# Patient Record
Sex: FEMALE | Race: WHITE | NOT HISPANIC OR LATINO | Employment: OTHER | URBAN - METROPOLITAN AREA
[De-identification: names, ages, dates, MRNs, and addresses within clinical notes are randomized per-mention and may not be internally consistent; named-entity substitution may affect disease eponyms.]

---

## 2017-06-01 ENCOUNTER — ALLSCRIPTS OFFICE VISIT (OUTPATIENT)
Dept: OTHER | Facility: OTHER | Age: 42
End: 2017-06-01

## 2017-06-01 DIAGNOSIS — B34.9 VIRAL INFECTION: ICD-10-CM

## 2017-06-13 ENCOUNTER — ALLSCRIPTS OFFICE VISIT (OUTPATIENT)
Dept: OTHER | Facility: OTHER | Age: 42
End: 2017-06-13

## 2017-10-19 ENCOUNTER — GENERIC CONVERSION - ENCOUNTER (OUTPATIENT)
Dept: OTHER | Facility: OTHER | Age: 42
End: 2017-10-19

## 2018-01-12 VITALS
BODY MASS INDEX: 26.09 KG/M2 | SYSTOLIC BLOOD PRESSURE: 118 MMHG | WEIGHT: 172.13 LBS | HEIGHT: 68 IN | DIASTOLIC BLOOD PRESSURE: 74 MMHG

## 2018-01-13 VITALS
BODY MASS INDEX: 25.61 KG/M2 | RESPIRATION RATE: 18 BRPM | WEIGHT: 169 LBS | DIASTOLIC BLOOD PRESSURE: 60 MMHG | HEART RATE: 117 BPM | HEIGHT: 68 IN | TEMPERATURE: 98.8 F | SYSTOLIC BLOOD PRESSURE: 116 MMHG

## 2018-01-22 VITALS
DIASTOLIC BLOOD PRESSURE: 72 MMHG | SYSTOLIC BLOOD PRESSURE: 120 MMHG | WEIGHT: 167 LBS | HEIGHT: 68 IN | TEMPERATURE: 99.1 F | BODY MASS INDEX: 25.31 KG/M2

## 2019-07-02 ENCOUNTER — APPOINTMENT (OUTPATIENT)
Dept: RADIOLOGY | Age: 44
End: 2019-07-02
Payer: COMMERCIAL

## 2019-07-02 ENCOUNTER — OFFICE VISIT (OUTPATIENT)
Dept: URGENT CARE | Age: 44
End: 2019-07-02
Payer: COMMERCIAL

## 2019-07-02 VITALS
DIASTOLIC BLOOD PRESSURE: 66 MMHG | OXYGEN SATURATION: 96 % | TEMPERATURE: 98.9 F | HEART RATE: 90 BPM | WEIGHT: 184 LBS | RESPIRATION RATE: 18 BRPM | BODY MASS INDEX: 27.89 KG/M2 | HEIGHT: 68 IN | SYSTOLIC BLOOD PRESSURE: 132 MMHG

## 2019-07-02 DIAGNOSIS — B37.9 YEAST INFECTION: ICD-10-CM

## 2019-07-02 DIAGNOSIS — S99.911A INJURY OF RIGHT ANKLE, INITIAL ENCOUNTER: Primary | ICD-10-CM

## 2019-07-02 DIAGNOSIS — S82.891A CLOSED FRACTURE OF RIGHT ANKLE, INITIAL ENCOUNTER: ICD-10-CM

## 2019-07-02 DIAGNOSIS — S99.911A INJURY OF RIGHT ANKLE, INITIAL ENCOUNTER: ICD-10-CM

## 2019-07-02 PROCEDURE — 73630 X-RAY EXAM OF FOOT: CPT

## 2019-07-02 PROCEDURE — 99203 OFFICE O/P NEW LOW 30 MIN: CPT | Performed by: NURSE PRACTITIONER

## 2019-07-02 PROCEDURE — 73610 X-RAY EXAM OF ANKLE: CPT

## 2019-07-02 PROCEDURE — 29515 APPLICATION SHORT LEG SPLINT: CPT | Performed by: NURSE PRACTITIONER

## 2019-07-02 RX ORDER — LEVONORGESTREL AND ETHINYL ESTRADIOL 0.1-0.02MG
KIT ORAL
COMMUNITY
End: 2019-07-02

## 2019-07-02 RX ORDER — LEVONORGESTREL AND ETHINYL ESTRADIOL 0.1-0.02MG
KIT ORAL
COMMUNITY
Start: 2017-11-10 | End: 2019-07-02

## 2019-07-02 RX ORDER — LEVONORGESTREL AND ETHINYL ESTRADIOL 0.1-0.02MG
KIT ORAL EVERY 24 HOURS
COMMUNITY
Start: 2017-10-30 | End: 2019-07-02

## 2019-07-02 RX ORDER — OXYCODONE HYDROCHLORIDE AND ACETAMINOPHEN 5; 325 MG/1; MG/1
1 TABLET ORAL EVERY 8 HOURS PRN
Qty: 9 TABLET | Refills: 0 | Status: SHIPPED | OUTPATIENT
Start: 2019-07-02 | End: 2019-07-05

## 2019-07-02 RX ORDER — RANITIDINE 150 MG/1
CAPSULE ORAL
COMMUNITY
End: 2020-10-12

## 2019-07-02 RX ORDER — LORAZEPAM 1 MG/1
.5-1 TABLET ORAL 2 TIMES DAILY PRN
COMMUNITY
Start: 2017-06-13 | End: 2019-07-02

## 2019-07-02 RX ORDER — FLUTICASONE PROPIONATE 50 MCG
2 SPRAY, SUSPENSION (ML) NASAL 2 TIMES DAILY
COMMUNITY
Start: 2017-10-19 | End: 2019-07-02

## 2019-07-02 RX ORDER — HYDROCODONE BITARTRATE AND ACETAMINOPHEN 5; 325 MG/1; MG/1
TABLET ORAL
COMMUNITY
Start: 2017-04-13 | End: 2019-07-02

## 2019-07-02 RX ORDER — FAMOTIDINE 40 MG/1
TABLET, FILM COATED ORAL
COMMUNITY
End: 2020-10-12

## 2019-07-02 RX ORDER — FLUCONAZOLE 150 MG/1
TABLET ORAL
COMMUNITY
Start: 2017-11-10 | End: 2019-07-02

## 2019-07-02 RX ORDER — METRONIDAZOLE 7.5 MG/G
GEL VAGINAL EVERY 24 HOURS
COMMUNITY
Start: 2017-05-11 | End: 2019-07-02

## 2019-07-02 RX ORDER — FAMOTIDINE 10 MG
10 TABLET ORAL 2 TIMES DAILY
COMMUNITY
End: 2019-07-02

## 2019-07-02 NOTE — PATIENT INSTRUCTIONS
F/u with ortho asap  Use crutches and do not bear weight on right ankle  Elevate right ankle and ice  Keep splint dry  Vulvovaginal Candidiasis   WHAT YOU NEED TO KNOW:   Vulvovaginal candidiasis, or yeast infection, is a common vaginal infection  Vulvovaginal candidiasis is caused by a fungus, or yeast-like germ  Fungi are normally found in your vagina  When there are too many fungi, it can cause an infection  DISCHARGE INSTRUCTIONS:   Return to the emergency department if:   · You have fever and chills  · You are bleeding from your vagina and it is not your monthly period  · You develop abdominal or pelvic pain  Contact your healthcare provider if:   · Your signs and symptoms get worse, even after treatment  · You have questions or concerns about your condition or care  Medicines:   · Medicines  help treat the fungal infection and decrease inflammation  The medicine may be a pill, topical cream, or vaginal suppository  · Take your medicine as directed  Contact your healthcare provider if you think your medicine is not helping or if you have side effects  Tell him of her if you are allergic to any medicine  Keep a list of the medicines, vitamins, and herbs you take  Include the amounts, and when and why you take them  Bring the list or the pill bottles to follow-up visits  Carry your medicine list with you in case of an emergency  Manage your symptoms:   · Wear cotton underwear  · Keep the vaginal area clean and dry  · Do not have sex until your symptoms are gone  · Do not douche  · Do not use feminine hygiene sprays, powders, or bubble bath  Prevent another infection:   · Take showers instead of baths  · Eat yogurt  · Limit the amount of alcohol you drink'    · Control your blood sugar if you are diabetic  · Limit your time in hot tubs  Follow up with your healthcare provider as directed:  Write down your questions so you remember to ask them during your visits     © 2017 [facility] Information is for End User's use only and may not be sold, redistributed or otherwise used for commercial purposes  All illustrations and images included in CareNotes® are the copyrighted property of [employer]  or Ollie Argueta  The above information is an  only  It is not intended as medical advice for individual conditions or treatments  Talk to your doctor, nurse or pharmacist before following any medical regimen to see if it is safe and effective for you  Ankle Fracture   WHAT YOU NEED TO KNOW:   An ankle fracture is a break in 1 or more of the bones in your ankle  DISCHARGE INSTRUCTIONS:   Call 911 for any of the following:   · You feel lightheaded, short of breath, and have chest pain  · You cough up blood  Return to the emergency department if:   · Your leg feels warm, tender, and painful  It may look swollen and red  · Blood soaks through your bandage  · You have severe pain in your ankle  · Your cast feels too tight  · Your foot or toes are cold or numb  · Your foot or toenails turn blue or gray  Contact your healthcare provider if:   · Your splint feels too tight  · Your swelling has increased or returned  · You have a fever  · Your pain does not go away, even after treatment  · You have questions or concerns about your condition or care  Medicines: You may need any of the following:  · Acetaminophen  decreases pain and fever  It is available without a doctor's order  Ask how much to take and how often to take it  Follow directions  Acetaminophen can cause liver damage if not taken correctly  · NSAIDs , such as ibuprofen, help decrease swelling, pain, and fever  This medicine is available with or without a doctor's order  NSAIDs can cause stomach bleeding or kidney problems in certain people  If you take blood thinner medicine, always ask your healthcare provider if NSAIDs are safe for you   Always read the medicine label and follow directions  · Prescription pain medicine  may be given  Ask your healthcare provider how to take this medicine safely  · Take your medicine as directed  Contact your healthcare provider if you think your medicine is not helping or if you have side effects  Tell him or her if you are allergic to any medicine  Keep a list of the medicines, vitamins, and herbs you take  Include the amounts, and when and why you take them  Bring the list or the pill bottles to follow-up visits  Carry your medicine list with you in case of an emergency  Follow up with your healthcare provider in 1 to 2 days: Your fracture may need to be reduced (bones pushed back into place) or you may need surgery  Write down your questions so you remember to ask them during your visits  Support devices: You will be given a brace, cast, or splint to limit your movement and protect your ankle  You may need to use crutches to protect your ankle and decrease your pain as you move around  Do not remove your device and do not put weight on your injured ankle  Splint and cast care:  Cover the splint or cast before you bathe so it does not get wet  Tape 2 plastic trash bags to your skin above the cast  Try to keep your ankle out of the water as much as possible  Rest:  Rest your ankle so that it can heal  Return to normal activities as directed  Ice:  Apply ice on your ankle for 15 to 20 minutes every hour or as directed  Use an ice pack, or put crushed ice in a plastic bag  Cover it with a towel  Ice helps prevent tissue damage and decreases swelling and pain  Elevate:  Elevate your ankle above the level of your heart as often as you can  This will help decrease swelling and pain  Prop your ankle on pillows or blankets to keep it elevated comfortably  © 2017 [employer] Information is for End User's use only and may not be sold, redistributed or otherwise used for commercial purposes   All illustrations and images included in CareNotes® are the copyrighted property of A [employer] M [employer]  or Ollie Argueta  The above information is an  only  It is not intended as medical advice for individual conditions or treatments  Talk to your doctor, nurse or pharmacist before following any medical regimen to see if it is safe and effective for you

## 2019-07-02 NOTE — PROGRESS NOTES
[address] [facility] [facility]        NAME: Johnathan Montoya is a 37 y o  female  : 1975    MRN: [id number]  DATE: 2019  TIME: 3:07 PM    Assessment and Plan   Injury of right ankle, initial encounter [S99 911A]  1  Injury of right ankle, initial encounter  XR foot 3+ vw right    XR ankle 3+ vw right    Ambulatory referral to Orthopedic Surgery    oxyCODONE-acetaminophen (PERCOCET) 5-325 mg per tablet   2  Closed fracture of right ankle, initial encounter     3  Yeast infection         Orthopedic injury treatment  Date/Time: 2019 3:05 PM  Performed by: ABBY Taylor  Authorized by: ABBY Taylor     Patient Location:  Clinic  Verbal consent obtained?: Yes    Risks and benefits: Risks, benefits and alternatives were discussed    Consent given by:  Patient  Patient states understanding of procedure being performed: Yes    Radiology Images displayed and confirmed  If images not available, report reviewed: Yes    Patient identity confirmed:  Verbally with patient  Injury location:  Ankle  Location details:  Right ankle  Injury type:  Fracture  Fracture type: lateral malleolus    Fracture type: lateral malleolus    Neurovascular status: Neurovascularly intact    Distal perfusion: normal    Neurological function: normal    Range of motion: normal    Manipulation performed?: No    Immobilization:  Splint and crutches  Splint type:  Short leg  Neurovascular status: Neurovascularly intact    Distal perfusion: normal    Neurological function: normal    Range of motion: normal    Patient tolerance:  Patient tolerated the procedure well with no immediate complications      Patient Instructions   F/u with ortho asap  Use crutches and do not bear weight on right ankle  Elevate right ankle and ice  Keep splint dry  Return precautions discussed  Take fluconazole as directed for yeast infection  Follow up with GYN  Follow up with PCP in 3-5 days  Proceed to  ER if symptoms worsen      Chief Complaint Chief Complaint   Patient presents with    Ankle Injury     Pt reports injuring her rigtht foot/ankle while she was walking on Friday evening  PT c/o aching, sharp, throbbing pain on the lateral side of her right foot and ankle  Pt has been icing it and taking ibuprofen for symptoms  Pt also c/o vaginal itchiness, white discharge, and foul-smelling vaginal odor since Monday  Pt used Monistat cream for symptoms  History of Present Illness       Patient is a 37year old female who presents with right ankle and foot pain x 4 days and a yeast infection x 3 days  Patient states that she was camping and stepped into a hole and twisted her right ankle  Had been using an ace wrap, ice, and motrin with mild relief  Denies decreased sensation or numbness/tingling in right foot or leg  Denies additional injuries related to fall  Denies head injury, neck or back pain  -thinners  Patient is also c/o a 3 day history of white, foul smelling, vaginal discharge and vaginal itching x 3 days  Used OTC monistat without relief  Denies pelvic pain, vaginal bleeding, abd pain, fever, chills, or STD exposure  Review of Systems   Review of Systems   Constitutional: Negative for chills, diaphoresis, fatigue and fever  Respiratory: Negative for cough and shortness of breath  Cardiovascular: Negative for chest pain, palpitations and leg swelling  Gastrointestinal: Negative for abdominal pain, diarrhea, nausea and vomiting  Genitourinary: Positive for vaginal discharge  Negative for decreased urine volume, difficulty urinating, dysuria, flank pain, frequency, hematuria, pelvic pain, urgency, vaginal bleeding and vaginal pain  Musculoskeletal: Positive for joint swelling  Negative for arthralgias, back pain, neck pain and neck stiffness  Neurological: Negative for dizziness and headaches           Current Medications       Current Outpatient Medications:     famotidine (PEPCID) 40 MG tablet, Take by mouth, Disp: , Rfl:     ranitidine (ZANTAC) 150 MG capsule, Take by mouth, Disp: , Rfl:     diphenhydrAMINE (BENADRYL) 50 MG tablet, Take 50 mg by mouth daily as needed, Disp: , Rfl:     doxylamine (UNISOM) 25 MG tablet, Take by mouth, Disp: , Rfl:     oxyCODONE-acetaminophen (PERCOCET) 5-325 mg per tablet, Take 1 tablet by mouth every 8 (eight) hours as needed for moderate pain for up to 3 daysMax Daily Amount: 3 tablets, Disp: 9 tablet, Rfl: 0    Current Allergies     Allergies as of 07/02/2019 - Reviewed 07/02/2019   Allergen Reaction Noted    Bee venom  03/25/2017    Cat hair extract  03/25/2017    Diclofenac  06/06/2017    Dog epithelium  06/01/2017    Dog epithelium allergy skin test  03/25/2017    Dust mite extract  06/01/2017    Grass extracts  [gramineae pollens]  06/01/2017    Other  06/01/2017    Pollen extract  06/01/2017    Root beer flavor  03/25/2017            The following portions of the patient's history were reviewed and updated as appropriate: allergies, current medications, past family history, past medical history, past social history, past surgical history and problem list      Past Medical History:   Diagnosis Date    Gallbladder disorder     Menorrhagia     Severe dysmenorrhea        Past Surgical History:   Procedure Laterality Date    CERVICAL BIOPSY  W/ LOOP ELECTRODE EXCISION  2006    WISDOM TOOTH EXTRACTION         History reviewed  No pertinent family history  Medications have been verified  Objective   /66   Pulse 90   Temp 98 9 °F (37 2 °C)   Resp 18   Ht 5' 8" (1 727 m)   Wt 83 5 kg (184 lb)   SpO2 96%   BMI 27 98 kg/m²        Physical Exam     Physical Exam   Constitutional: She appears well-developed and well-nourished  HENT:   Head: Normocephalic and atraumatic  Neck: Normal range of motion  Neck supple  Cardiovascular: Regular rhythm and normal heart sounds     Pulmonary/Chest: Effort normal and breath sounds normal    Abdominal: Normal appearance and bowel sounds are normal  There is no tenderness  Musculoskeletal:        Right ankle: She exhibits swelling and ecchymosis  She exhibits normal range of motion, no deformity, no laceration and normal pulse  Tenderness  Lateral malleolus tenderness found  No medial malleolus, no AITFL, no CF ligament, no posterior TFL, no head of 5th metatarsal and no proximal fibula tenderness found

## 2019-07-02 NOTE — PROGRESS NOTES
Pt has fracture of R fibula  Chuyita Melendez does not have TUCKER  We reviewed patient information and available imaging together  I will rx 3 day course of percocet TID  PDMP was checked and pt does not have active pain medication Rx at this time

## 2019-07-05 ENCOUNTER — OFFICE VISIT (OUTPATIENT)
Dept: OBGYN CLINIC | Facility: CLINIC | Age: 44
End: 2019-07-05
Payer: COMMERCIAL

## 2019-07-05 VITALS
BODY MASS INDEX: 27.98 KG/M2 | HEIGHT: 68 IN | HEART RATE: 96 BPM | SYSTOLIC BLOOD PRESSURE: 117 MMHG | DIASTOLIC BLOOD PRESSURE: 78 MMHG

## 2019-07-05 DIAGNOSIS — S99.911A INJURY OF RIGHT ANKLE, INITIAL ENCOUNTER: ICD-10-CM

## 2019-07-05 DIAGNOSIS — S82.62XA CLOSED AVULSION FRACTURE OF LATERAL MALLEOLUS OF LEFT FIBULA, INITIAL ENCOUNTER: Primary | ICD-10-CM

## 2019-07-05 PROCEDURE — 99203 OFFICE O/P NEW LOW 30 MIN: CPT | Performed by: ORTHOPAEDIC SURGERY

## 2019-07-05 RX ORDER — ASPIRIN 325 MG
325 TABLET, DELAYED RELEASE (ENTERIC COATED) ORAL EVERY 12 HOURS
Qty: 84 TABLET | Refills: 0 | Status: SHIPPED | OUTPATIENT
Start: 2019-07-05 | End: 2020-10-12

## 2019-07-05 NOTE — PROGRESS NOTES
ADELIA Shoemaker  Attending, Orthopaedic Surgery  [facility] [address] [facility]      ORTHOPAEDIC FOOT AND ANKLE CLINIC VISIT     Assessment:     Encounter Diagnoses   Name Primary?  Injury of right ankle, initial encounter     Closed avulsion fracture of lateral malleolus of left fibula, initial encounter Yes            Plan:   · The patient verbalized understanding of exam findings and treatment plan  We engaged in the shared decision-making process and treatment options were discussed at length with the patient  Surgical and conservative management discussed today along with risks and benefits  · WBAT in CAM boot 6 weeks  · ASA 325mg BID for DVT ppx  · Vit D 4000u, Ca 1200mg  · PT to start immediately  · She is not safe to drive while in the boot  · See back in 6 weeks with Xrays        History of Present Illness:   Chief Complaint:   Chief Complaint   Patient presents with    Right Ankle - Pain     Eddi Tadeo is a 37 y o  female who is being seen for left distal fibula fracture  She was camping and rolled inward  Pain is localized at outside over the fracture site with minimal radiating and described as sharp and severe  Patient denies numbness, tingling or radicular pain  Denies history of neuropathy  Patient does not smoke, does not have diabetes and does not take blood thinners  Patient denies family history of anesthesia complications and has not had any complications with anesthesia       Pain/symptom timing:  Worse during the day when active  Pain/symptom context:  Worse with activites and work  Pain/symptom modifying factors:  Rest makes better, activities make worse  Pain/symptom associated signs/symptoms: none    Prior treatment   · NSAIDsYes   · Injections No   · Bracing/Orthotics Yes    · Physical Therapy No     Orthopedic Surgical History:   None    Past Medical, Surgical and Social History:  Past Medical History:  has a past medical history of Gallbladder disorder, Menorrhagia, and Severe dysmenorrhea  Problem List: does not have a problem list on file  Past Surgical History:  has a past surgical history that includes Cervical biopsy w/ loop electrode excision (2006) and [place] tooth extraction  Family History: family history is not on file  Social History:  reports that she has been smoking  She has never used smokeless tobacco  Her alcohol and drug histories are not on file  Current Medications: has a current medication list which includes the following prescription(s): diphenhydramine, doxylamine, famotidine, oxycodone-acetaminophen, ranitidine, and aspirin  Allergies: is allergic to bee pollen; bee venom; cat hair extract; diclofenac; dog epithelium; dog epithelium allergy skin test; dust mite extract; grass extracts  [gramineae pollens]; other; pollen extract; and root beer flavor  Review of Systems:  General- denies fever/chills  HEENT- denies hearing loss or sore throat  Eyes- denies eye pain or visual disturbances, denies red eyes  Respiratory- denies cough or SOB  Cardio- denies chest pain or palpitations  GI- denies abdominal pain  Endocrine- denies urinary frequency  Urinary- denies pain with urination  Musculoskeletal- Negative except noted above  Skin- denies rashes or wounds  Neurological- denies dizziness or headache  Psychiatric- denies anxiety or difficulty concentrating    Physical Exam:   /78 (BP Location: Left arm, Patient Position: Sitting, Cuff Size: Adult)   Pulse 96   Ht 5' 8" (1 727 m)   BMI 27 98 kg/m²   General/Constitutional: No apparent distress: well-nourished and well developed    Eyes: normal ocular motion  Lymphatic: No appreciable lymphadenopathy  Respiratory: Non-labored breathing  Vascular: No edema, swelling or tenderness, except as noted in detailed exam   Integumentary: No impressive skin lesions present, except as noted in detailed exam   Neuro: No ataxia or tremors noted  Psych: Normal mood and affect, oriented to person, place and time  Appropriate affect  Musculoskeletal: Normal, except as noted in detailed exam and in HPI  Examination    Left    Gait Antalgic   Musculoskeletal Tender to palpation at fracture site    Skin Normal   +Swelling    Nails Normal    Range of Motion  10 degrees dorsiflexion, 40 degrees plantarflexion  Subtalar motion: normal    Stability Stable    Muscle Strength 5/5 tibialis anterior  5/5 gastrocnemius-soleus  5/5 posterior tibialis  5/5 peroneal/eversion strength  5/5 EHL  5/5 FHL    Neurologic Normal    Sensation Intact to light touch throughout sural, saphenous, superficial peroneal, deep peroneal and medial/lateral plantar nerve distributions  [place] 5 07 filament (10g) testing deferred  Cardiovascular Brisk capillary refill < 2 seconds,intact DP and PT pulses    Special Tests No pain along peroneals      Imaging Studies:   3 views of the right ankle were taken, reviewed and interpreted independently that demonstrate distal fibula avulsion fracture which is minimally displaced  Reviewed by me personally  Timmy Spates Lachman, MD  Foot & Ankle Surgery   Department of [address] [place] [address]      I personally performed the service  Timmy Spates Lachman, MD

## 2019-07-05 NOTE — PATIENT INSTRUCTIONS
· WBAT in CAM boot 4 weeks, if comfortable at 4 weeks, may begin to wean boot as instructed below over the 5th week  · ASA 325mg BID for DVT ppx  · Vit D 4000u, Ca 1200mg  · PT to start immediately  · She is not safe to drive while in the boot  · See back in 6 weeks  You may begin weaning your boot 4 weeks from now and transitioning to a sneaker  It is important to do this gradually to avoid aggravating the healing process  1   4 weeks from now, you may come out of the boot into a sneaker for 2 hours  2  The next day, you may come out of the boot into a sneaker for 4 hours,  3  The next day, you may come out of the boot into a sneaker for 6 hours  4  Continue this (adding 2 hours per day) as you tolerate  For example, if you do 6 hours out of the boot into a sneaker and your foot swells more than usual at night and it is difficult to control the discomfort, do not advance to 8 hours the next day, stay at 6 hours until you are able to tolerate it  Elevation, Ice and tylenol and staying off of it at night will be important to aide in this transition out of the boot  Swelling and soreness are normal as you begin to do more with the injured leg

## 2019-07-17 ENCOUNTER — TELEPHONE (OUTPATIENT)
Dept: OBGYN CLINIC | Facility: HOSPITAL | Age: 44
End: 2019-07-17

## 2019-07-17 NOTE — TELEPHONE ENCOUNTER
Please be advise I spoke with pt  Who stated she is experiencing an increase in pain to right ankle that is waking her up at night  Pt  Stated it feels like "my foot is on fire " pt  Has taken extra strength tylenol 1000 mg bid, but is not getting any relief  Pt  Has also stated she is experiencing the boot rubbing and causing redness to top of foot near the ankle and numbness and tingling to toes    Pt  Is making adjustment to boot as needed to accommodate for swelling but feels like symptoms are getting worst  I in instructed pt  On signs and symptoms of DVT, how to check capillary refill and elevation of extremity  Pt  Has appt  For follow up in august and feels that she may need to come in sooner  I offered pt  Appt  For tomorrow to be checked and pt  Accepted   At this time pt  Deny any redness, swelling, warmth  and tenderness to calf area  Pt  Is aware of date time and place of appt

## 2019-07-18 ENCOUNTER — OFFICE VISIT (OUTPATIENT)
Dept: OBGYN CLINIC | Facility: CLINIC | Age: 44
End: 2019-07-18

## 2019-07-18 VITALS
SYSTOLIC BLOOD PRESSURE: 120 MMHG | HEART RATE: 101 BPM | DIASTOLIC BLOOD PRESSURE: 64 MMHG | HEIGHT: 68 IN | BODY MASS INDEX: 27.98 KG/M2

## 2019-07-18 DIAGNOSIS — S82.62XA CLOSED AVULSION FRACTURE OF LATERAL MALLEOLUS OF LEFT FIBULA, INITIAL ENCOUNTER: Primary | ICD-10-CM

## 2019-07-18 PROCEDURE — 99213 OFFICE O/P EST LOW 20 MIN: CPT | Performed by: ORTHOPAEDIC SURGERY

## 2019-07-18 NOTE — TELEPHONE ENCOUNTER
Her injury is not serious  As far as her chart is concerned, she has not started PT which was to start immediately based on her note from 7/7/19  We will review my instructions from her previous visit but unfortunately, time is the only treatment for this injury

## 2019-07-18 NOTE — PROGRESS NOTES
ADELIA Swartz  Attending, Orthopaedic Surgery  Foot and [address] [facility] [address] [facility]      ORTHOPAEDIC FOOT AND ANKLE CLINIC VISIT     Assessment:     Encounter Diagnosis   Name Primary?  Closed avulsion fracture of lateral malleolus of left fibula, initial encounter Yes            Plan:   · The patient verbalized understanding of exam findings and treatment plan  We engaged in the shared decision-making process and treatment options were discussed at length with the patient  Surgical and conservative management discussed today along with risks and benefits  · Advised to start PT as soon as possible for anti-inflammatory modalities   · Encouraged ice and elevation for swelling control  The burning sensation she is experiencing is likely due to swelling  · NSAIDs for pain control  Return in about 4 weeks (around 8/13/2019)  History of Present Illness:   Chief Complaint:   Chief Complaint   Patient presents with    Left Ankle - Follow-up     Eugenia Wagoner is a 37 y o  female who is being seen in follow-up for Right distal fibula avulsion fracture  When we last saw she we recommended PT and CAM boot  Pain has  Improved but she is having burning issues in the toes  Residual pain is localized at lateral malleolus with minimal radiating and described as sharp and severe  Pain/symptom timing:  Worse during the day when active  Pain/symptom context:  Worse with activites and work  Pain/symptom modifying factors:  Rest makes better, activities make worse  Pain/symptom associated signs/symptoms: none    Prior treatment   · NSAIDsNo   · Injections No   · Bracing/Orthotics Yes    · Physical Therapy No     Orthopedic Surgical History:   None    Past Medical, Surgical and Social History:  Past Medical History:  has a past medical history of Gallbladder disorder, Menorrhagia, and Severe dysmenorrhea  Problem List: does not have a problem list on file    Past Surgical History:  has a past surgical history that includes Cervical biopsy w/ loop electrode excision (2006) and [place] tooth extraction  Family History: family history is not on file  Social History:  reports that she has been smoking  She has never used smokeless tobacco  Her alcohol and drug histories are not on file  Current Medications: has a current medication list which includes the following prescription(s): aspirin, diphenhydramine, doxylamine, famotidine, and ranitidine  Allergies: is allergic to bee pollen; bee venom; cat hair extract; diclofenac; dog epithelium; dog epithelium allergy skin test; dust mite extract; grass extracts  [gramineae pollens]; other; pollen extract; and root beer flavor  Review of Systems:  General- denies fever/chills  HEENT- denies hearing loss or sore throat  Eyes- denies eye pain or visual disturbances, denies red eyes  Respiratory- denies cough or SOB  Cardio- denies chest pain or palpitations  GI- denies abdominal pain  Endocrine- denies urinary frequency  Urinary- denies pain with urination  Musculoskeletal- Negative except noted above  Skin- denies rashes or wounds  Neurological- denies dizziness or headache  Psychiatric- denies anxiety or difficulty concentrating    Physical Exam:   /64 (BP Location: Right arm, Patient Position: Sitting, Cuff Size: Adult)   Pulse 101   Ht 5' 8" (1 727 m)   BMI 27 98 kg/m²   General/Constitutional: No apparent distress: well-nourished and well developed  Eyes: normal ocular motion  Lymphatic: No appreciable lymphadenopathy  Respiratory: Non-labored breathing  Vascular: No edema, swelling or tenderness, except as noted in detailed exam   Integumentary: No impressive skin lesions present, except as noted in detailed exam   Neuro: No ataxia or tremors noted  Psych: Normal mood and affect, oriented to person, place and time  Appropriate affect  Musculoskeletal: Normal, except as noted in detailed exam and in HPI      Examination Right    Gait Antalgic   Musculoskeletal Tender to palpation at fracture site    Skin Normal     Nails Normal    Range of Motion  20 degrees dorsiflexion, 40 degrees plantarflexion  Subtalar motion: normal    Stability Stable    Muscle Strength 5/5 tibialis anterior  5/5 gastrocnemius-soleus  5/5 posterior tibialis  5/5 peroneal/eversion strength  5/5 EHL  5/5 FHL    Neurologic Normal    Sensation Intact to light touch throughout sural, saphenous, superficial peroneal, deep peroneal and medial/lateral plantar nerve distributions  [place] 5 07 filament (10g) testing deferred  Cardiovascular Brisk capillary refill < 2 seconds,intact DP and PT pulses    Special Tests None      Imaging Studies:   No new imaging        James R Lachman, MD  Foot & Ankle Surgery   Department of [address] [place] [address]      I personally performed the service  Melda Fulling Lachman, MD

## 2020-06-26 ENCOUNTER — TELEPHONE (OUTPATIENT)
Dept: FAMILY MEDICINE CLINIC | Facility: CLINIC | Age: 45
End: 2020-06-26

## 2020-10-12 ENCOUNTER — OFFICE VISIT (OUTPATIENT)
Dept: FAMILY MEDICINE CLINIC | Facility: CLINIC | Age: 45
End: 2020-10-12
Payer: COMMERCIAL

## 2020-10-12 VITALS
DIASTOLIC BLOOD PRESSURE: 68 MMHG | WEIGHT: 173.2 LBS | SYSTOLIC BLOOD PRESSURE: 106 MMHG | BODY MASS INDEX: 26.25 KG/M2 | TEMPERATURE: 96.9 F | HEIGHT: 68 IN

## 2020-10-12 DIAGNOSIS — Z12.31 SCREENING MAMMOGRAM, ENCOUNTER FOR: ICD-10-CM

## 2020-10-12 DIAGNOSIS — Z23 ENCOUNTER FOR IMMUNIZATION: ICD-10-CM

## 2020-10-12 DIAGNOSIS — K21.9 GASTROESOPHAGEAL REFLUX DISEASE WITHOUT ESOPHAGITIS: ICD-10-CM

## 2020-10-12 DIAGNOSIS — Z63.4 BEREAVEMENT: Primary | ICD-10-CM

## 2020-10-12 DIAGNOSIS — Z72.0 TOBACCO ABUSE: ICD-10-CM

## 2020-10-12 PROCEDURE — 3725F SCREEN DEPRESSION PERFORMED: CPT | Performed by: FAMILY MEDICINE

## 2020-10-12 PROCEDURE — 99203 OFFICE O/P NEW LOW 30 MIN: CPT | Performed by: FAMILY MEDICINE

## 2020-10-12 RX ORDER — LORAZEPAM 1 MG/1
1 TABLET ORAL 2 TIMES DAILY PRN
Qty: 20 TABLET | Refills: 0 | Status: SHIPPED | OUTPATIENT
Start: 2020-10-12 | End: 2020-11-05 | Stop reason: SDUPTHER

## 2020-10-12 RX ORDER — OMEPRAZOLE 40 MG/1
40 CAPSULE, DELAYED RELEASE ORAL 2 TIMES DAILY
COMMUNITY

## 2020-10-12 SDOH — SOCIAL STABILITY - SOCIAL INSECURITY: DISSAPEARANCE AND DEATH OF FAMILY MEMBER: Z63.4

## 2020-11-05 DIAGNOSIS — Z63.4 BEREAVEMENT: ICD-10-CM

## 2020-11-05 RX ORDER — LORAZEPAM 1 MG/1
1 TABLET ORAL 2 TIMES DAILY PRN
Qty: 20 TABLET | Refills: 0 | Status: SHIPPED | OUTPATIENT
Start: 2020-11-05 | End: 2020-11-12

## 2020-11-05 SDOH — SOCIAL STABILITY - SOCIAL INSECURITY: DISSAPEARANCE AND DEATH OF FAMILY MEMBER: Z63.4

## 2020-11-11 DIAGNOSIS — Z63.4 BEREAVEMENT: ICD-10-CM

## 2020-11-11 SDOH — SOCIAL STABILITY - SOCIAL INSECURITY: DISSAPEARANCE AND DEATH OF FAMILY MEMBER: Z63.4

## 2020-11-12 RX ORDER — LORAZEPAM 1 MG/1
1 TABLET ORAL 2 TIMES DAILY PRN
Qty: 20 TABLET | Refills: 0 | Status: SHIPPED | OUTPATIENT
Start: 2020-11-12 | End: 2020-11-20

## 2020-11-20 ENCOUNTER — OFFICE VISIT (OUTPATIENT)
Dept: FAMILY MEDICINE CLINIC | Facility: CLINIC | Age: 45
End: 2020-11-20
Payer: COMMERCIAL

## 2020-11-20 VITALS
BODY MASS INDEX: 26.37 KG/M2 | HEIGHT: 68 IN | DIASTOLIC BLOOD PRESSURE: 68 MMHG | WEIGHT: 174 LBS | TEMPERATURE: 97.6 F | SYSTOLIC BLOOD PRESSURE: 100 MMHG

## 2020-11-20 DIAGNOSIS — F32.1 CURRENT MODERATE EPISODE OF MAJOR DEPRESSIVE DISORDER WITHOUT PRIOR EPISODE (HCC): ICD-10-CM

## 2020-11-20 DIAGNOSIS — Z63.4 BEREAVEMENT: Primary | ICD-10-CM

## 2020-11-20 DIAGNOSIS — Z72.0 TOBACCO ABUSE: ICD-10-CM

## 2020-11-20 DIAGNOSIS — F41.0 PANIC ATTACK: ICD-10-CM

## 2020-11-20 PROCEDURE — 99214 OFFICE O/P EST MOD 30 MIN: CPT | Performed by: FAMILY MEDICINE

## 2020-11-20 PROCEDURE — 4004F PT TOBACCO SCREEN RCVD TLK: CPT | Performed by: FAMILY MEDICINE

## 2020-11-20 PROCEDURE — 3008F BODY MASS INDEX DOCD: CPT | Performed by: FAMILY MEDICINE

## 2020-11-20 RX ORDER — ALPRAZOLAM 0.5 MG/1
0.5 TABLET ORAL 2 TIMES DAILY PRN
Qty: 30 TABLET | Refills: 0 | Status: SHIPPED | OUTPATIENT
Start: 2020-11-20 | End: 2020-12-09 | Stop reason: SDUPTHER

## 2020-11-20 RX ORDER — DULOXETIN HYDROCHLORIDE 30 MG/1
30 CAPSULE, DELAYED RELEASE ORAL DAILY
Qty: 30 CAPSULE | Refills: 5 | Status: SHIPPED | OUTPATIENT
Start: 2020-11-20 | End: 2020-12-21 | Stop reason: SDUPTHER

## 2020-11-20 RX ORDER — ESZOPICLONE 2 MG/1
2 TABLET, FILM COATED ORAL
Qty: 30 TABLET | Refills: 1 | Status: SHIPPED | OUTPATIENT
Start: 2020-11-20 | End: 2020-12-21

## 2020-11-20 SDOH — SOCIAL STABILITY - SOCIAL INSECURITY: DISSAPEARANCE AND DEATH OF FAMILY MEMBER: Z63.4

## 2020-12-09 DIAGNOSIS — F41.0 PANIC ATTACK: ICD-10-CM

## 2020-12-09 RX ORDER — ALPRAZOLAM 0.5 MG/1
0.5 TABLET ORAL 2 TIMES DAILY PRN
Qty: 30 TABLET | Refills: 0 | Status: SHIPPED | OUTPATIENT
Start: 2020-12-09 | End: 2020-12-21 | Stop reason: SDUPTHER

## 2020-12-21 ENCOUNTER — OFFICE VISIT (OUTPATIENT)
Dept: FAMILY MEDICINE CLINIC | Facility: CLINIC | Age: 45
End: 2020-12-21
Payer: COMMERCIAL

## 2020-12-21 VITALS
DIASTOLIC BLOOD PRESSURE: 72 MMHG | HEIGHT: 68 IN | SYSTOLIC BLOOD PRESSURE: 134 MMHG | TEMPERATURE: 97.9 F | WEIGHT: 172 LBS | BODY MASS INDEX: 26.07 KG/M2

## 2020-12-21 DIAGNOSIS — F51.01 PRIMARY INSOMNIA: Primary | ICD-10-CM

## 2020-12-21 DIAGNOSIS — F41.0 PANIC ATTACK: ICD-10-CM

## 2020-12-21 DIAGNOSIS — F32.1 CURRENT MODERATE EPISODE OF MAJOR DEPRESSIVE DISORDER WITHOUT PRIOR EPISODE (HCC): ICD-10-CM

## 2020-12-21 DIAGNOSIS — Z63.4 BEREAVEMENT: ICD-10-CM

## 2020-12-21 PROCEDURE — 3725F SCREEN DEPRESSION PERFORMED: CPT | Performed by: FAMILY MEDICINE

## 2020-12-21 PROCEDURE — 3008F BODY MASS INDEX DOCD: CPT | Performed by: FAMILY MEDICINE

## 2020-12-21 PROCEDURE — 4004F PT TOBACCO SCREEN RCVD TLK: CPT | Performed by: FAMILY MEDICINE

## 2020-12-21 PROCEDURE — 99214 OFFICE O/P EST MOD 30 MIN: CPT | Performed by: FAMILY MEDICINE

## 2020-12-21 RX ORDER — ALPRAZOLAM 0.5 MG/1
0.5 TABLET ORAL 2 TIMES DAILY PRN
Qty: 30 TABLET | Refills: 0 | Status: SHIPPED | OUTPATIENT
Start: 2020-12-21 | End: 2021-01-05 | Stop reason: SDUPTHER

## 2020-12-21 RX ORDER — ZOLPIDEM TARTRATE 10 MG/1
10 TABLET ORAL
Qty: 30 TABLET | Refills: 0 | Status: SHIPPED | OUTPATIENT
Start: 2020-12-21 | End: 2021-01-19 | Stop reason: SDUPTHER

## 2020-12-21 RX ORDER — DULOXETIN HYDROCHLORIDE 60 MG/1
60 CAPSULE, DELAYED RELEASE ORAL DAILY
Qty: 30 CAPSULE | Refills: 1 | Status: SHIPPED | OUTPATIENT
Start: 2020-12-21 | End: 2021-02-18 | Stop reason: SDUPTHER

## 2020-12-21 SDOH — SOCIAL STABILITY - SOCIAL INSECURITY: DISSAPEARANCE AND DEATH OF FAMILY MEMBER: Z63.4

## 2021-01-04 DIAGNOSIS — F41.0 PANIC ATTACK: ICD-10-CM

## 2021-01-04 RX ORDER — ALPRAZOLAM 0.5 MG/1
0.5 TABLET ORAL 2 TIMES DAILY PRN
Qty: 30 TABLET | Refills: 0 | Status: CANCELLED | OUTPATIENT
Start: 2021-01-04

## 2021-01-05 DIAGNOSIS — F41.0 PANIC ATTACK: ICD-10-CM

## 2021-01-05 RX ORDER — ALPRAZOLAM 0.5 MG/1
0.5 TABLET ORAL 2 TIMES DAILY PRN
Qty: 30 TABLET | Refills: 0 | Status: SHIPPED | OUTPATIENT
Start: 2021-01-05 | End: 2021-01-19 | Stop reason: SDUPTHER

## 2021-01-19 DIAGNOSIS — F51.01 PRIMARY INSOMNIA: ICD-10-CM

## 2021-01-19 DIAGNOSIS — F41.0 PANIC ATTACK: ICD-10-CM

## 2021-01-19 RX ORDER — ZOLPIDEM TARTRATE 10 MG/1
10 TABLET ORAL
Qty: 30 TABLET | Refills: 0 | Status: SHIPPED | OUTPATIENT
Start: 2021-01-19 | End: 2021-02-18 | Stop reason: SDUPTHER

## 2021-01-19 RX ORDER — ALPRAZOLAM 0.5 MG/1
0.5 TABLET ORAL 2 TIMES DAILY PRN
Qty: 30 TABLET | Refills: 0 | Status: SHIPPED | OUTPATIENT
Start: 2021-01-19 | End: 2021-02-03 | Stop reason: SDUPTHER

## 2021-02-03 DIAGNOSIS — F41.0 PANIC ATTACK: ICD-10-CM

## 2021-02-04 RX ORDER — ALPRAZOLAM 0.5 MG/1
0.5 TABLET ORAL 2 TIMES DAILY PRN
Qty: 30 TABLET | Refills: 0 | Status: SHIPPED | OUTPATIENT
Start: 2021-02-04 | End: 2021-02-18 | Stop reason: SDUPTHER

## 2021-02-18 DIAGNOSIS — F51.01 PRIMARY INSOMNIA: ICD-10-CM

## 2021-02-18 DIAGNOSIS — Z63.4 BEREAVEMENT: ICD-10-CM

## 2021-02-18 DIAGNOSIS — F41.0 PANIC ATTACK: ICD-10-CM

## 2021-02-18 DIAGNOSIS — F32.1 CURRENT MODERATE EPISODE OF MAJOR DEPRESSIVE DISORDER WITHOUT PRIOR EPISODE (HCC): ICD-10-CM

## 2021-02-18 SDOH — SOCIAL STABILITY - SOCIAL INSECURITY: DISSAPEARANCE AND DEATH OF FAMILY MEMBER: Z63.4

## 2021-02-19 RX ORDER — ALPRAZOLAM 0.5 MG/1
0.5 TABLET ORAL 2 TIMES DAILY PRN
Qty: 30 TABLET | Refills: 0 | Status: SHIPPED | OUTPATIENT
Start: 2021-02-19 | End: 2021-03-10 | Stop reason: SDUPTHER

## 2021-02-19 RX ORDER — DULOXETIN HYDROCHLORIDE 60 MG/1
60 CAPSULE, DELAYED RELEASE ORAL DAILY
Qty: 30 CAPSULE | Refills: 0 | Status: SHIPPED | OUTPATIENT
Start: 2021-02-19 | End: 2021-05-06

## 2021-02-19 RX ORDER — ZOLPIDEM TARTRATE 10 MG/1
10 TABLET ORAL
Qty: 30 TABLET | Refills: 0 | Status: SHIPPED | OUTPATIENT
Start: 2021-02-19 | End: 2021-03-19 | Stop reason: SDUPTHER

## 2021-03-08 DIAGNOSIS — F41.0 PANIC ATTACK: ICD-10-CM

## 2021-03-08 RX ORDER — ALPRAZOLAM 0.5 MG/1
0.5 TABLET ORAL 2 TIMES DAILY PRN
Qty: 30 TABLET | Refills: 0 | OUTPATIENT
Start: 2021-03-08

## 2021-03-10 ENCOUNTER — OFFICE VISIT (OUTPATIENT)
Dept: FAMILY MEDICINE CLINIC | Facility: CLINIC | Age: 46
End: 2021-03-10
Payer: COMMERCIAL

## 2021-03-10 VITALS
HEIGHT: 68 IN | TEMPERATURE: 97.7 F | DIASTOLIC BLOOD PRESSURE: 72 MMHG | BODY MASS INDEX: 24.4 KG/M2 | SYSTOLIC BLOOD PRESSURE: 128 MMHG | WEIGHT: 161 LBS

## 2021-03-10 DIAGNOSIS — Z11.4 SCREENING FOR HIV (HUMAN IMMUNODEFICIENCY VIRUS): Primary | ICD-10-CM

## 2021-03-10 DIAGNOSIS — Z63.4 BEREAVEMENT: ICD-10-CM

## 2021-03-10 DIAGNOSIS — Z12.4 SCREENING FOR CERVICAL CANCER: ICD-10-CM

## 2021-03-10 DIAGNOSIS — F32.1 CURRENT MODERATE EPISODE OF MAJOR DEPRESSIVE DISORDER WITHOUT PRIOR EPISODE (HCC): ICD-10-CM

## 2021-03-10 DIAGNOSIS — F41.0 PANIC ATTACK: ICD-10-CM

## 2021-03-10 PROCEDURE — 3008F BODY MASS INDEX DOCD: CPT | Performed by: FAMILY MEDICINE

## 2021-03-10 PROCEDURE — 99213 OFFICE O/P EST LOW 20 MIN: CPT | Performed by: FAMILY MEDICINE

## 2021-03-10 PROCEDURE — 4004F PT TOBACCO SCREEN RCVD TLK: CPT | Performed by: FAMILY MEDICINE

## 2021-03-10 RX ORDER — ALPRAZOLAM 0.5 MG/1
0.5 TABLET ORAL 2 TIMES DAILY PRN
Qty: 30 TABLET | Refills: 0 | Status: SHIPPED | OUTPATIENT
Start: 2021-03-10 | End: 2021-03-24 | Stop reason: SDUPTHER

## 2021-03-10 SDOH — SOCIAL STABILITY - SOCIAL INSECURITY: DISSAPEARANCE AND DEATH OF FAMILY MEMBER: Z63.4

## 2021-03-10 NOTE — PROGRESS NOTES
Assessment/Plan:  Patient will need counseling  Patient use Xanax as needed for anxiety  Refills given at this time  Patient will start Trintellix daily as directed     Patient will continue with Cymbalta 60 mg daily for 2 weeks then decrease to 30 mg daily for 2 weeks then stop  The patient follow-up in 6-8 weeks  Diagnoses and all orders for this visit:    Screening for HIV (human immunodeficiency virus)    Screening for cervical cancer    Panic attack  -     ALPRAZolam (XANAX) 0 5 mg tablet; Take 1 tablet (0 5 mg total) by mouth 2 (two) times a day as needed for anxiety  -     Vortioxetine HBr (TRINTELLIX) 5 MG tablet; Take 1 tablet (5 mg total) by mouth daily    Current moderate episode of major depressive disorder without prior episode (HCC)  -     Vortioxetine HBr (TRINTELLIX) 5 MG tablet; Take 1 tablet (5 mg total) by mouth daily    Bereavement    Other orders  -     Cancel: HIV 1/2 Antigen/Antibody (4th Generation) w Reflex [facility]; Future  -     Cancel: Ambulatory referral to Obstetrics / Gynecology; Future            Subjective:        Patient ID: Robson Rios is a 39 y o  female  Patient is here to follow-up on depression anxiety  Patient has been having worse anxiety and depression since February  Patient feels as though she is regressing  Patient is taking medication routinely  Patient has been homebound for the past 3 weeks  Appetite is decreased  Patient having nightmares  Patient was doing bereavement counseling  Patient is not suicidal or homicidal at this time  The following portions of the patient's history were reviewed and updated as appropriate: allergies, current medications, past family history, past medical history, past social history, past surgical history and problem list       Review of Systems   Constitutional: Negative  HENT: Negative  Eyes: Negative  Respiratory: Negative  Cardiovascular: Negative  Gastrointestinal: Negative      Endocrine: Negative  Genitourinary: Negative  Musculoskeletal: Negative  Skin: Negative  Allergic/Immunologic: Negative  Neurological: Negative  Hematological: Negative  Psychiatric/Behavioral: Positive for decreased concentration, dysphoric mood and sleep disturbance  Negative for suicidal ideas  The patient is nervous/anxious  Objective:      BMI Counseling: Body mass index is 24 48 kg/m²  The BMI is above normal  Nutrition recommendations include decreasing portion sizes  Exercise recommendations include moderate physical activity 150 minutes/week  Tobacco Cessation Counseling: Tobacco cessation counseling was provided  The patient is sincerely urged to quit consumption of tobacco  She is not ready to quit tobacco            /72 (BP Location: Right arm, Patient Position: Sitting, Cuff Size: Adult)   Temp 97 7 °F (36 5 °C) (Tympanic)   Ht 5' 8" (1 727 m)   Wt 73 kg (161 lb)   BMI 24 48 kg/m²          Physical Exam  Vitals signs and nursing note reviewed  Constitutional:       General: She is not in acute distress  Appearance: Normal appearance  She is not ill-appearing, toxic-appearing or diaphoretic  HENT:      Head: Normocephalic and atraumatic  Right Ear: Tympanic membrane, ear canal and external ear normal  There is no impacted cerumen  Left Ear: Tympanic membrane, ear canal and external ear normal  There is no impacted cerumen  Nose: Nose normal  No congestion or rhinorrhea  Mouth/Throat:      Mouth: Mucous membranes are moist       Pharynx: No oropharyngeal exudate or posterior oropharyngeal erythema  Eyes:      General: No scleral icterus  Right eye: No discharge  Left eye: No discharge  Extraocular Movements: Extraocular movements intact  Conjunctiva/sclera: Conjunctivae normal       Pupils: Pupils are equal, round, and reactive to light  Neck:      Musculoskeletal: Normal range of motion and neck supple   No neck rigidity or muscular tenderness  Vascular: No carotid bruit  Cardiovascular:      Rate and Rhythm: Normal rate and regular rhythm  Pulses: Normal pulses  Heart sounds: Normal heart sounds  No murmur  No friction rub  No gallop  Pulmonary:      Effort: Pulmonary effort is normal  No respiratory distress  Breath sounds: Normal breath sounds  No stridor  No wheezing, rhonchi or rales  Chest:      Chest wall: No tenderness  Musculoskeletal: Normal range of motion  General: No swelling, tenderness, deformity or signs of injury  Right lower leg: No edema  Left lower leg: No edema  Lymphadenopathy:      Cervical: No cervical adenopathy  Skin:     General: Skin is warm and dry  Capillary Refill: Capillary refill takes less than 2 seconds  Coloration: Skin is not jaundiced  Findings: No bruising, erythema, lesion or rash  Neurological:      Mental Status: She is alert and oriented to person, place, and time  Mental status is at baseline  Cranial Nerves: No cranial nerve deficit  Sensory: No sensory deficit  Motor: No weakness  Coordination: Coordination normal       Gait: Gait normal    Psychiatric:         Behavior: Behavior normal          Thought Content:  Thought content normal          Judgment: Judgment normal       Comments: Depressed, tearful

## 2021-03-16 ENCOUNTER — TELEPHONE (OUTPATIENT)
Dept: FAMILY MEDICINE CLINIC | Facility: CLINIC | Age: 46
End: 2021-03-16

## 2021-03-17 NOTE — TELEPHONE ENCOUNTER
Please call patient  Continue Cymbalta 60 mg daily  See if she has ever been on Abilify, Risperdal, Seroquel the etcetera  Also inquire patient does have insomnia or difficulty sleeping

## 2021-03-19 DIAGNOSIS — F51.01 PRIMARY INSOMNIA: ICD-10-CM

## 2021-03-19 DIAGNOSIS — F32.1 CURRENT MODERATE EPISODE OF MAJOR DEPRESSIVE DISORDER WITHOUT PRIOR EPISODE (HCC): ICD-10-CM

## 2021-03-19 DIAGNOSIS — F41.0 PANIC ATTACK: Primary | ICD-10-CM

## 2021-03-19 RX ORDER — DESVENLAFAXINE 50 MG/1
50 TABLET, EXTENDED RELEASE ORAL DAILY
Qty: 30 TABLET | Refills: 2 | OUTPATIENT
Start: 2021-03-19 | End: 2021-03-25 | Stop reason: SDUPTHER

## 2021-03-19 RX ORDER — ZOLPIDEM TARTRATE 10 MG/1
10 TABLET ORAL
Qty: 30 TABLET | Refills: 0 | Status: SHIPPED | OUTPATIENT
Start: 2021-03-19 | End: 2021-04-22 | Stop reason: SDUPTHER

## 2021-03-19 NOTE — TELEPHONE ENCOUNTER
Patient is calling stating she is not able to afford new medication Vortioxetine 5 mg  and would like another medication  Patient is having difficulty sleeping as well and is taking the zolpidem  Patient would like a different medication called in and she has tried effexor , paxil, trazadone , prozac with no success  Please review and advise  Thank you  Patient also needs refill on zolpidem

## 2021-03-19 NOTE — TELEPHONE ENCOUNTER
Contacted patient and made her aware of the medication being called in for her and also she was advised the Rusty Gamma was refilled as well  Patient verbally aware and prescription was called into pharmacy

## 2021-03-24 DIAGNOSIS — F41.0 PANIC ATTACK: ICD-10-CM

## 2021-03-24 RX ORDER — ALPRAZOLAM 0.5 MG/1
0.5 TABLET ORAL 2 TIMES DAILY PRN
Qty: 30 TABLET | Refills: 0 | Status: SHIPPED | OUTPATIENT
Start: 2021-03-24 | End: 2021-04-06 | Stop reason: SDUPTHER

## 2021-03-25 DIAGNOSIS — F41.0 PANIC ATTACK: ICD-10-CM

## 2021-03-25 DIAGNOSIS — F32.1 CURRENT MODERATE EPISODE OF MAJOR DEPRESSIVE DISORDER WITHOUT PRIOR EPISODE (HCC): ICD-10-CM

## 2021-03-25 DIAGNOSIS — F51.01 PRIMARY INSOMNIA: ICD-10-CM

## 2021-03-25 RX ORDER — DESVENLAFAXINE 50 MG/1
50 TABLET, EXTENDED RELEASE ORAL DAILY
Qty: 30 TABLET | Refills: 2 | Status: SHIPPED | OUTPATIENT
Start: 2021-03-25 | End: 2021-04-22 | Stop reason: SDUPTHER

## 2021-03-25 NOTE — TELEPHONE ENCOUNTER
Spoke with [employer] and she stated that the insurance will pay for the generic brand of Desvenlafaxine Succinate (Pristiq) 50 mg tabs instead of the brand name one that was requested  I will place another order in for genic brand only today

## 2021-03-25 NOTE — TELEPHONE ENCOUNTER
Received call from [employer] that [facility] is requiring prior authorization for generic Pristiq  Morelia's phone number is [phone]

## 2021-03-25 NOTE — TELEPHONE ENCOUNTER
Spoke with insurance company about an hours ago  I have placed and order for Dr Minaya to sign since Dr Eddi Conroy is out, when RX is sign it will e-scribe to pharmacy of choice  I have already alerted pharmacy of choice of the approval status for genic today

## 2021-04-06 DIAGNOSIS — F41.0 PANIC ATTACK: ICD-10-CM

## 2021-04-06 RX ORDER — ALPRAZOLAM 0.5 MG/1
0.5 TABLET ORAL 2 TIMES DAILY PRN
Qty: 30 TABLET | Refills: 0 | Status: SHIPPED | OUTPATIENT
Start: 2021-04-06 | End: 2021-04-22 | Stop reason: SDUPTHER

## 2021-04-22 DIAGNOSIS — F32.1 CURRENT MODERATE EPISODE OF MAJOR DEPRESSIVE DISORDER WITHOUT PRIOR EPISODE (HCC): ICD-10-CM

## 2021-04-22 DIAGNOSIS — F41.0 PANIC ATTACK: ICD-10-CM

## 2021-04-22 DIAGNOSIS — F51.01 PRIMARY INSOMNIA: ICD-10-CM

## 2021-04-22 RX ORDER — ZOLPIDEM TARTRATE 10 MG/1
10 TABLET ORAL
Qty: 30 TABLET | Refills: 2 | Status: SHIPPED | OUTPATIENT
Start: 2021-04-22 | End: 2021-07-15 | Stop reason: SDUPTHER

## 2021-04-22 RX ORDER — DESVENLAFAXINE 50 MG/1
50 TABLET, EXTENDED RELEASE ORAL DAILY
Qty: 30 TABLET | Refills: 0 | Status: SHIPPED | OUTPATIENT
Start: 2021-04-22 | End: 2021-05-06 | Stop reason: SDUPTHER

## 2021-04-22 RX ORDER — ALPRAZOLAM 0.5 MG/1
0.5 TABLET ORAL 2 TIMES DAILY PRN
Qty: 60 TABLET | Refills: 0 | Status: SHIPPED | OUTPATIENT
Start: 2021-04-22 | End: 2021-05-21 | Stop reason: SDUPTHER

## 2021-04-26 ENCOUNTER — TELEPHONE (OUTPATIENT)
Dept: FAMILY MEDICINE CLINIC | Facility: CLINIC | Age: 46
End: 2021-04-26

## 2021-04-26 NOTE — TELEPHONE ENCOUNTER
Called patient and left detailed message that we did the Prior Auth for Pristiq and it is pending- once we hear from the insurance company we will inform her of approval or denial     Patient to call back if any questions

## 2021-04-29 ENCOUNTER — RA CDI HCC (OUTPATIENT)
Dept: OTHER | Facility: HOSPITAL | Age: 46
End: 2021-04-29

## 2021-05-06 ENCOUNTER — TELEPHONE (OUTPATIENT)
Dept: FAMILY MEDICINE CLINIC | Facility: CLINIC | Age: 46
End: 2021-05-06

## 2021-05-06 ENCOUNTER — OFFICE VISIT (OUTPATIENT)
Dept: FAMILY MEDICINE CLINIC | Facility: CLINIC | Age: 46
End: 2021-05-06
Payer: COMMERCIAL

## 2021-05-06 VITALS
BODY MASS INDEX: 23.49 KG/M2 | WEIGHT: 155 LBS | TEMPERATURE: 96.5 F | DIASTOLIC BLOOD PRESSURE: 70 MMHG | SYSTOLIC BLOOD PRESSURE: 102 MMHG | HEIGHT: 68 IN

## 2021-05-06 DIAGNOSIS — Z00.00 WELL ADULT EXAM: Primary | ICD-10-CM

## 2021-05-06 DIAGNOSIS — F41.0 PANIC ATTACK: ICD-10-CM

## 2021-05-06 DIAGNOSIS — F51.01 PRIMARY INSOMNIA: ICD-10-CM

## 2021-05-06 DIAGNOSIS — F32.1 CURRENT MODERATE EPISODE OF MAJOR DEPRESSIVE DISORDER WITHOUT PRIOR EPISODE (HCC): ICD-10-CM

## 2021-05-06 PROCEDURE — 99213 OFFICE O/P EST LOW 20 MIN: CPT | Performed by: FAMILY MEDICINE

## 2021-05-06 RX ORDER — DESVENLAFAXINE 100 MG/1
100 TABLET, EXTENDED RELEASE ORAL DAILY
Qty: 30 TABLET | Refills: 2 | Status: SHIPPED | OUTPATIENT
Start: 2021-05-06 | End: 2021-11-04 | Stop reason: SDUPTHER

## 2021-05-06 NOTE — PROGRESS NOTES
Assessment/Plan:  Labs reviewed  Patient had colonoscopy last month  Patient also had EGD  Patient will be going for CT scan of the abdomen and pelvis tomorrow  Patient have Pristiq increased to 100 mg daily  Follow-up in 6 weeks       Diagnoses and all orders for this visit:    Well adult exam    Primary insomnia  -     desvenlafaxine succinate (PRISTIQ) 100 mg 24 hr tablet; Take 1 tablet (100 mg total) by mouth daily Genic brand only take 1 tab by mouth daily  Panic attack  -     desvenlafaxine succinate (PRISTIQ) 100 mg 24 hr tablet; Take 1 tablet (100 mg total) by mouth daily Genic brand only take 1 tab by mouth daily  Current moderate episode of major depressive disorder without prior episode (HCC)  -     desvenlafaxine succinate (PRISTIQ) 100 mg 24 hr tablet; Take 1 tablet (100 mg total) by mouth daily Genic brand only take 1 tab by mouth daily  Subjective:        Patient ID: Vanessa Natarajan is a 39 y o  female  Patient is here for wellness exam   Patient is having anxiety as well as depressive symptoms intermittently  Patient is on Pristiq  Pristiq is causing 150 dollars month  The following portions of the patient's history were reviewed and updated as appropriate: allergies, current medications, past family history, past medical history, past social history, past surgical history and problem list       Review of Systems   Constitutional: Negative  HENT: Negative  Eyes: Negative  Respiratory: Negative  Cardiovascular: Negative  Gastrointestinal: Negative  Endocrine: Negative  Genitourinary: Negative  Musculoskeletal: Negative  Skin: Negative  Allergic/Immunologic: Negative  Neurological: Negative  Hematological: Negative  Psychiatric/Behavioral: Positive for dysphoric mood  The patient is nervous/anxious  Objective: Tobacco Cessation Counseling: Tobacco cessation counseling was provided   The patient is sincerely urged to quit consumption of tobacco  She is not ready to quit tobacco            /70 (BP Location: Right arm, Patient Position: Sitting, Cuff Size: Adult)   Temp (!) 96 5 °F (35 8 °C) (Tympanic)   Ht 5' 8" (1 727 m)   Wt 70 3 kg (155 lb)   BMI 23 57 kg/m²          Physical Exam  Vitals signs and nursing note reviewed  Constitutional:       General: She is not in acute distress  Appearance: Normal appearance  She is not ill-appearing, toxic-appearing or diaphoretic  HENT:      Head: Normocephalic and atraumatic  Right Ear: Tympanic membrane, ear canal and external ear normal  There is no impacted cerumen  Left Ear: Tympanic membrane, ear canal and external ear normal  There is no impacted cerumen  Nose: Nose normal  No congestion or rhinorrhea  Mouth/Throat:      Mouth: Mucous membranes are moist       Pharynx: No oropharyngeal exudate or posterior oropharyngeal erythema  Eyes:      General: No scleral icterus  Right eye: No discharge  Left eye: No discharge  Extraocular Movements: Extraocular movements intact  Conjunctiva/sclera: Conjunctivae normal       Pupils: Pupils are equal, round, and reactive to light  Neck:      Musculoskeletal: Normal range of motion and neck supple  No neck rigidity or muscular tenderness  Vascular: No carotid bruit  Cardiovascular:      Rate and Rhythm: Normal rate and regular rhythm  Pulses: Normal pulses  Heart sounds: Normal heart sounds  No murmur  No friction rub  No gallop  Pulmonary:      Effort: Pulmonary effort is normal  No respiratory distress  Breath sounds: Normal breath sounds  No stridor  No wheezing, rhonchi or rales  Chest:      Chest wall: No tenderness  Musculoskeletal: Normal range of motion  General: No swelling, tenderness, deformity or signs of injury  Right lower leg: No edema  Left lower leg: No edema     Lymphadenopathy:      Cervical: No cervical adenopathy  Skin:     General: Skin is warm and dry  Capillary Refill: Capillary refill takes less than 2 seconds  Coloration: Skin is not jaundiced  Findings: No bruising, erythema, lesion or rash  Neurological:      General: No focal deficit present  Mental Status: She is alert and oriented to person, place, and time  Cranial Nerves: No cranial nerve deficit  Sensory: No sensory deficit  Motor: No weakness  Coordination: Coordination normal       Gait: Gait normal    Psychiatric:         Behavior: Behavior normal          Thought Content:  Thought content normal          Judgment: Judgment normal       Comments: Depressed

## 2021-05-06 NOTE — TELEPHONE ENCOUNTER
Patient was here for visit today  Patient states that Pristiq requires prior authorization  Dose has been changed from 50 mg to 100 mg  Patient will call insurance company about this, she is asking for you to start this ASAP  Thank you

## 2021-05-12 ENCOUNTER — TELEPHONE (OUTPATIENT)
Dept: FAMILY MEDICINE CLINIC | Facility: CLINIC | Age: 46
End: 2021-05-12

## 2021-05-12 NOTE — TELEPHONE ENCOUNTER
Called patients' insurance plan and spoke with the PA dept on her medication of Dexfenfluramine Succinate  Inform on the patient, Doctor and medication to intake rep case # given Lizett Collins [id number]  Notefied patient today 05/12/21 of action taken  And give case #  Patient was not agreeable and she will call her insurance herself to speed up process

## 2021-05-17 ENCOUNTER — TELEPHONE (OUTPATIENT)
Dept: FAMILY MEDICINE CLINIC | Facility: CLINIC | Age: 46
End: 2021-05-17

## 2021-05-17 NOTE — TELEPHONE ENCOUNTER
Patient was denied for medication of Desvenlafaxine Succi 50 mg tabs and has to try listed that was faxed over last week  Give to Dr Garrison Ferrari for his advisement today

## 2021-05-21 DIAGNOSIS — F41.0 PANIC ATTACK: ICD-10-CM

## 2021-05-21 RX ORDER — ALPRAZOLAM 0.5 MG/1
0.5 TABLET ORAL 2 TIMES DAILY PRN
Qty: 60 TABLET | Refills: 0 | Status: SHIPPED | OUTPATIENT
Start: 2021-05-21 | End: 2021-06-15 | Stop reason: SDUPTHER

## 2021-05-21 NOTE — TELEPHONE ENCOUNTER
As per Dr Lazarus Slim, the above was created and faxed today back to insurance appeals det ; awaiting decision

## 2021-06-15 ENCOUNTER — OFFICE VISIT (OUTPATIENT)
Dept: FAMILY MEDICINE CLINIC | Facility: CLINIC | Age: 46
End: 2021-06-15
Payer: COMMERCIAL

## 2021-06-15 VITALS
WEIGHT: 152 LBS | DIASTOLIC BLOOD PRESSURE: 78 MMHG | SYSTOLIC BLOOD PRESSURE: 110 MMHG | TEMPERATURE: 96.8 F | BODY MASS INDEX: 23.04 KG/M2 | HEIGHT: 68 IN

## 2021-06-15 DIAGNOSIS — Z00.00 WELL ADULT EXAM: Primary | ICD-10-CM

## 2021-06-15 DIAGNOSIS — F51.01 PRIMARY INSOMNIA: ICD-10-CM

## 2021-06-15 DIAGNOSIS — F41.0 PANIC ATTACK: ICD-10-CM

## 2021-06-15 PROCEDURE — 99396 PREV VISIT EST AGE 40-64: CPT | Performed by: FAMILY MEDICINE

## 2021-06-15 PROCEDURE — 3008F BODY MASS INDEX DOCD: CPT | Performed by: FAMILY MEDICINE

## 2021-06-15 PROCEDURE — 4004F PT TOBACCO SCREEN RCVD TLK: CPT | Performed by: FAMILY MEDICINE

## 2021-06-15 RX ORDER — ALPRAZOLAM 0.5 MG/1
0.5 TABLET ORAL 2 TIMES DAILY PRN
Qty: 60 TABLET | Refills: 0 | Status: SHIPPED | OUTPATIENT
Start: 2021-06-15 | End: 2021-07-15 | Stop reason: SDUPTHER

## 2021-06-15 NOTE — PROGRESS NOTES
Assessment/Plan:  Vaccines up-to-date  Labs reviewed  Patient is on waiting list for gynecologist   Patient have mammogram at that time  Patient status post colonoscopy  Patient does have some monthly self-breast exams  Refills given at this time  Continue with Pristiq  Patient will follow-up in 2 months       Diagnoses and all orders for this visit:    Well adult exam    Primary insomnia    Panic attack  -     ALPRAZolam (XANAX) 0 5 mg tablet; Take 1 tablet (0 5 mg total) by mouth 2 (two) times a day as needed for anxiety            Subjective:        Patient ID: Mya Meehan is a 39 y o  female  Patient for wellness exam   Labs and vaccines reviewed  Patient is on waiting list to see gynecologist  Patient last mammogram was 4-5 years ago  Patient did have colonoscopy as well as EGD just recently  Patient status post CT scan of the abdomen/pelvis  Labs records reviewed  The following portions of the patient's history were reviewed and updated as appropriate: allergies, current medications, past family history, past medical history, past social history, past surgical history and problem list       Review of Systems   Constitutional: Negative  HENT: Negative  Eyes: Negative  Respiratory: Negative  Cardiovascular: Negative  Gastrointestinal: Negative  Endocrine: Negative  Genitourinary: Negative  Musculoskeletal: Negative  Skin: Negative  Allergic/Immunologic: Negative  Neurological: Negative  Hematological: Negative  Psychiatric/Behavioral: Positive for dysphoric mood  Objective: Tobacco Cessation Counseling: Tobacco cessation counseling was provided  The patient is sincerely urged to quit consumption of tobacco  She is not ready to quit tobacco  Medication options discussed             /78 (BP Location: Right arm, Patient Position: Sitting, Cuff Size: Adult)   Temp (!) 96 8 °F (36 °C) (Tympanic)   Ht 5' 8" (1 727 m)   Wt 68 9 kg (152 lb)   BMI 23 11 kg/m²          Physical Exam  Vitals and nursing note reviewed  Constitutional:       General: She is not in acute distress  Appearance: Normal appearance  She is not ill-appearing, toxic-appearing or diaphoretic  HENT:      Head: Normocephalic and atraumatic  Right Ear: Tympanic membrane, ear canal and external ear normal  There is no impacted cerumen  Left Ear: Tympanic membrane, ear canal and external ear normal  There is no impacted cerumen  Nose: Nose normal  No congestion or rhinorrhea  Mouth/Throat:      Mouth: Mucous membranes are moist       Pharynx: No oropharyngeal exudate or posterior oropharyngeal erythema  Eyes:      General: No scleral icterus  Right eye: No discharge  Left eye: No discharge  Extraocular Movements: Extraocular movements intact  Conjunctiva/sclera: Conjunctivae normal       Pupils: Pupils are equal, round, and reactive to light  Neck:      Vascular: No carotid bruit  Cardiovascular:      Rate and Rhythm: Normal rate and regular rhythm  Pulses: Normal pulses  Heart sounds: Normal heart sounds  No murmur heard  No friction rub  No gallop  Pulmonary:      Effort: Pulmonary effort is normal  No respiratory distress  Breath sounds: Normal breath sounds  No stridor  No wheezing, rhonchi or rales  Chest:      Chest wall: No tenderness  Abdominal:      General: Abdomen is flat  Bowel sounds are normal  There is no distension  Palpations: Abdomen is soft  Tenderness: There is abdominal tenderness  There is no guarding or rebound  Musculoskeletal:         General: No swelling, tenderness, deformity or signs of injury  Normal range of motion  Cervical back: Normal range of motion and neck supple  No rigidity  No muscular tenderness  Right lower leg: No edema  Left lower leg: No edema  Lymphadenopathy:      Cervical: No cervical adenopathy     Skin:     General: Skin is warm and dry  Capillary Refill: Capillary refill takes less than 2 seconds  Coloration: Skin is not jaundiced  Findings: No bruising, erythema, lesion or rash  Neurological:      Mental Status: She is alert and oriented to person, place, and time  Mental status is at baseline  Cranial Nerves: No cranial nerve deficit  Sensory: No sensory deficit  Motor: No weakness  Coordination: Coordination normal       Gait: Gait normal    Psychiatric:         Behavior: Behavior normal          Thought Content:  Thought content normal          Judgment: Judgment normal       Comments: Mildly depressed

## 2021-07-09 ENCOUNTER — OFFICE VISIT (OUTPATIENT)
Dept: FAMILY MEDICINE CLINIC | Facility: CLINIC | Age: 46
End: 2021-07-09
Payer: COMMERCIAL

## 2021-07-09 VITALS
TEMPERATURE: 98 F | HEIGHT: 68 IN | SYSTOLIC BLOOD PRESSURE: 130 MMHG | HEART RATE: 113 BPM | BODY MASS INDEX: 23.04 KG/M2 | OXYGEN SATURATION: 98 % | DIASTOLIC BLOOD PRESSURE: 86 MMHG | WEIGHT: 152 LBS

## 2021-07-09 DIAGNOSIS — M54.12 CERVICAL RADICULITIS: Primary | ICD-10-CM

## 2021-07-09 PROCEDURE — 99213 OFFICE O/P EST LOW 20 MIN: CPT | Performed by: FAMILY MEDICINE

## 2021-07-09 PROCEDURE — 3008F BODY MASS INDEX DOCD: CPT | Performed by: FAMILY MEDICINE

## 2021-07-09 PROCEDURE — 4004F PT TOBACCO SCREEN RCVD TLK: CPT | Performed by: FAMILY MEDICINE

## 2021-07-09 RX ORDER — DESVENLAFAXINE 50 MG/1
50 TABLET, EXTENDED RELEASE ORAL DAILY
COMMUNITY
Start: 2021-05-14 | End: 2021-08-17 | Stop reason: ALTCHOICE

## 2021-07-09 RX ORDER — DIPHENHYDRAMINE HCL 50 MG
50 CAPSULE ORAL DAILY PRN
COMMUNITY

## 2021-07-09 RX ORDER — TIZANIDINE 4 MG/1
4 TABLET ORAL EVERY 8 HOURS PRN
Qty: 90 TABLET | Refills: 0 | Status: SHIPPED | OUTPATIENT
Start: 2021-07-09 | End: 2021-07-21

## 2021-07-09 RX ORDER — DULOXETIN HYDROCHLORIDE 30 MG/1
30 CAPSULE, DELAYED RELEASE ORAL DAILY
COMMUNITY
Start: 2021-04-05 | End: 2021-07-09

## 2021-07-09 RX ORDER — METHYLPREDNISOLONE ACETATE 80 MG/ML
80 INJECTION, SUSPENSION INTRA-ARTICULAR; INTRALESIONAL; INTRAMUSCULAR; SOFT TISSUE ONCE
Status: CANCELLED | OUTPATIENT
Start: 2021-07-09 | End: 2021-07-09

## 2021-07-09 RX ORDER — DEXAMETHASONE SODIUM PHOSPHATE 4 MG/ML
1 INJECTION, SOLUTION INTRA-ARTICULAR; INTRALESIONAL; INTRAMUSCULAR; INTRAVENOUS; SOFT TISSUE
COMMUNITY
End: 2021-07-09

## 2021-07-09 RX ORDER — KETOROLAC TROMETHAMINE 30 MG/ML
60 INJECTION, SOLUTION INTRAMUSCULAR; INTRAVENOUS ONCE
Status: CANCELLED | OUTPATIENT
Start: 2021-07-09 | End: 2021-07-14

## 2021-07-09 RX ORDER — BUPIVACAINE HYDROCHLORIDE 5 MG/ML
3 INJECTION, SOLUTION PERINEURAL
COMMUNITY
End: 2021-08-17 | Stop reason: ALTCHOICE

## 2021-07-09 NOTE — PROGRESS NOTES
Assessment/Plan: supportive care recommended  Patient use tizanidine and night and as directed  Patient will receive  Toradol 60 mg IM as well as Depo-Medrol 80 mg  Patient will follow-up if not seeing improvement over the next week or so  To consider physical therapy patient will continue with exercises       Diagnoses and all orders for this visit:    Cervical radiculitis  -     tiZANidine (ZANAFLEX) 4 mg tablet; Take 1 tablet (4 mg total) by mouth every 8 (eight) hours as needed for muscle spasms    Other orders  -     bupivacaine (MARCAINE) 0 5 %; 3 mL  -     Discontinue: dexamethasone (DECADRON) 4 mg/mL; 1 mL  -     Discontinue: DULoxetine (CYMBALTA) 30 mg delayed release capsule; Take 30 mg by mouth daily  -     desvenlafaxine succinate (PRISTIQ) 50 mg 24 hr tablet; Take 50 mg by mouth daily  -     diphenhydrAMINE (BENADRYL) 50 mg capsule; Take 50 mg by mouth daily as needed            Subjective:        Patient ID: Skylar Sow is a 39 y o  female  Patient is here with neck pain which began 2 weeks ago  Patient was on camping trip doing some kayaking which was difficult causing some pain in her upper arms as well as neck left side  Arm pain has resolved  No chest pain or shortness of breath associated with neck pain  Patient does notice some numbness in her hands and feet intermittently  Patient is using ibuprofen, ice, as well as exercises  Patient with decreased sleep due to pain  The following portions of the patient's history were reviewed and updated as appropriate: allergies, current medications, past family history, past medical history, past social history, past surgical history and problem list       Review of Systems   Constitutional: Negative  HENT: Negative  Eyes: Negative  Respiratory: Negative  Cardiovascular: Negative  Gastrointestinal: Negative  Endocrine: Negative  Genitourinary: Negative  Musculoskeletal: Positive for arthralgias     Skin: Negative  Allergic/Immunologic: Negative  Neurological: Positive for numbness  Hematological: Negative  Psychiatric/Behavioral: Negative  Objective: Tobacco Cessation Counseling: Tobacco cessation counseling was provided  The patient is sincerely urged to quit consumption of tobacco  She is not ready to quit tobacco            /86   Pulse (!) 113   Temp 98 °F (36 7 °C)   Ht 5' 8" (1 727 m)   Wt 68 9 kg (152 lb)   SpO2 98%   BMI 23 11 kg/m²          Physical Exam  Vitals and nursing note reviewed  Constitutional:       General: She is in acute distress  Appearance: She is not ill-appearing, toxic-appearing or diaphoretic  HENT:      Head: Normocephalic and atraumatic  Right Ear: Tympanic membrane, ear canal and external ear normal  There is no impacted cerumen  Left Ear: Tympanic membrane, ear canal and external ear normal  There is no impacted cerumen  Nose: Nose normal  No congestion or rhinorrhea  Mouth/Throat:      Mouth: Mucous membranes are moist       Pharynx: No oropharyngeal exudate or posterior oropharyngeal erythema  Eyes:      General: No scleral icterus  Right eye: No discharge  Left eye: No discharge  Extraocular Movements: Extraocular movements intact  Conjunctiva/sclera: Conjunctivae normal       Pupils: Pupils are equal, round, and reactive to light  Neck:      Vascular: No carotid bruit  Cardiovascular:      Rate and Rhythm: Normal rate and regular rhythm  Pulses: Normal pulses  Heart sounds: Normal heart sounds  No murmur heard  No friction rub  No gallop  Pulmonary:      Effort: Pulmonary effort is normal  No respiratory distress  Breath sounds: Normal breath sounds  No stridor  No wheezing, rhonchi or rales  Chest:      Chest wall: No tenderness  Musculoskeletal:         General: Tenderness present  No swelling, deformity or signs of injury        Cervical back: Normal range of motion and neck supple  No rigidity  No muscular tenderness  Right lower leg: No edema  Left lower leg: No edema  Lymphadenopathy:      Cervical: No cervical adenopathy  Skin:     General: Skin is warm and dry  Capillary Refill: Capillary refill takes less than 2 seconds  Coloration: Skin is not jaundiced  Findings: No bruising, erythema, lesion or rash  Neurological:      General: No focal deficit present  Mental Status: She is alert and oriented to person, place, and time  Cranial Nerves: No cranial nerve deficit  Sensory: No sensory deficit  Motor: No weakness  Coordination: Coordination normal       Gait: Gait normal    Psychiatric:         Mood and Affect: Mood normal          Behavior: Behavior normal          Thought Content:  Thought content normal          Judgment: Judgment normal

## 2021-07-12 RX ORDER — KETOROLAC TROMETHAMINE 30 MG/ML
60 INJECTION, SOLUTION INTRAMUSCULAR; INTRAVENOUS ONCE
Status: COMPLETED | OUTPATIENT
Start: 2021-07-12 | End: 2021-07-12

## 2021-07-12 RX ORDER — METHYLPREDNISOLONE ACETATE 80 MG/ML
80 INJECTION, SUSPENSION INTRA-ARTICULAR; INTRALESIONAL; INTRAMUSCULAR; SOFT TISSUE ONCE
Status: COMPLETED | OUTPATIENT
Start: 2021-07-12 | End: 2021-07-12

## 2021-07-12 RX ADMIN — KETOROLAC TROMETHAMINE 60 MG: 30 INJECTION, SOLUTION INTRAMUSCULAR; INTRAVENOUS at 13:27

## 2021-07-12 RX ADMIN — METHYLPREDNISOLONE ACETATE 80 MG: 80 INJECTION, SUSPENSION INTRA-ARTICULAR; INTRALESIONAL; INTRAMUSCULAR; SOFT TISSUE at 13:28

## 2021-07-21 ENCOUNTER — TELEPHONE (OUTPATIENT)
Dept: ADMINISTRATIVE | Facility: OTHER | Age: 46
End: 2021-07-21

## 2021-07-21 ENCOUNTER — OFFICE VISIT (OUTPATIENT)
Dept: FAMILY MEDICINE CLINIC | Facility: CLINIC | Age: 46
End: 2021-07-21
Payer: COMMERCIAL

## 2021-07-21 ENCOUNTER — APPOINTMENT (OUTPATIENT)
Dept: RADIOLOGY | Facility: MEDICAL CENTER | Age: 46
End: 2021-07-21
Payer: COMMERCIAL

## 2021-07-21 VITALS
DIASTOLIC BLOOD PRESSURE: 76 MMHG | TEMPERATURE: 97 F | BODY MASS INDEX: 22.88 KG/M2 | WEIGHT: 151 LBS | SYSTOLIC BLOOD PRESSURE: 120 MMHG | HEIGHT: 68 IN

## 2021-07-21 DIAGNOSIS — M54.12 CERVICAL RADICULITIS: Primary | ICD-10-CM

## 2021-07-21 DIAGNOSIS — M54.12 CERVICAL RADICULITIS: ICD-10-CM

## 2021-07-21 PROCEDURE — 99213 OFFICE O/P EST LOW 20 MIN: CPT | Performed by: FAMILY MEDICINE

## 2021-07-21 PROCEDURE — 4004F PT TOBACCO SCREEN RCVD TLK: CPT | Performed by: FAMILY MEDICINE

## 2021-07-21 PROCEDURE — 72050 X-RAY EXAM NECK SPINE 4/5VWS: CPT

## 2021-07-21 RX ORDER — METHOCARBAMOL 500 MG/1
500 TABLET, FILM COATED ORAL 4 TIMES DAILY
Qty: 60 TABLET | Refills: 1 | Status: SHIPPED | OUTPATIENT
Start: 2021-07-21 | End: 2021-08-17 | Stop reason: SDUPTHER

## 2021-07-21 RX ORDER — KETOROLAC TROMETHAMINE 30 MG/ML
60 INJECTION, SOLUTION INTRAMUSCULAR; INTRAVENOUS ONCE
Status: COMPLETED | OUTPATIENT
Start: 2021-07-21 | End: 2021-07-21

## 2021-07-21 RX ORDER — KETOROLAC TROMETHAMINE 10 MG/1
10 TABLET, FILM COATED ORAL EVERY 6 HOURS PRN
Qty: 20 TABLET | Refills: 0 | Status: SHIPPED | OUTPATIENT
Start: 2021-07-21 | End: 2021-08-17

## 2021-07-21 RX ADMIN — KETOROLAC TROMETHAMINE 60 MG: 30 INJECTION, SOLUTION INTRAMUSCULAR; INTRAVENOUS at 13:42

## 2021-07-21 NOTE — PROGRESS NOTES
Assessment/Plan:  Patient go for x-ray of cervical spine  Patient will stop tizanidine  Toradol 60 IM given at this time  Patient use Toradol as needed as well as Robaxin  Patient will start physical therapy  Diagnoses and all orders for this visit:    Cervical radiculitis  -     XR spine cervical complete 4 or 5 vw non injury; Future  -     ketorolac (TORADOL) 10 mg tablet; Take 1 tablet (10 mg total) by mouth every 6 (six) hours as needed for moderate pain  -     methocarbamol (ROBAXIN) 500 mg tablet; Take 1 tablet (500 mg total) by mouth 4 (four) times a day  -     Ambulatory referral to Physical Therapy; Future            Subjective:        Patient ID: Saúl Bonilla is a 39 y o  female  Patient is here to follow-up on cervical radiculitis  Patient still with ongoing cervical pain on the left  Patient does have some tingling in her left hand as well as right hand and both feet  No chest pain or shortness of breath  No fever rash noted  Toradol did help  The following portions of the patient's history were reviewed and updated as appropriate: allergies, current medications, past family history, past medical history, past social history, past surgical history and problem list       Review of Systems   Constitutional: Negative  HENT: Negative  Eyes: Negative  Respiratory: Negative  Cardiovascular: Negative  Gastrointestinal: Negative  Endocrine: Negative  Genitourinary: Negative  Musculoskeletal: Positive for neck pain  Skin: Negative  Allergic/Immunologic: Negative  Neurological: Positive for numbness  Hematological: Negative  Psychiatric/Behavioral: Negative  Objective: Tobacco Cessation Counseling: Tobacco cessation counseling was provided   The patient is sincerely urged to quit consumption of tobacco  She is not ready to quit tobacco            /76 (BP Location: Right arm, Patient Position: Sitting, Cuff Size: Adult)   Temp (!) 97 °F (36 1 °C) (Tympanic)   Ht 5' 8" (1 727 m)   Wt 68 5 kg (151 lb)   BMI 22 96 kg/m²          Physical Exam

## 2021-07-22 DIAGNOSIS — M54.12 CERVICAL RADICULITIS: Primary | ICD-10-CM

## 2021-07-28 ENCOUNTER — EVALUATION (OUTPATIENT)
Dept: PHYSICAL THERAPY | Facility: REHABILITATION | Age: 46
End: 2021-07-28
Payer: COMMERCIAL

## 2021-07-28 DIAGNOSIS — M54.12 CERVICAL RADICULITIS: Primary | ICD-10-CM

## 2021-07-28 PROCEDURE — 97140 MANUAL THERAPY 1/> REGIONS: CPT

## 2021-07-28 PROCEDURE — 97161 PT EVAL LOW COMPLEX 20 MIN: CPT

## 2021-07-28 NOTE — PROGRESS NOTES
PT Evaluation     Today's date: 2021  Patient name: Crystal Sterling  : 1975  MRN: [id number]  Referring provider: Ayla Langston DO  Dx:   Encounter Diagnosis     ICD-10-CM    1  Cervical radiculitis  M54 12                   Assessment  Assessment details: Pt is a 39 y o  female presenting to outpatient physical therapy with Cervical radiculitis  (primary encounter diagnosis)  Upon eval on this date pt presents with decreased ROM, decreased LUE strength, and increased TTP over L upper trapezius and scalene musculature  Due to deficits listed above as well as significant pain reports, pt has significant impairments with ADLs and functional mobility  Pt had no change in pain reported at end of initial evaluation  Throughout initial evaluation pt was observed to be guarded with movements but was able to complete all to the best of her ability when asked  Pt reported no change in symptoms with cervical distraction but did not increased in discomfort upon release  Pt was educated on importance of continuing to complete neck ROM exercises well as stretching to decrease muscle tension and improve ability to complete ADLs with increased ease and safety  Pt is a good candidate for outpatient physical therapy and would benefit from skilled physical therapy to address limitations and to achieve goals  Thank you for this referral    Impairments: abnormal muscle tone, abnormal or restricted ROM, abnormal movement, activity intolerance, impaired physical strength, lacks appropriate home exercise program, pain with function and poor posture     Symptom irritability: highUnderstanding of Dx/Px/POC: good   Prognosis: good    Goals  ST  Patient will report 25% decrease in pain in 4 weeks  2  Patient will demonstrate 25% improvement in ROM in 4 weeks  3  Patient will report being able to sleep with 25% improvement in 4 weeks  LT   Patient will be able to perform IADLS without restriction or pain by discharge  2  Patient will be independent in HEP by discharge  3  Patient will be able to return to recreational/work duties without restriction or pain by discharge  4  Pt will demonstrate WFL cervical ROM to demonstrate improved ease and safety with ADLs by discharge  5  Pt will report a 75% or greater improvement in ability to sleep without increased neck pain by discharge  Plan  Patient would benefit from: PT eval and skilled PT  Planned modality interventions: cryotherapy, thermotherapy: hydrocollator packs, electrical stimulation/Russian stimulation and TENS  Planned therapy interventions: IADL retraining, body mechanics training, flexibility, functional ROM exercises, home exercise program, neuromuscular re-education, manual therapy, postural training, strengthening, stretching, therapeutic activities, therapeutic exercise, joint mobilization, activity modification, massage and patient education  Frequency: 2x week  Duration in visits: 8  Duration in weeks: 4  Treatment plan discussed with: patient        Subjective Evaluation    History of Present Illness  Mechanism of injury: Pt reports that her neck pain started when she was on a kayak trip at the end of   Pt reports that she really had to dig the paddles to move since the water was so low and the kayak was getting stuck on the ground  Pt reports that she gets stabbing pain on the L side of her neck and at the base of her skull  Pt notes that the pain stops at her shoulder blade  Pt reports that she is limited in day to day activities because of her neck  She notes she is unable to sleep, bend down, look in front of her  Pt reports no dizziness, nausea, double vision, or difficulty swallowing  Pt reports that she currently works as an  and works out of her home  Pt reports that she also has an MRI scheduled for next week,  in the morning  Not a recurrent problem   Pain  Pain scale: 6-7    At best pain ratin (after medication)  At worst pain ratin  Quality: "stabbing in the brain"   Relieving factors: medications, ice and heat  Aggravating factors: overhead activity and lifting (moving neck in general)  Progression: no change    Hand dominance: left      Diagnostic Tests  X-ray: normal (INDICATION:   M54 12: Radiculopathy, cervical region )  Treatments  Previous treatment: medication  Current treatment: medication  Patient Goals  Patient goals for therapy: decreased pain, increased motion, increased strength, independence with ADLs/IADLs and return to sport/leisure activities  Patient goal: "I want my neck to be normal"     "INDICATION:   M54 12: Radiculopathy, cervical region      COMPARISON:  None     VIEWS:  XR SPINE CERVICAL COMPLETE 4 OR 5 VW NON INJURY      FINDINGS:  No fracture  Normal alignment without subluxation  The intervertebral disc spaces are preserved  The neuroforamina are patent  The prevertebral soft tissues are within normal limits  The lung apices are clear      IMPRESSION:  Unremarkable cervical spine"    Objective     Concurrent Complaints  Positive for disturbed sleep  Negative for dizziness, headaches, tinnitus and trouble swallowing    Postural Observations  Seated posture: fair        Palpation   Left   Tenderness of the scalenes, sternocleidomastoid, suboccipitals and upper trapezius  Right   Tenderness of the suboccipitals and upper trapezius       Neurological Testing     Sensation   Cervical/Thoracic   Left   Intact: light touch    Right   Intact: light touch    Active Range of Motion   Cervical/Thoracic Spine       Cervical    Flexion: 15 degrees  with pain  Extension: 25 degrees     with pain  Left lateral flexion: 10 degrees     with pain  Right lateral flexion: 25 ("stabbing pain") degrees     with pain  Left rotation: 35 degrees  Right rotation: 10 degrees    with pain    Joint Play     Pain: C4, C5, C6 and C7     Strength/Myotome Testing     Left Shoulder     Planes of Motion   Flexion: 3+ (painful)   Abduction: 3+   External rotation at 0°: 4   Internal rotation at 0°: 4     Left Elbow   Flexion: 4  Extension: 4    Tests   Cervical   Negative alar ligament test, Sharp-Dariana test and transverse ligament test      General Comments:      Cervical/Thoracic Comments  Pt demonstrated overall guarding and apprehension with movement throughout but was able to complete as needed     Neuro Exam:     Headaches   Patient reports headaches: No               Precautions: Panic attacks, GERD      Manuals 7/28 IE            STM upper trap/scalene x8                                                   Neuro Re-Ed             Cervical Isometrics                                                                                           Ther Ex             UBE             Chin tucks              Scap Retractions             TB Rows             TB Ext              No money's             SNAGs             UT/[address]                                       Ther Activity                                       Gait Training                                       Modalities             CP PRN             MHP PRN

## 2021-07-28 NOTE — LETTER
2021    Brittany Hernández [address] [address]    Patient: Hiwot Baeza   YOB: 1975   Date of Visit: 2021     Encounter Diagnosis     ICD-10-CM    1  Cervical radiculitis  M54 12 Ambulatory referral to Physical Therapy       Dear Dr Leos Daughters:    Thank you for your recent referral of Hiwot Baeza  Please review the attached evaluation summary from Yasmine's recent visit  Please verify that you agree with the plan of care by signing the attached order  If you have any questions or concerns, please do not hesitate to call  I sincerely appreciate the opportunity to share in the care of one of your patients and hope to have another opportunity to work with you in the near future  Sincerely,    Howard Corey, PT      Referring Provider:      I certify that I have read the below Plan of Care and certify the need for these services furnished under this plan of treatment while under my care  Brittany Hernández DO  [address] [place] [address]  [facility] [place]          PT Evaluation     Today's date: 2021  Patient name: Hiwot Baeza  : 1975  MRN: [id number]  Referring provider: Dave   Dx:   Encounter Diagnosis     ICD-10-CM    1  Cervical radiculitis  M54 12                   Assessment  Assessment details: Pt is a 39 y o  female presenting to outpatient physical therapy with Cervical radiculitis  (primary encounter diagnosis)  Upon eval on this date pt presents with decreased ROM, decreased LUE strength, and increased TTP over L upper trapezius and scalene musculature  Due to deficits listed above as well as significant pain reports, pt has significant impairments with ADLs and functional mobility  Pt had no change in pain reported at end of initial evaluation  Throughout initial evaluation pt was observed to be guarded with movements but was able to complete all to the best of her ability when asked   Pt reported no change in symptoms with cervical distraction but did not increased in discomfort upon release  Pt was educated on importance of continuing to complete neck ROM exercises well as stretching to decrease muscle tension and improve ability to complete ADLs with increased ease and safety  Pt is a good candidate for outpatient physical therapy and would benefit from skilled physical therapy to address limitations and to achieve goals  Thank you for this referral    Impairments: abnormal muscle tone, abnormal or restricted ROM, abnormal movement, activity intolerance, impaired physical strength, lacks appropriate home exercise program, pain with function and poor posture     Symptom irritability: highUnderstanding of Dx/Px/POC: good   Prognosis: good    Goals  ST  Patient will report 25% decrease in pain in 4 weeks  2  Patient will demonstrate 25% improvement in ROM in 4 weeks  3  Patient will report being able to sleep with 25% improvement in 4 weeks  LT  Patient will be able to perform IADLS without restriction or pain by discharge  2  Patient will be independent in HEP by discharge  3  Patient will be able to return to recreational/work duties without restriction or pain by discharge  4  Pt will demonstrate WFL cervical ROM to demonstrate improved ease and safety with ADLs by discharge  5  Pt will report a 75% or greater improvement in ability to sleep without increased neck pain by discharge         Plan  Patient would benefit from: PT eval and skilled PT  Planned modality interventions: cryotherapy, thermotherapy: hydrocollator packs, electrical stimulation/Russian stimulation and TENS  Planned therapy interventions: IADL retraining, body mechanics training, flexibility, functional ROM exercises, home exercise program, neuromuscular re-education, manual therapy, postural training, strengthening, stretching, therapeutic activities, therapeutic exercise, joint mobilization, activity modification, massage and patient education  Frequency: 2x week  Duration in visits: 8  Duration in weeks: 4  Treatment plan discussed with: patient        Subjective Evaluation    History of Present Illness  Mechanism of injury: Pt reports that her neck pain started when she was on a kayak trip at the end of   Pt reports that she really had to dig the paddles to move since the water was so low and the kayak was getting stuck on the ground  Pt reports that she gets stabbing pain on the L side of her neck and at the base of her skull  Pt notes that the pain stops at her shoulder blade  Pt reports that she is limited in day to day activities because of her neck  She notes she is unable to sleep, bend down, look in front of her  Pt reports no dizziness, nausea, double vision, or difficulty swallowing  Pt reports that she currently works as an  and works out of her home  Pt reports that she also has an MRI scheduled for next week,  in the morning  Not a recurrent problem   Pain  Pain scale: 6-7  At best pain ratin (after medication)  At worst pain ratin  Quality: "stabbing in the brain"   Relieving factors: medications, ice and heat  Aggravating factors: overhead activity and lifting (moving neck in general)  Progression: no change    Hand dominance: left      Diagnostic Tests  X-ray: normal (INDICATION:   M54 12: Radiculopathy, cervical region )  Treatments  Previous treatment: medication  Current treatment: medication  Patient Goals  Patient goals for therapy: decreased pain, increased motion, increased strength, independence with ADLs/IADLs and return to sport/leisure activities  Patient goal: "I want my neck to be normal"     "INDICATION:   M54 12: Radiculopathy, cervical region      COMPARISON:  None     VIEWS:  XR SPINE CERVICAL COMPLETE 4 OR 5 VW NON INJURY      FINDINGS:  No fracture  Normal alignment without subluxation    The intervertebral disc spaces are preserved  The neuroforamina are patent  The prevertebral soft tissues are within normal limits  The lung apices are clear      IMPRESSION:  Unremarkable cervical spine"    Objective     Concurrent Complaints  Positive for disturbed sleep  Negative for dizziness, headaches, tinnitus and trouble swallowing    Postural Observations  Seated posture: fair        Palpation   Left   Tenderness of the scalenes, sternocleidomastoid, suboccipitals and upper trapezius  Right   Tenderness of the suboccipitals and upper trapezius  Neurological Testing     Sensation   Cervical/Thoracic   Left   Intact: light touch    Right   Intact: light touch    Active Range of Motion   Cervical/Thoracic Spine       Cervical    Flexion: 15 degrees  with pain  Extension: 25 degrees     with pain  Left lateral flexion: 10 degrees     with pain  Right lateral flexion: 25 ("stabbing pain") degrees     with pain  Left rotation: 35 degrees  Right rotation: 10 degrees    with pain    Joint Play     Pain: C4, C5, C6 and C7     Strength/Myotome Testing     Left Shoulder     Planes of Motion   Flexion: 3+ (painful)   Abduction: 3+   External rotation at 0°: 4   Internal rotation at 0°: 4     Left Elbow   Flexion: 4  Extension: 4    Tests   Cervical   Negative alar ligament test, Sharp-Dariana test and transverse ligament test      General Comments:      Cervical/Thoracic Comments  Pt demonstrated overall guarding and apprehension with movement throughout but was able to complete as needed     Neuro Exam:     Headaches   Patient reports headaches: No               Precautions: Panic attacks, GERD      Manuals 7/28 IE            STM upper trap/scalene x8                                                   Neuro Re-Ed             Cervical Isometrics                                                                                           Ther Ex             UBE             Chin tucks              Scap Retractions             TB Rows TB Ext              No money's             SNAGs             UT/[address]                                       Ther Activity                                       Gait Training                                       Modalities             CP PRN             MHP PRN

## 2021-08-02 ENCOUNTER — OFFICE VISIT (OUTPATIENT)
Dept: PHYSICAL THERAPY | Facility: REHABILITATION | Age: 46
End: 2021-08-02
Payer: COMMERCIAL

## 2021-08-02 ENCOUNTER — TELEPHONE (OUTPATIENT)
Dept: FAMILY MEDICINE CLINIC | Facility: CLINIC | Age: 46
End: 2021-08-02

## 2021-08-02 DIAGNOSIS — M54.12 CERVICAL RADICULITIS: Primary | ICD-10-CM

## 2021-08-02 PROCEDURE — 97110 THERAPEUTIC EXERCISES: CPT

## 2021-08-02 PROCEDURE — 97140 MANUAL THERAPY 1/> REGIONS: CPT

## 2021-08-02 NOTE — PROGRESS NOTES
Daily Note     Today's date: 2021  Patient name: Ayanna Washington  : 1975  MRN: [id number]  Referring provider: Roverto Benitez DO  Dx:   Encounter Diagnosis     ICD-10-CM    1  Cervical radiculitis  M54 12                 Subjective: Pt reports that after her IE she had increased soreness in her neck and felt the "stabbing pain" more at the base of her neck  Pt reports that she took pain medication prior to today's appointment  Objective: See treatment diary below      Assessment: Tolerated treatment fair  Patient demonstrated fatigue post treatment and would benefit from continued PT  Pt noted increased pain in her neck by end of session  Chin tucks were initially attempted in supine but pt was unable to complete due to significant reports of increased pain, however pt was able to later complete chin tucks in seated without the same complaints  Pt was unable to complete self distraction with towel due to reports of increased pain and "stabbing " She was able to complete L rotation snag after significant cueing for form  Plan: Continue per plan of care  Progress treatment as tolerated          Precautions: Panic attacks, GERD                   IE            Manuals             STM upper trap/scalene x8 ML  And suboccipitals                                                  Neuro Re-Ed             Cervical Isometrics  L SB   5" x10                                                                                         Ther Ex             UBE             Chin tucks   3" x10  seated           Scap Retractions  5"   2x10           TB Rows             TB Ext              No money's             SNAGs  L rot 5"x10            UT/[address]                                       Ther Activity                                       Gait Training                                       Modalities             CP PRN             MHP PRN  x10

## 2021-08-03 DIAGNOSIS — M54.12 CERVICAL RADICULITIS: Primary | ICD-10-CM

## 2021-08-03 RX ORDER — METHYLPREDNISOLONE 4 MG/1
TABLET ORAL
Qty: 21 EACH | Refills: 0 | Status: SHIPPED | OUTPATIENT
Start: 2021-08-03 | End: 2021-08-17

## 2021-08-05 ENCOUNTER — OFFICE VISIT (OUTPATIENT)
Dept: PHYSICAL THERAPY | Facility: REHABILITATION | Age: 46
End: 2021-08-05
Payer: COMMERCIAL

## 2021-08-05 DIAGNOSIS — M54.12 CERVICAL RADICULITIS: Primary | ICD-10-CM

## 2021-08-05 PROCEDURE — 97140 MANUAL THERAPY 1/> REGIONS: CPT

## 2021-08-05 PROCEDURE — 97110 THERAPEUTIC EXERCISES: CPT

## 2021-08-05 PROCEDURE — 97530 THERAPEUTIC ACTIVITIES: CPT

## 2021-08-05 NOTE — PROGRESS NOTES
Daily Note     Today's date: 2021  Patient name: Gertrudis Bustillos  : 1975  MRN: [id number]  Referring provider: Margarita Figueredo DO  Dx:   Encounter Diagnosis     ICD-10-CM    1  Cervical radiculitis  M54 12                   Subjective: Pt reports that after last session she felt much worse  She reports that by the time she got home she had a headache and noticed a "bulging" on the left side of her neck as soon as the muscle relaxer wore off  Objective: See treatment diary below      Assessment: Tolerated treatment poor  Pt continues to demonstrate decreased tolerance to functional mobility and activities due to reports of increased pain and discomfort in her neck and top of her L shoulder  Pt was able to complete UBE today without increased pain but was unable to tolerate other exercises  Pt was educated on importance of relaxing her neck and decreasing constant tension being held on her muscles, pt verbalized understanding  A majority of today's session was spent educating patient and answering questions related to symptoms  Patient would benefit from continued PT  Plan: Continue per plan of care  Progress treatment as tolerated         Precautions: Panic attacks, GERD                  IE            Manuals             STM upper trap/scalene x8 ML  And suboccipitals ML                                                 Neuro Re-Ed             Cervical Isometrics  L SB   5" x10                                                                                         Ther Ex             UBE   3' ea          Chin tucks   3" x10  seated           Scap Retractions  5"   2x10           TB Rows             TB Ext              No money's             SNAGs  L rot 5"x10            UT/Scalene St                                       Ther Activity                                       Gait Training                                       Modalities             CP PRN             MHP PRN  x10 x10
None

## 2021-08-06 ENCOUNTER — CONSULT (OUTPATIENT)
Dept: PAIN MEDICINE | Facility: MEDICAL CENTER | Age: 46
End: 2021-08-06
Payer: COMMERCIAL

## 2021-08-06 VITALS
WEIGHT: 149 LBS | TEMPERATURE: 98 F | HEART RATE: 98 BPM | SYSTOLIC BLOOD PRESSURE: 136 MMHG | BODY MASS INDEX: 22.58 KG/M2 | HEIGHT: 68 IN | DIASTOLIC BLOOD PRESSURE: 84 MMHG

## 2021-08-06 DIAGNOSIS — M79.18 MYOFASCIAL PAIN SYNDROME: ICD-10-CM

## 2021-08-06 DIAGNOSIS — M54.2 ACUTE NECK PAIN: Primary | ICD-10-CM

## 2021-08-06 DIAGNOSIS — M62.838 MUSCLE SPASM: ICD-10-CM

## 2021-08-06 DIAGNOSIS — M54.81 OCCIPITAL NEURALGIA OF LEFT SIDE: ICD-10-CM

## 2021-08-06 DIAGNOSIS — M54.12 CERVICAL RADICULITIS: ICD-10-CM

## 2021-08-06 DIAGNOSIS — F33.9 DEPRESSION, RECURRENT (HCC): ICD-10-CM

## 2021-08-06 PROCEDURE — 99204 OFFICE O/P NEW MOD 45 MIN: CPT | Performed by: PHYSICAL MEDICINE & REHABILITATION

## 2021-08-06 PROCEDURE — 3008F BODY MASS INDEX DOCD: CPT | Performed by: FAMILY MEDICINE

## 2021-08-06 RX ORDER — TIZANIDINE 2 MG/1
2 TABLET ORAL EVERY 8 HOURS PRN
Qty: 60 TABLET | Refills: 1 | Status: SHIPPED | OUTPATIENT
Start: 2021-08-06 | End: 2021-08-06

## 2021-08-06 RX ORDER — GABAPENTIN 300 MG/1
300 CAPSULE ORAL 2 TIMES DAILY
Qty: 60 CAPSULE | Refills: 1 | Status: SHIPPED | OUTPATIENT
Start: 2021-08-06 | End: 2021-09-28 | Stop reason: SDUPTHER

## 2021-08-06 NOTE — PROGRESS NOTES
Assessment  1  Acute neck pain    2  Cervical radiculitis    3  Muscle spasm    4  Occipital neuralgia of left side    5  Myofascial pain syndrome    6  Depression, [facility])        Plan  Ms Richelle Ramirez is a pleasant 57-year-old female who presents for initial evaluation regarding neck pain with radiating symptoms into the left upper extremity that started after a recreational kayak event  Since then she has been dealing with worsening neck pain with radiating symptoms behind the left ear up the side of her head and down her left shoulder  During today's evaluation she is demonstrating pain that is likely multifactorial nature with majority of her pain appears to be generating from the cervical paraspinals, occipital nerve and cervical spine  She started in physical therapy 2 weeks ago which is provided minimal relief in her pain  At this time we will   1  Plan for cervical paraspinal trigger point injections followed by cervical occipital nerve blocks under ultrasound guidance  2  Will start the patient on gabapentin 300 mg titrating up to twice a day as tolerated and necessary  3  Advised patient to continue 4-6 weeks of physical therapy and then will consider an MRI of the cervical spine if necessary   4  Complete risks and benefits including bleeding, infection, tissue reaction, nerve injury and allergic reaction were discussed  The approach was demonstrated using models and literature was provided  Verbal and written consent was obtained  My impressions and treatment recommendations were discussed in detail with the patient who verbalized understanding and had no further questions  Discharge instructions were provided  I personally saw and examined the patient and I agree with the above discussed plan of care  No orders of the defined types were placed in this encounter      New Medications Ordered This Visit   Medications    patient supplied medication     Sig: Place on the skin as needed CBD Creme    tiZANidine (ZANAFLEX) 2 mg tablet     Sig: Take 1 tablet (2 mg total) by mouth every 8 (eight) hours as needed for muscle spasms     Dispense:  60 tablet     Refill:  1    gabapentin (NEURONTIN) 300 mg capsule     Sig: Take 1 capsule (300 mg total) by mouth 2 (two) times a day     Dispense:  60 capsule     Refill:  1       History of Present Illness    Rubén Conway is a [address] y o  female presents to [place] [address] [facility] for initial evaluation and referral from Dr Landen Caal regarding 1 months duration of neck pain with radiating symptoms into the right upper extremity  Patient denies any significant inciting event or recent trauma but does report recreational kayaching  Today reports moderate to severe pain rated 7/10 and interfering with daily activities  Pain is nearly constant 60-95% of the time that is present throughout the day and night  Describes symptoms as shooting, numbness, sharp, throbbing pain  Also reports upper and lower extremity weakness as well as dropping objects  Does not use any durable medical equipment for ambulation  Symptoms are worse with lying down, bending, exercise, coughing, sneezing  No significant relief with physical therapy, exercise but does report moderate relief with heat  Currently smokes tobacco half a pack per day for the last year  Over-the-counter NSAIDs including Tylenol and Motrin and provided minimal relief and recently had a Medrol Dosepak which did provide some relief  Presents today for initial evaluation  I have personally reviewed and/or updated the patient's past medical history, past surgical history, family history, social history, current medications, allergies, and vital signs today       Review of Systems    Patient Active Problem List   Diagnosis    Closed avulsion fracture of lateral malleolus of left fibula    Bereavement    Gastroesophageal reflux disease without esophagitis    Tobacco abuse    Panic attack    Current moderate episode of major depressive disorder without prior episode ([facility] [address] )    Well adult exam    Cervical radiculitis    Depression, recurrent ([facility] [address] )       Past Medical History:   Diagnosis Date    Anxiety     Depression     Gallbladder disorder     GERD (gastroesophageal reflux disease)     Menorrhagia     Neck pain on left side     Severe dysmenorrhea        Past Surgical History:   Procedure Laterality Date    CERVICAL BIOPSY  W/ LOOP ELECTRODE EXCISION  2006    CHOLECYSTECTOMY      WISDOM TOOTH EXTRACTION         Family History   Problem Relation Age of Onset    Lung cancer Mother     No Known Problems Father        Social History     Occupational History    Not on file   Tobacco Use    Smoking status: Current Every Day Smoker     Packs/day: 1 00     Types: Cigarettes    Smokeless tobacco: Never Used   Vaping Use    Vaping Use: Former   Substance and Sexual Activity    Alcohol use: Yes     Comment: rarely    Drug use: Not Currently    Sexual activity: Not Currently       Current Outpatient Medications on File Prior to Visit   Medication Sig    ALPRAZolam (XANAX) 0 5 mg tablet Take 1 tablet (0 5 mg total) by mouth 2 (two) times a day as needed for anxiety    calcium carbonate-vitamin D (OSCAL-D) 500 mg-200 units per tablet Take 1 tablet by mouth daily    desvenlafaxine succinate (PRISTIQ) 100 mg 24 hr tablet Take 1 tablet (100 mg total) by mouth daily Genic brand only take 1 tab by mouth daily      diphenhydrAMINE (BENADRYL) 50 mg capsule Take 50 mg by mouth daily as needed    methocarbamol (ROBAXIN) 500 mg tablet Take 1 tablet (500 mg total) by mouth 4 (four) times a day    methylPREDNISolone (MEDROL, CARLA, PO) Take by mouth    omeprazole (PriLOSEC) 40 MG capsule Take 40 mg by mouth 2 (two) times a day    patient supplied medication Place on the skin as needed CBD Creme    Probiotic Product (PROBIOTIC-10 PO) Take by mouth    zolpidem (AMBIEN) 10 mg tablet Take 1 tablet (10 mg total) by mouth daily at bedtime as needed for sleep    bupivacaine (MARCAINE) 0 5 % 3 mL (Patient not taking: Reported on 7/21/2021)    desvenlafaxine succinate (PRISTIQ) 50 mg 24 hr tablet Take 50 mg by mouth daily (Patient not taking: Reported on 7/28/2021)    diphenhydrAMINE (BENADRYL) 50 MG tablet Take 50 mg by mouth daily as needed (Patient not taking: Reported on 8/6/2021)    ketorolac (TORADOL) 10 mg tablet Take 1 tablet (10 mg total) by mouth every 6 (six) hours as needed for moderate pain (Patient not taking: Reported on 8/6/2021)    methylPREDNISolone 4 MG tablet therapy pack Use as directed on package (Patient not taking: Reported on 8/6/2021)     No current facility-administered medications on file prior to visit  Allergies   Allergen Reactions    Bee Pollen     Bee Venom     Cat Hair Extract     Diclofenac      Action Taken: FLU LIKE SYMPTOMS ; BODY ACHES; Category: Allergy; Annotation - [id number]: FLU LIKE SYMPTOMS ; BODY ACHES    Dog Epithelium     Dog Epithelium Allergy Skin Test     Dust Mite Extract     Grass Extracts  [Gramineae Pollens]     Other      Annotation - [id number]: Root Beer , Coconut    Pollen Extract     Root Beer Flavor        Physical Exam    /84   Pulse 98   Temp 98 °F (36 7 °C)   Ht 5' 8" (1 727 m)   Wt 67 6 kg (149 lb)   BMI 22 66 kg/m²     Constitutional: normal, well developed, well nourished, alert, in no distress and non-toxic and no overt pain behavior    Eyes: anicteric  HEENT: grossly intact  Neck: supple, symmetric, trachea midline and no masses   Pulmonary:even and unlabored  Cardiovascular:No edema or pitting edema present  Skin:Normal without rashes or lesions and well hydrated  Psychiatric:Mood and affect appropriate  Neurologic:Cranial Nerves II-XII grossly intact  Musculoskeletal:normal gait, tenderness to palpation left-sided cervical paraspinals, decreased active and passive range of motion cervical flexion and extension as well as left-sided rotation and side-bending limited by pain, MMT 5/5 bilateral upper extremities, sensation grossly intact to light touch, positive Tinel's left occiput with radiating symptoms being under left ear, DTRs within normal limits    Imaging  XR spine cervical complete 4 or 5 vw non injury: Result Notes   Megan Christine   7/22/2021  1:19 PM EDT       Ordered test and called pt    Smitha Anna,    7/22/2021 12:28 PM EDT       Sarath Leon TO ORDER MRI OF THE CERVICAL SPINE WITHOUT CONTRAST    Megan Christine   7/22/2021 12:26 PM EDT       Spoke to pt and she understands xray results  Pt wants to have an MRI   She said you discussed this with her  Should we order this    Smitha Castillo,    7/22/2021 12:22 PM EDT        CALL PATIENT   X-RAY OF THE CERVICAL SPINE IS NORMAL           Study Result    Narrative & Impression   CERVICAL SPINE     INDICATION:   M54 12:  Radiculopathy, cervical region      COMPARISON:  None     VIEWS:  XR SPINE CERVICAL COMPLETE 4 OR 5 VW NON INJURY         FINDINGS:     No fracture       Normal alignment without subluxation      The intervertebral disc spaces are preserved       The neuroforamina are patent      The prevertebral soft tissues are within normal limits        The lung apices are clear      IMPRESSION:     Unremarkable cervical spine         Workstation performed: [id number]

## 2021-08-06 NOTE — PATIENT INSTRUCTIONS
Cervical Radiculopathy   WHAT YOU NEED TO KNOW:   What is cervical radiculopathy? Cervical radiculopathy is a painful condition that happens when a spinal nerve in your neck is pinched or irritated  What causes cervical radiculopathy? Changes in the vertebrae (bones) and discs in your neck can put pressure on a spinal nerve  Discs are natural, spongy cushions between your vertebrae that allow your spine to move  The following can cause a pinched nerve:  · Disc damage  may occur if a disc flattens, bulges, or moves over time  An injury can also cause disc damage  · Cervical spondylosis  is when the vertebrae in your neck break down  This normally occurs as you age  · Growths , such as tumors or cysts (fluid-filled lumps), may grow and press on the nerve  What are the signs and symptoms of cervical radiculopathy? The most common symptom is sharp pain that travels from your neck all the way down your arm  You may have pain in your shoulder, chest, and hand  The pain may get worse with movement or when you cough or sneeze  You may also have any of the following:  · Burning or tingling sensations in your neck or arm     · Numbness or weakness in your arm or hand that makes it hard for you to  objects    · Headaches    How is cervical radiculopathy diagnosed? Your healthcare provider will ask when and how your symptoms began  He will gently press on your neck to check for tenderness and areas that are not shaped correctly  He will also check your arms and hands for numbness or weakness  You may have any of the following:  · Provocative tests  are done to check your response to certain movements  He will ask you to move your neck, shoulder, and arm in different ways  Some movements will increase your symptoms, while others will make you feel better  · An x-ray  is a picture of the bones and tissues in your neck  · An MRI or a CT scan  may be used to take pictures of your neck   The pictures can show problems and changes in your nerves, discs, and vertebrae  You may be given dye to help the pictures show up better  Tell the healthcare provider if you have ever had an allergic reaction to contrast dye  Do not enter the MRI room with anything metal  Metal can cause serious injury  Tell the healthcare provider if you have any metal in or on your body  · An electromyography  is also called an EMG  An EMG is done to test the function of your muscles and the nerves that control them  Electrodes (wires) are placed on the muscle being tested  Needles may be attached to the electrodes and placed in your skin  The electrical activity of your muscles and nerves is measured by a machine attached to the electrodes  Your muscles are tested at rest and during movement  How is cervical radiculopathy treated? · NSAIDs  decrease swelling and pain  This medicine can be bought without a doctor's order  This medicine can cause stomach bleeding or kidney problems in certain people  If you take blood thinner medicine, always ask your healthcare provider if NSAIDs are safe for you  Always read the medicine label and follow the directions on it before using this medicine  · Prescription pain medicine  may be given to decrease pain  Do not wait until the pain is severe before you take this medicine  · Steroids  help decrease pain and swelling  These may be given as a pill or as an injection in your neck  You may need more than 1 injection if your symptoms do not improve after the first treatment  · Physical therapy  helps stretch and strengthen your muscles  Your physical therapist can teach you how to improve your posture and the way you hold your neck  He may also teach you how to be safely active and avoid further injury  He can also help you develop an exercise program that is safe for your back and neck       · Surgery  may be used to treat a pinched nerve if other treatments have not helped after 6 to 12 weeks  How can I manage my symptoms? · Ice  helps decrease swelling and pain  Ice may also help prevent tissue damage  Use an ice pack, or put crushed ice in a plastic bag  Cover it with a towel and place it on your neck for 15 to 20 minutes every hour or as directed  · Rest  when you feel it is needed  Slowly start to do more each day  Return to your daily activities as directed  · Wear a soft collar  You may be given a soft collar to support your neck while you sleep  Wear the soft collar only as directed  · Do light stretches and regular exercise  Your healthcare provider may suggest light stretches to help decrease stiffness in your neck and arm as you recover  After your pain is controlled, you may benefit from regular exercise  Ask what type of exercise is safe for your back and neck  · Review your work area  A comfortable work area can help prevent neck strain  Ask your employer for an ergonomic review to check the position of your desk, chair, phone, and computer  Make any necessary adjustments for your comfort  When should I contact my healthcare provider? · You are losing weight without trying  · Your pain is worse, even with medicine  · One or both hands feel more numb than before, or you cannot move your fingers well  · You have questions or concerns about your condition or care  CARE AGREEMENT:   You have the right to help plan your care  Learn about your health condition and how it may be treated  Discuss treatment options with your healthcare providers to decide what care you want to receive  You always have the right to refuse treatment  The above information is an  only  It is not intended as medical advice for individual conditions or treatments  Talk to your doctor, nurse or pharmacist before following any medical regimen to see if it is safe and effective for you    © Copyright [employer] 2021 Information is for End User's use only and may not be sold, redistributed or otherwise used for commercial purposes   All illustrations and images included in CareNotes® are the copyrighted property of A D A M , Inc  or [facility] Richar Lindsay

## 2021-08-09 ENCOUNTER — APPOINTMENT (OUTPATIENT)
Dept: PHYSICAL THERAPY | Facility: REHABILITATION | Age: 46
End: 2021-08-09
Payer: COMMERCIAL

## 2021-08-10 ENCOUNTER — TELEPHONE (OUTPATIENT)
Dept: FAMILY MEDICINE CLINIC | Facility: CLINIC | Age: 46
End: 2021-08-10

## 2021-08-10 NOTE — TELEPHONE ENCOUNTER
Pt called in and would like a refill but needs it prior [facility] Initial (On Arrival)

## 2021-08-11 ENCOUNTER — PROCEDURE VISIT (OUTPATIENT)
Dept: PAIN MEDICINE | Facility: MEDICAL CENTER | Age: 46
End: 2021-08-11
Payer: COMMERCIAL

## 2021-08-11 DIAGNOSIS — M79.18 MYOFASCIAL PAIN SYNDROME: Primary | ICD-10-CM

## 2021-08-11 PROCEDURE — 76942 ECHO GUIDE FOR BIOPSY: CPT | Performed by: PHYSICAL MEDICINE & REHABILITATION

## 2021-08-11 PROCEDURE — 20552 NJX 1/MLT TRIGGER POINT 1/2: CPT | Performed by: PHYSICAL MEDICINE & REHABILITATION

## 2021-08-11 RX ORDER — METHYLPREDNISOLONE ACETATE 40 MG/ML
40 INJECTION, SUSPENSION INTRA-ARTICULAR; INTRALESIONAL; INTRAMUSCULAR; SOFT TISSUE ONCE
Status: COMPLETED | OUTPATIENT
Start: 2021-08-11 | End: 2021-08-11

## 2021-08-11 RX ORDER — BUPIVACAINE HYDROCHLORIDE 2.5 MG/ML
10 INJECTION, SOLUTION EPIDURAL; INFILTRATION; INTRACAUDAL ONCE
Status: CANCELLED | OUTPATIENT
Start: 2021-08-11

## 2021-08-11 RX ORDER — BUPIVACAINE HYDROCHLORIDE 2.5 MG/ML
10 INJECTION, SOLUTION EPIDURAL; INFILTRATION; INTRACAUDAL ONCE
Status: COMPLETED | OUTPATIENT
Start: 2021-08-11 | End: 2021-08-11

## 2021-08-11 RX ORDER — METHYLPREDNISOLONE ACETATE 40 MG/ML
40 INJECTION, SUSPENSION INTRA-ARTICULAR; INTRALESIONAL; INTRAMUSCULAR; SOFT TISSUE ONCE
Status: CANCELLED | OUTPATIENT
Start: 2021-08-11

## 2021-08-11 RX ADMIN — METHYLPREDNISOLONE ACETATE 40 MG: 40 INJECTION, SUSPENSION INTRA-ARTICULAR; INTRALESIONAL; INTRAMUSCULAR; SOFT TISSUE at 15:31

## 2021-08-11 RX ADMIN — BUPIVACAINE HYDROCHLORIDE 10 ML: 2.5 INJECTION, SOLUTION EPIDURAL; INFILTRATION; INTRACAUDAL at 15:30

## 2021-08-11 NOTE — PROGRESS NOTES
Indication:  Muscular pain  Preprocedure diagnosis:  1  Myofascial pain syndrome  Postprocedure diagnosis:  1  Myofascial pain syndrome    Procedure:  Ultrasound-guided cervical paraspinal muscle trigger point injection(s)  After discussing the risks, benefits, and alternatives to the procedure, the patient expressed understanding and wished to proceed  The patient was brought to the procedure suite and placed in the prone position  A procedural pause was conducted to verify:  correct patient identity, procedure to be performed and as applicable, correct side and site, correct patient position, and availability of implants, special equipment or special requirements  A simple surgical tray was used  Prior to the procedure, the cervical paraspinals were examined with a 12 MHz linear transducer to visualize the targeted trigger points and determine the optimal needle path  Following this, the area was prepared with a ChloraPrep scrub, then re-examined using the same transducer, a sterile ultrasound transducer cover, and sterile ultrasound transducer gel  Thereafter, using ultrasound guidance, a 2 5-inch 25-gauge needle was advanced into the targeted trigger point  After visualization of the tip and negative aspiration for blood, a mixture of 40 mg Depo-Medrol with 0 25% bupivacaine was injected into the targeted trigger point  Following the injection, the needle was withdrawn  The patient tolerated the procedure well and there were no apparent complications  After an appropriate amount of observation, the patient was dismissed from the clinic in good condition under their own power

## 2021-08-12 ENCOUNTER — OFFICE VISIT (OUTPATIENT)
Dept: PHYSICAL THERAPY | Facility: REHABILITATION | Age: 46
End: 2021-08-12
Payer: COMMERCIAL

## 2021-08-12 DIAGNOSIS — M54.12 CERVICAL RADICULITIS: Primary | ICD-10-CM

## 2021-08-12 PROCEDURE — 97110 THERAPEUTIC EXERCISES: CPT

## 2021-08-12 PROCEDURE — 97140 MANUAL THERAPY 1/> REGIONS: CPT

## 2021-08-12 NOTE — PROGRESS NOTES
Daily Note     Today's date: 2021  Patient name: Ruddy Garduno  : 1975  MRN: [id number]  Referring provider: Luis Angel Fitch DO  Dx:   Encounter Diagnosis     ICD-10-CM    1  Cervical radiculitis  M54 12                   Subjective: Pt reports that she got steroid injections yesterday and since then her neck pain has felt very tolerable  Pt reports that she also had trouble with GERD the past few days  Current pain: 4   Pain at end of session: 3       Objective: See treatment diary below      Assessment: Tolerated treatment well  Pt tolerated all exercises well today with no reports of increased pain or "stabbing" in her neck  Pt was also able to demonstrate increased relaxation during manual therapy  At end of session pt was also observed to be able to turn and look at therapist without having to turn her entire body demonstrating improve neck ROM  Patient would benefit from continued PT  Plan: Continue per plan of care  Progress treatment as tolerated         Precautions: Panic attacks, GERD                 IE            Manuals             STM upper trap/scalene x8 ML  And suboccipitals ML ML                                                Neuro Re-Ed             Cervical Isometrics  L SB   5" x10                                                                                         Ther Ex             UBE   3' ea 3'/3'         Chin tucks   3" x10  seated           Scap Retractions  5"   2x10  5"  2x10         TB Rows             TB Ext              No money's             SNAGs  L rot 5"x10            UT/[address]                 Cervical ROM  5"x5 ea                      Ther Activity                                       Gait Training                                       Modalities             CP PRN             MHP PRN  x10 x10 x10 pre

## 2021-08-13 DIAGNOSIS — F41.0 PANIC ATTACK: ICD-10-CM

## 2021-08-13 DIAGNOSIS — F51.01 PRIMARY INSOMNIA: ICD-10-CM

## 2021-08-13 RX ORDER — ZOLPIDEM TARTRATE 10 MG/1
10 TABLET ORAL
Qty: 30 TABLET | Refills: 0 | Status: SHIPPED | OUTPATIENT
Start: 2021-08-13 | End: 2021-09-10 | Stop reason: SDUPTHER

## 2021-08-13 RX ORDER — ALPRAZOLAM 0.5 MG/1
0.5 TABLET ORAL 2 TIMES DAILY PRN
Qty: 60 TABLET | Refills: 0 | Status: SHIPPED | OUTPATIENT
Start: 2021-08-13 | End: 2021-09-10 | Stop reason: SDUPTHER

## 2021-08-13 NOTE — TELEPHONE ENCOUNTER
Desvenlafaxine Succinate (PRISTIQ) 100 mg 24 hr tablet     Patient called again and wanted to know what was the status of her medication request for the above  I told patient I did not get the message of this week and will work on it ASAP  I asked Damien Elliott to help with this issue via e-mail today

## 2021-08-16 ENCOUNTER — OFFICE VISIT (OUTPATIENT)
Dept: PHYSICAL THERAPY | Facility: REHABILITATION | Age: 46
End: 2021-08-16
Payer: COMMERCIAL

## 2021-08-16 DIAGNOSIS — M54.12 CERVICAL RADICULITIS: Primary | ICD-10-CM

## 2021-08-16 PROCEDURE — 97140 MANUAL THERAPY 1/> REGIONS: CPT

## 2021-08-16 PROCEDURE — 97110 THERAPEUTIC EXERCISES: CPT

## 2021-08-16 NOTE — PROGRESS NOTES
Daily Note     Today's date: 2021  Patient name: Wilber Moore  : 1975  MRN: [id number]  Referring provider: Karli Marc DO  Dx:   Encounter Diagnosis     ICD-10-CM    1  Cervical radiculitis  M54 12                    Subjective: Pt reports that after last session she felt okay and had no pain or issue  However, she notes that she tried to do laundry over the weekend and the repetitive bending made the stabbing come back in her neck and it has been back since  Objective: See treatment diary below      Assessment: Tolerated treatment well  Pt was able to tolerate all exercises without reports of increased pain  Pt required cueing throughout to decrease speed of movements for proper hold times, with good carryover noted after correction  Pt was able Patient demonstrated fatigue post treatment, exhibited good technique with therapeutic exercises and would benefit from continued PT  Plan: Continue per plan of care  Progress treatment as tolerated         Precautions: Panic attacks, GERD                IE            Manuals             STM upper trap/scalene x8 ML  And suboccipitals ML ML ML and suboccciptals                                               Neuro Re-Ed             Cervical Isometrics  L SB   5" x10                                                                                         Ther Ex             UBE   3' ea 3'/3' 4'/4'        Chin tucks   3" x10  seated   3" x10         Scap Retractions  5"   2x10  5"  2x10 5"  2x10        TB Rows             TB Ext              No money's             SNAGs  L rot 5"x10    Subocciptial release with towel  5x3"        UT/Scalene St      L side   3x15"            Cervical ROM  5"x5 ea                      Ther Activity                                       Gait Training                                       Modalities             CP PRN             MHP PRN  x10 x10 x10 pre x10 pre

## 2021-08-17 ENCOUNTER — TELEPHONE (OUTPATIENT)
Dept: FAMILY MEDICINE CLINIC | Facility: CLINIC | Age: 46
End: 2021-08-17

## 2021-08-17 ENCOUNTER — TELEPHONE (OUTPATIENT)
Dept: PAIN MEDICINE | Facility: CLINIC | Age: 46
End: 2021-08-17

## 2021-08-17 ENCOUNTER — OFFICE VISIT (OUTPATIENT)
Dept: FAMILY MEDICINE CLINIC | Facility: CLINIC | Age: 46
End: 2021-08-17
Payer: COMMERCIAL

## 2021-08-17 VITALS
DIASTOLIC BLOOD PRESSURE: 80 MMHG | BODY MASS INDEX: 23.28 KG/M2 | TEMPERATURE: 96.4 F | SYSTOLIC BLOOD PRESSURE: 140 MMHG | WEIGHT: 153.6 LBS | HEIGHT: 68 IN

## 2021-08-17 DIAGNOSIS — M54.2 CERVICAL PAIN: ICD-10-CM

## 2021-08-17 DIAGNOSIS — F33.9 DEPRESSION, RECURRENT (HCC): Primary | ICD-10-CM

## 2021-08-17 PROCEDURE — 99213 OFFICE O/P EST LOW 20 MIN: CPT | Performed by: FAMILY MEDICINE

## 2021-08-17 PROCEDURE — 4004F PT TOBACCO SCREEN RCVD TLK: CPT | Performed by: FAMILY MEDICINE

## 2021-08-17 PROCEDURE — 3008F BODY MASS INDEX DOCD: CPT | Performed by: FAMILY MEDICINE

## 2021-08-17 RX ORDER — CELECOXIB 200 MG/1
200 CAPSULE ORAL DAILY
Qty: 30 CAPSULE | Refills: 2 | Status: SHIPPED | OUTPATIENT
Start: 2021-08-17 | End: 2021-09-01 | Stop reason: SDUPTHER

## 2021-08-17 NOTE — TELEPHONE ENCOUNTER
Call pharmacy of choice and they will process 30 tabs for $15 00 which will last the patient for a month until we get her PA approved for the next 90 days  Called patient this evening and stated the above

## 2021-08-17 NOTE — TELEPHONE ENCOUNTER
Pt called in let the doctor know that she has had a pain in neck down to her shoulder   Is there a injection for her skull downward with the nerve   Please be advised thank you    Pt can be reached @ [phone]

## 2021-08-17 NOTE — PROGRESS NOTES
Well-Baby Checkup: Up to 1 Month     Its fine to take the baby out. Avoid prolonged sun exposure and crowds where germs can spread.     After your first  visit, your baby will likely have a checkup within his or her first month of life. At this checkup, the healthcare provider will examine the baby and ask how things are going at home. This sheet describes some of what you can expect.  Development and milestones  The healthcare provider will ask questions about your baby. He or she will observe the baby to get an idea of the infants development. By this visit, your baby is likely doing some of the following:  · Smiling for no apparent reason (called a spontaneous smile)  · Making eye contact, especially during feeding  · Making random sounds (also called vocalizing)  · Trying to lift his or her head  · Wiggling and squirming. Each arm and leg should move about the same amount. If not, tell the healthcare provider.  · Becoming startled when hearing a loud noise  Feeding tips  At around 2 weeks of age, your baby should be back to his or her birth weight. Continue to feed your baby either breastmilk or formula. To help your baby eat well:  · During the day, feed at least every 2 to 3 hours. You may need to wake the baby for daytime feedings.  · At night, feed when the baby wakes, often every 3 to 4 hours. You may choose not to wake the baby for nighttime feedings. Discuss this with the healthcare provider.  · Breastfeeding sessions should last around 15 to 20 minutes. With a bottle, lowly increase the amount of formula or breastmilk you give your baby. By 1 month of age, most babies eat about 4 ounces per feeding, but this can vary.  · If youre concerned about how much or how often your baby eats, discuss this with the healthcare provider.  · Ask the healthcare provider if your baby should take vitamin D.  · Don't give the baby anything to eat besides breastmilk or formula. Your baby is too young for  Assessment/Plan:  Cervical pain slowly improving  Better range of motion  Patient follow-up with pain management  Patient going for occipital nerve block  Anxiety and depression improving with Pristiq  Patient will try Celebrex for cervical pain  Follow-up in 3 months  Diagnoses and all orders for this visit:    Depression, recurrent ([address] )    Cervical pain  -     celecoxib (CeleBREX) 200 mg capsule; Take 1 capsule (200 mg total) by mouth daily            Subjective:        Patient ID: Rajesh Mott is a 39 y o  female  Patient is here to follow-up on anxiety and depression  Patient doing better from anxiety and depressive standpoint with Pristiq 100 mg daily  Patient does need prior authorization Pristiq  Patient's field Paxil, Effexor, Cymbalta, Lexapro, Wellbutrin, Prozac, Zoloft in the past   Cervical pain 3-10 at rest   Patient has significant pain with doing dishes  Patient status post GERD issues  The following portions of the patient's history were reviewed and updated as appropriate: allergies, current medications, past family history, past medical history, past social history, past surgical history and problem list       Review of Systems   Constitutional: Negative  HENT: Negative  Eyes: Negative  Respiratory: Negative  Cardiovascular: Negative  Gastrointestinal: Negative  Endocrine: Negative  Genitourinary: Negative  Musculoskeletal: Positive for neck pain  Skin: Negative  Allergic/Immunologic: Negative  Neurological: Negative  Hematological: Negative  Psychiatric/Behavioral: Negative  Objective: Tobacco Cessation Counseling: Tobacco cessation counseling was provided   The patient is sincerely urged to quit consumption of tobacco  She is not ready to quit tobacco            /80 (BP Location: Right arm, Patient Position: Sitting, Cuff Size: Standard)   Temp (!) 96 4 °F (35 8 °C) (Tympanic)   Ht 5' 8" (1 727 m)   Wt 69 7 kg (153 lb 9 6 oz)   BMI 23 35 kg/m²          Physical Exam  Vitals and nursing note reviewed  Constitutional:       General: She is not in acute distress  Appearance: Normal appearance  She is not ill-appearing, toxic-appearing or diaphoretic  HENT:      Head: Normocephalic and atraumatic  Right Ear: Tympanic membrane, ear canal and external ear normal  There is no impacted cerumen  Left Ear: Tympanic membrane, ear canal and external ear normal  There is no impacted cerumen  Nose: Nose normal  No congestion or rhinorrhea  Eyes:      General: No scleral icterus  Right eye: No discharge  Left eye: No discharge  Extraocular Movements: Extraocular movements intact  Conjunctiva/sclera: Conjunctivae normal       Pupils: Pupils are equal, round, and reactive to light  Neck:      Vascular: No carotid bruit  Comments: Paravertebral muscle spasm and pain left cervical region greater than right  Cardiovascular:      Rate and Rhythm: Normal rate and regular rhythm  Pulses: Normal pulses  Heart sounds: Normal heart sounds  No murmur heard  No friction rub  No gallop  Pulmonary:      Effort: Pulmonary effort is normal  No respiratory distress  Breath sounds: Normal breath sounds  No stridor  No wheezing, rhonchi or rales  Chest:      Chest wall: No tenderness  Musculoskeletal:         General: Tenderness present  No swelling, deformity or signs of injury  Cervical back: Normal range of motion and neck supple  Tenderness present  No rigidity  No muscular tenderness  Right lower leg: No edema  Left lower leg: No edema  Lymphadenopathy:      Cervical: No cervical adenopathy  Skin:     General: Skin is warm and dry  Capillary Refill: Capillary refill takes less than 2 seconds  Coloration: Skin is not jaundiced  Findings: No bruising, erythema, lesion or rash     Neurological:      Mental Status: She is alert and solid foods (solids) or other liquids. An infant this age does not need to be given water.  · Be aware that many babies begin to spit up around 1 month of age. In most cases, this is normal. Call the healthcare provider right away if the baby spits up often and forcefully, or spits up anything besides milk or formula.  Hygiene tips  · Some babies poop (have a bowel movement) a few times a day. Others poop as little as once every 2 to 3 days. Anything in this range is normal. Change the babys diaper when it becomes wet or dirty.  · Its fine if your baby poops even less often than every 2 to 3 days if the baby is otherwise healthy. But if the baby also becomes fussy, spits up more than normal, eats less than normal, or has very hard stool, tell the healthcare provider. The baby may be constipated (unable to have a bowel movement).  · Stool may range in color from mustard yellow to brown to green. If the stools are another color, tell the healthcare provider.  · Bathe your baby a few times per week. You may give baths more often if the baby enjoys it. But because youre cleaning the baby during diaper changes, a daily bath often isnt needed.  · Its OK to use mild (hypoallergenic) creams or lotions on the babys skin. Avoid putting lotion on the babys hands.  Sleeping tips  At this age, your baby may sleep up to 18 to 20 hours each day. Its common for babies to sleep for short spurts throughout the day, rather than for hours at a time. The baby may be fussy before going to bed for the night (around 6 p.m. to 9 p.m.). This is normal. To help your baby sleep safely and soundly:  · Put your baby on his or her back for naps and sleeping until your child is 1 year old. This can lower the risk for SIDS, aspiration, and choking. Never put your baby on his or her side or stomach for sleep or naps. When your baby is awake, let your child spend time on his or her tummy as long as you are watching your child. This helps  oriented to person, place, and time  Mental status is at baseline  Cranial Nerves: No cranial nerve deficit  Sensory: No sensory deficit  Motor: No weakness  Coordination: Coordination normal       Gait: Gait normal    Psychiatric:         Mood and Affect: Mood normal          Behavior: Behavior normal          Thought Content:  Thought content normal          Judgment: Judgment normal  your child build strong tummy and neck muscles. This will also help keep your baby's head from flattening. This problem can happen when babies spend so much time on their back.  · Ask the healthcare provider if you should let your baby sleep with a pacifier. Sleeping with a pacifier has been shown to decrease the risk for SIDS. But it should not be offered until after breastfeeding has been established. If your baby doesn't want the pacifier, don't try to force him or her to take one.  · Don't put a crib bumper, pillow, loose blankets, or stuffed animals in the crib. These could suffocate the baby.  · Don't put your baby on a couch or armchair for sleep. Sleeping on a couch or armchair puts the baby at a much higher risk for death, including SIDS.  · Don't use infant seats, car seats, strollers, infant carriers, or infant swings for routine sleep and daily naps. These may cause a baby's airway to become blocked or the baby to suffocate.  · Swaddling (wrapping the baby in a blanket) can help the baby feel safe and fall asleep. Make sure your baby can easily move his or her legs.  · Its OK to put the baby to bed awake. Its also OK to let the baby cry in bed, but only for a few minutes. At this age, babies arent ready to cry themselves to sleep.  · If you have trouble getting your baby to sleep, ask the health care provider for tips.  · Don't share a bed (co-sleep) with your baby. Bed-sharing has been shown to increase the risk for SIDS. The American Academy of Pediatrics says that babies should sleep in the same room as their parents. They should be close to their parents' bed, but in a separate bed or crib. This sleeping setup should be done for the baby's first year, if possible. But you should do it for at least the first 6 months.  · Always put cribs, bassinets, and play yards in areas with no hazards. This means no dangling cords, wires, or window coverings. This will lower the risk for  strangulation.  · Don't use baby heart rate and monitors or special devices to help lower the risk for SIDS. These devices include wedges, positioners, and special mattresses. These devices have not been shown to prevent SIDS. In rare cases, they have caused the death of a baby.  · Talk with your baby's healthcare provider about these and other health and safety issues.  Safety tips  · To avoid burns, dont carry or drink hot liquids, such as coffee, near the baby. Turn the water heater down to a temperature of 120°F (49°C) or below.  · Dont smoke or allow others to smoke near the baby. If you or other family members smoke, do so outdoors while wearing a jacket, and then remove the jacket before holding the baby. Never smoke around the baby  · Its usually fine to take a  out of the house. But stay away from confined, crowded places where germs can spread.  · When you take the baby outside, don't stay too long in direct sunlight. Keep the baby covered, or seek out the shade.   · In the car, always put the baby in a rear-facing car seat. This should be secured in the back seat according to the car seats directions. Never leave the baby alone in the car.  · Don't leave the baby on a high surface such as a table, bed, or couch. He or she could fall and get hurt.  · Older siblings will likely want to hold, play with, and get to know the baby. This is fine as long as an adult supervises.  · Call the healthcare provider right away if the baby has a fever (see Fever and children, below).  Vaccines  Based on recommendations from the CDC, your baby may get the hepatitis B vaccine if he or she did not already get it in the hospital after birth. Having your baby fully vaccinated will also help lower your baby's risk for SIDS.        Fever and children  Always use a digital thermometer to check your childs temperature. Never use a mercury thermometer.  For infants and toddlers, be sure to use a rectal thermometer  correctly. A rectal thermometer may accidentally poke a hole in (perforate) the rectum. It may also pass on germs from the stool. Always follow the product makers directions for proper use. If you dont feel comfortable taking a rectal temperature, use another method. When you talk to your childs healthcare provider, tell him or her which method you used to take your childs temperature.  Here are guidelines for fever temperature. Ear temperatures arent accurate before 6 months of age. Dont take an oral temperature until your child is at least 4 years old.  Infant under 3 months old:  · Ask your childs healthcare provider how you should take the temperature.  · Rectal or forehead (temporal artery) temperature of 100.4°F (38°C) or higher, or as directed by the provider  · Armpit temperature of 99°F (37.2°C) or higher, or as directed by the provider      Signs of postpartum depression  Its normal to be weepy and tired right after having a baby. These feelings should go away in about a week. If youre still feeling this way, it may be a sign of postpartum depression, a more serious problem. Symptoms may include:  · Feelings of deep sadness  · Gaining or losing a lot of weight  · Sleeping too much or too little  · Feeling tired all the time  · Feeling restless  · Feeling worthless or guilty  · Fearing that your baby will be harmed  · Worrying that youre a bad parent  · Having trouble thinking clearly or making decisions  · Thinking about death or suicide  If you have any of these symptoms, talk to your OB/GYN or another healthcare provider. Treatment can help you feel better.     Next checkup at: _______________________________     PARENT NOTES:           Date Last Reviewed: 11/1/2016  © 6983-4915 [employer]. [address] [place] [address], [place] [address]. All rights reserved. This information is not intended as a substitute for professional medical care. Always follow your healthcare professional's  instructions.

## 2021-08-18 NOTE — TELEPHONE ENCOUNTER
RN attempted to reach pt regarding previous  A detailed VMMLOM as per TAY  With c/b number office hours and location provided if wants to c/b to make an appt to be evaluated in an office visit  --If pt calls back please assist in making the appt for the pt

## 2021-08-19 ENCOUNTER — OFFICE VISIT (OUTPATIENT)
Dept: PHYSICAL THERAPY | Facility: REHABILITATION | Age: 46
End: 2021-08-19
Payer: COMMERCIAL

## 2021-08-19 DIAGNOSIS — M54.12 CERVICAL RADICULITIS: Primary | ICD-10-CM

## 2021-08-19 PROCEDURE — 97140 MANUAL THERAPY 1/> REGIONS: CPT

## 2021-08-19 PROCEDURE — 97110 THERAPEUTIC EXERCISES: CPT

## 2021-08-19 NOTE — PROGRESS NOTES
Daily Note     Today's date: 2021  Patient name: Tosin Jones  : 1975  MRN: [id number]  Referring provider: Juan Lal DO  Dx:   Encounter Diagnosis     ICD-10-CM    1  Cervical radiculitis  M54 12                   Subjective: Pt reports that she is frustrated that the stabbing is still bothering her in the neck  She noted some muscle soreness after last session  She reports that her doctor put her on celebrex recently and that she has a nerve block injection scheduled for next   Objective: See treatment diary below      Assessment: Tolerated treatment well  Pt reported no increase or change in pain throughout session  Pt did well with addition of SCM stretch into POC  Pt was also educated to incorporate it into her HEP  Patient demonstrated fatigue post treatment and would benefit from continued PT  Plan: Continue per plan of care  Progress treatment as tolerated         Precautions: Panic attacks, GERD               IE            Manuals             STM upper trap/scalene x8 ML  And suboccipitals ML ML ML and suboccciptals ML and suboccciptals                                              Neuro Re-Ed             Cervical Isometrics  L SB   5" x10                                                                                         Ther Ex             UBE   3' ea 3'/3' 4'/4' 4'/4'        Chin tucks   3" x10  seated   3" x10         Scap Retractions  5"   2x10  5"  2x10 5"  2x10 5"  2x10       TB Rows             TB Ext              No money's             SNAGs  L rot 5"x10    Subocciptial release with towel  5x3"        UT/Scalene St      L side   3x15" L   3x15"            Cervical ROM  5"x5 ea               L SCM stretch  3x15"        Ther Activity                                       Gait Training                                       Modalities             CP PRN             MHP PRN  x10 x10 x10 pre x10 pre x10 pre

## 2021-08-23 ENCOUNTER — OFFICE VISIT (OUTPATIENT)
Dept: PHYSICAL THERAPY | Facility: REHABILITATION | Age: 46
End: 2021-08-23
Payer: COMMERCIAL

## 2021-08-23 DIAGNOSIS — M54.12 CERVICAL RADICULITIS: Primary | ICD-10-CM

## 2021-08-23 PROCEDURE — 97140 MANUAL THERAPY 1/> REGIONS: CPT

## 2021-08-23 PROCEDURE — 97110 THERAPEUTIC EXERCISES: CPT

## 2021-08-23 NOTE — PROGRESS NOTES
Daily Note     Today's date: 2021  Patient name: Dee Dee Gilliam  : 1975  MRN: [id number]  Referring provider: Logan Olivia DO  Dx:   Encounter Diagnosis     ICD-10-CM    1  Cervical radiculitis  M54 12                   Subjective: Pt reports that her neck is still bothering her with the stabbing at the base of her skull to the point where it takes her breath away  She reports that she gets her injection on Wednesday and is hoping that it works  She does note though that the medication has been giving her GERD attacks  Objective: See treatment diary below      Assessment: Tolerated treatment well  Pt noted a decrease in the "stabbing" sensation during scapular retractions  Patient demonstrated fatigue post treatment, exhibited good technique with therapeutic exercises and would benefit from continued PT  Plan: Continue per plan of care  Progress treatment as tolerated  Complete RE at next visit        Precautions: Panic attacks, GERD              IE            Manuals             STM upper trap/scalene x8 ML  And suboccipitals ML ML ML and suboccciptals ML and suboccciptals ML and suboccciptals                                             Neuro Re-Ed             Cervical Isometrics  L SB   5" x10                                                                                         Ther Ex             UBE   3' ea 3'/3' 4'/4' 4'/4'  4'/4'       Chin tucks   3" x10  seated   3" x10         Scap Retractions  5"   2x10  5"  2x10 5"  2x10 5"  2x10 5" 2x10      TB Rows             TB Ext              No money's             SNAGs  L rot 5"x10    Subocciptial release with towel  5x3"        UT/Scalene St      L side   3x15" L   3x15"  L 3x20"           Cervical ROM  5"x5 ea               L SCM stretch  3x15"  L SCM stretch  3x20"      Ther Activity                                       Gait Training                                       Modalities             CP PRN             MHP PRN  x10 x10 x10 pre x10 pre x10 pre x10 pre

## 2021-08-25 ENCOUNTER — PROCEDURE VISIT (OUTPATIENT)
Dept: PAIN MEDICINE | Facility: MEDICAL CENTER | Age: 46
End: 2021-08-25
Payer: COMMERCIAL

## 2021-08-25 DIAGNOSIS — M54.81 OCCIPITAL NEURALGIA OF LEFT SIDE: Primary | ICD-10-CM

## 2021-08-25 PROCEDURE — 76942 ECHO GUIDE FOR BIOPSY: CPT | Performed by: PHYSICAL MEDICINE & REHABILITATION

## 2021-08-25 PROCEDURE — 64405 NJX AA&/STRD GR OCPL NRV: CPT | Performed by: PHYSICAL MEDICINE & REHABILITATION

## 2021-08-25 RX ORDER — METHYLPREDNISOLONE ACETATE 40 MG/ML
40 INJECTION, SUSPENSION INTRA-ARTICULAR; INTRALESIONAL; INTRAMUSCULAR; SOFT TISSUE ONCE
Status: COMPLETED | OUTPATIENT
Start: 2021-08-25 | End: 2021-08-25

## 2021-08-25 RX ORDER — BUPIVACAINE HYDROCHLORIDE 2.5 MG/ML
10 INJECTION, SOLUTION EPIDURAL; INFILTRATION; INTRACAUDAL ONCE
Status: COMPLETED | OUTPATIENT
Start: 2021-08-25 | End: 2021-08-25

## 2021-08-25 RX ADMIN — BUPIVACAINE HYDROCHLORIDE 10 ML: 2.5 INJECTION, SOLUTION EPIDURAL; INFILTRATION; INTRACAUDAL at 13:37

## 2021-08-25 RX ADMIN — METHYLPREDNISOLONE ACETATE 40 MG: 40 INJECTION, SUSPENSION INTRA-ARTICULAR; INTRALESIONAL; INTRAMUSCULAR; SOFT TISSUE at 13:38

## 2021-08-26 ENCOUNTER — EVALUATION (OUTPATIENT)
Dept: PHYSICAL THERAPY | Facility: REHABILITATION | Age: 46
End: 2021-08-26
Payer: COMMERCIAL

## 2021-08-26 DIAGNOSIS — M54.12 CERVICAL RADICULITIS: Primary | ICD-10-CM

## 2021-08-26 PROCEDURE — 97110 THERAPEUTIC EXERCISES: CPT

## 2021-08-26 NOTE — PROGRESS NOTES
Daily Note     Today's date: 2021  Patient name: Benja Barahona  : 1975  MRN: [id number]  Referring provider: Rebecca Connolly DO  Dx:   Encounter Diagnosis     ICD-10-CM    1  Cervical radiculitis  M54 12                 Assessment  Assessment details: Upon RE on this date, pt has made improvements with cervical ROM and LUE strength as well as overall tolerance to functional activities due to reports of decreased pain since receiving injections and a nerve block  Despite improvements noted pt continues to be limited in achieving full cervical ROM and LUE ROM due to mild discomfort and reports of remaining muscle tightness  At this time, pt would benefit from continued skilled physical therapy to continue improving ROM, strength, and overall tolerance to functional activities with decreased reports of pain and tightness for increased ease and safety with ADLs and functional mobility  Thank you for this referral      Impairments: abnormal muscle tone, abnormal or restricted ROM, abnormal movement, activity intolerance, impaired physical strength, lacks appropriate home exercise program, pain with function and poor posture     Symptom irritability: highUnderstanding of Dx/Px/POC: good   Prognosis: good   Goals  ST  Patient will report 25% decrease in pain in 4 weeks  CONTINUE   2  Patient will demonstrate 25% improvement in ROM in 4 weeks  MET   3  Patient will report being able to sleep with 25% improvement in 4 weeks  CONTINUE     LTG: CONTINUE ALL   1  Patient will be able to perform IADLS without restriction or pain by discharge  2  Patient will be independent in HEP by discharge  3  Patient will be able to return to recreational/work duties without restriction or pain by discharge  4  Pt will demonstrate WFL cervical ROM to demonstrate improved ease and safety with ADLs by discharge  5  Pt will report a 75% or greater improvement in ability to sleep without increased neck pain by discharge  Plan  Patient would benefit from: PT eval and skilled PT  Planned modality interventions: cryotherapy, thermotherapy: hydrocollator packs, electrical stimulation/Russian stimulation and TENS  Planned therapy interventions: IADL retraining, body mechanics training, flexibility, functional ROM exercises, home exercise program, neuromuscular re-education, manual therapy, postural training, strengthening, stretching, therapeutic activities, therapeutic exercise, joint mobilization, activity modification, massage and patient education  Frequency: 2x week  Duration in visits: 8  Duration in weeks: 4  Treatment plan discussed with: patient         Subjective Evaluation     History of Present Illness  Mechanism of injury: Pt reports that her neck pain started when she was on a kayak trip at the end of June  Pt reports that she really had to dig the paddles to move since the water was so low and the kayak was getting stuck on the ground  Pt reports that she gets stabbing pain on the L side of her neck and at the base of her skull  Pt notes that the pain stops at her shoulder blade  Pt reports that she is limited in day to day activities because of her neck  She notes she is unable to sleep, bend down, look in front of her  Pt reports no dizziness, nausea, double vision, or difficulty swallowing  Pt reports that she currently works as an  and works out of her home  Pt reports that she also has an MRI scheduled for next week, August 3rd in the morning      8/26/21  Pt reports that the constant ache that she had in the back of her head is gone since getting the nerve block yesterday but she has not done any activities which has previously given her the pain because her doctor told her to take it easy  Pt reports that the injection she got yesterday went well with no complications  She notes that since starting therapy she feels like there have been improvements in her neck ROM   She also reports that her doctor ordered her an MRI and they are waiting for approval before she can go for it  Pain  Pain scale: 1-2 after injection, 5-6 prior to injection   At worst pain ratin  Quality: "stabbing in the brain"   Relieving factors: medications, ice and heat  Aggravating factors: overhead activity, lifting, bending, head movements        Hand dominance: left     Diagnostic Tests  X-ray: normal (INDICATION:   M54 12: Radiculopathy, cervical region )  Treatments  Previous treatment: medication  Current treatment: medication  Patient Goals  Patient goals for therapy: decreased pain, increased motion, increased strength, independence with ADLs/IADLs and return to sport/leisure activities  Patient goal: "I want my neck to be normal"      Objective      Concurrent Complaints  Positive for disturbed sleep  Negative for dizziness, headaches, tinnitus and trouble swallowing     Postural Observations  Seated posture: fair        Palpation on IE   Left   Tenderness of the scalenes, sternocleidomastoid, suboccipitals and upper trapezius  Right   Tenderness of the suboccipitals and upper trapezius  21  Pt reported decreased tenderness to palpation at scalenes and upper trapezius   She did note mild discomfort in area of her suboccipitals but decreased severity compared to IE       Neurological Testing      Sensation   Cervical/Thoracic   Left   Intact: light touch     Right   Intact: light touch     Active Range of Motion   Cervical/Thoracic Spine         Cervical     Flexion: 35 no pain   Extension: 35 no pain     with pain  Left lateral flexion: 30 no pain  Right lateral flexion: 30 no pain   Left rotation: 55 degrees no pain   Right rotation: 50 degrees no pain        Strength/Myotome Testing     Left Shoulder      Planes of Motion   Flexion: 4   Abduction: 4   External rotation at 0°: 4   Internal rotation at 0°: 4      Left Elbow   Flexion: 4  Extension: 4      Tests   Cervical   Negative alar ligament test, Sharp-Dariana test and transverse ligament test       General Comments:        Cervical/Thoracic Comments  Pt demonstrated overall guarding and apprehension with movement throughout but was able to complete as needed  8/26/21  Pt demonstrated improved tolerance to ROM and overall body movements with decreased reports of pain and apprehension       Neuro Exam:      Headaches   Patient reports headaches: No      Precautions: Panic attacks, GERD       7/28 8/2 8/5 8/12 8/16 8/19 8/23 8/26      IE       RE     Manuals             STM upper trap/scalene x8 ML  And suboccipitals ML ML ML and suboccciptals ML and suboccciptals ML and suboccciptals                                             Neuro Re-Ed             Cervical Isometrics  L SB   5" x10                                                                                         Ther Ex             UBE   3' ea 3'/3' 4'/4' 4'/4'  4'/4'  4'/4'     Chin tucks   3" x10  seated   3" x10    nv     Scap Retractions  5"   2x10  5"  2x10 5"  2x10 5"  2x10 5" 2x10 5"  3x10     TB Rows        nv     TB Ext         nv     No money's        nv     SNAGs  L rot 5"x10    Subocciptial release with towel  5x3"        UT/[address]      L side   3x15" L   3x15"  L 3x20"  L 3x20"         Cervical ROM  5"x5 ea               L SCM stretch  3x15"  L SCM stretch  3x20" L SCM stretch  3x20"     Ther Activity                                       Gait Training                                       Modalities             CP PRN             MHP PRN  x10 x10 x10 pre x10 pre x10 pre x10 pre

## 2021-08-27 RX ORDER — VORTIOXETINE 5 MG/1
TABLET, FILM COATED ORAL
Qty: 30 TABLET | Refills: 1 | Status: SHIPPED | OUTPATIENT
Start: 2021-08-27 | End: 2021-11-16

## 2021-08-30 ENCOUNTER — TELEPHONE (OUTPATIENT)
Dept: PAIN MEDICINE | Facility: MEDICAL CENTER | Age: 46
End: 2021-08-30

## 2021-08-30 ENCOUNTER — APPOINTMENT (OUTPATIENT)
Dept: PHYSICAL THERAPY | Facility: REHABILITATION | Age: 46
End: 2021-08-30
Payer: COMMERCIAL

## 2021-08-30 NOTE — TELEPHONE ENCOUNTER
----- Message from Jadiel Carlos RN sent at 8/30/2021  3:22 PM EDT -----  Regarding: FW: Non-Urgent Medical Question  Contact: [phone]    ----- Message -----  From: Crystal Sterling  Sent: 8/30/2021   2:42 PM EDT  To: [facility]  Subject: RE: Non-Urgent Medical Question                  Good afternoon Shilpa,    Thank you for your quick reply and the information  I saw the rejection on my insurance website so I didn't scheduled the MRI last week  I didn't realize that I needed to have it scheduled before it was approved  I now have the MRI scheduled for September 8th at 2:30pm at the [address]      Thank you for your assistance,    Ravin Jernigan

## 2021-08-31 ENCOUNTER — OFFICE VISIT (OUTPATIENT)
Dept: PHYSICAL THERAPY | Facility: REHABILITATION | Age: 46
End: 2021-08-31
Payer: COMMERCIAL

## 2021-08-31 DIAGNOSIS — M54.12 CERVICAL RADICULITIS: Primary | ICD-10-CM

## 2021-08-31 PROCEDURE — 97110 THERAPEUTIC EXERCISES: CPT

## 2021-08-31 NOTE — PROGRESS NOTES
Daily Note     Today's date: 2021  Patient name: Benja Barahona  : 1975  MRN: [id number]  Referring provider: Rebecca Connolly DO  Dx:   Encounter Diagnosis     ICD-10-CM    1  Cervical radiculitis  M54 12                   Subjective: Pt reports that her neck has felt fine and she no longer notices any stabbing pain  She reports that she was able to do laundry and shower without complaints or issue  She does note however that she unfortunately cracked a molar this past weekend so that has been bothering her  Objective: See treatment diary below      Assessment: Tolerated treatment well  Pt required cueing to decrease upper trap activation during TB exercises with good carryover after correction  She also required tactile cueing during D1/D2 for proper form with good carryover after correction  Pt did well with addition of theraband exercises noting no increased complaints of pain throughout  Pt was educated on potential for post-treatment soreness, pt verbalized understanding  Patient demonstrated fatigue post treatment, exhibited good technique with therapeutic exercises and would benefit from continued PT  Plan: Continue per plan of care  Progress treatment as tolerated         Precautions: Panic attacks, GERD            IE       RE     Manuals             STM upper trap/scalene x8 ML  And suboccipitals ML ML ML and suboccciptals ML and suboccciptals ML and suboccciptals                                             Neuro Re-Ed             Cervical Isometrics  L SB   5" x10                                                                                         Ther Ex             UBE   3' ea 3'/3' 4'/4' 4'/4'  4'/4'  4'/4' 4'/4'     Chin tucks   3" x10  seated   3" x10    nv     Scap Retractions  5"   2x10  5"  2x10 5"  2x10 5"  2x10 5" 2x10 5"  3x10     TB Rows        nv RTB   2x10    TB Ext         nv RTB  2x10    No money's        nv     SNAGs  L rot 5"x10    Subocciptial release with towel  5x3"    RTB   ER b/l  2x10    UT/[address]      L side   3x15" L   3x15"  L 3x20"  L 3x20" L 3x20"        Cervical ROM  5"x5 ea     D1/D2  RTB   x10 ea          L SCM stretch  3x15"  L SCM stretch  3x20" L SCM stretch  3x20" L SCM stretch  3x20"    Ther Activity                                       Gait Training                                       Modalities             CP PRN             MHP PRN  x10 x10 x10 pre x10 pre x10 pre x10 pre  '8 post

## 2021-09-02 ENCOUNTER — OFFICE VISIT (OUTPATIENT)
Dept: PHYSICAL THERAPY | Facility: REHABILITATION | Age: 46
End: 2021-09-02
Payer: COMMERCIAL

## 2021-09-02 DIAGNOSIS — M54.12 CERVICAL RADICULITIS: Primary | ICD-10-CM

## 2021-09-02 PROCEDURE — 97112 NEUROMUSCULAR REEDUCATION: CPT

## 2021-09-02 PROCEDURE — 97110 THERAPEUTIC EXERCISES: CPT

## 2021-09-02 NOTE — PROGRESS NOTES
Daily Note     Today's date: 2021  Patient name: Dee Dee Gilliam  : 1975  MRN: [id number]  Referring provider: Logan Olivia DO  Dx:   Encounter Diagnosis     ICD-10-CM    1  Cervical radiculitis  M54 12                   Subjective: pt reports feeling really good today; denied pain prior to beginning today  Objective: See treatment diary below      Assessment: Tolerated progressions and treatment well  Initiated prone periscapular exercises with good response; minimal cues to maintain correct form  She reported slight onset of HA with prone Y's  Patient demonstrated fatigue post treatment, exhibited good technique with therapeutic exercises and would benefit from continued PT  Plan: Continue per plan of care  Progress treatment as tolerated         Precautions: Panic attacks, GERD           IE       RE     Manuals             STM upper trap/scalene x8 ML  And suboccipitals ML ML ML and suboccciptals ML and suboccciptals ML and suboccciptals                                             Neuro Re-Ed             Cervical Isometrics  L SB   5" x10                                                                                         Ther Ex             UBE   3' ea 3'/3' 4'/4' 4'/4'  4'/4'  4'/4' 4'/4'  5' ea   Chin tucks   3" x10  seated   3" x10    nv     Scap Retractions  5"   2x10  5"  2x10 5"  2x10 5"  2x10 5" 2x10 5"  3x10  Prone  5"x10   Prone I,T,Y          5"x10 ea   TB Rows        nv RTB   2x10 Pink 2x10   TB Ext         nv RTB  2x10 Pink 2x10   No money's        nv     SNAGs  L rot 5"x10    Subocciptial release with towel  5x3"    RTB   ER b/l  2x10 [place] 2x10   UT/[address]      L side   3x15" L   3x15"  L 3x20"  L 3x20" L 3x20"        Cervical ROM  5"x5 ea     D1/D2  RTB   x10 ea D1/D2  RTB   x10 ea         L SCM stretch  3x15"  L SCM stretch  3x20" L SCM stretch  3x20" L SCM stretch  3x20"    Ther Activity Gait Training                                       Modalities             CP PRN             MHP PRN  x10 x10 x10 pre x10 pre x10 pre x10 pre  '8 post

## 2021-09-07 ENCOUNTER — OFFICE VISIT (OUTPATIENT)
Dept: PHYSICAL THERAPY | Facility: REHABILITATION | Age: 46
End: 2021-09-07
Payer: COMMERCIAL

## 2021-09-07 DIAGNOSIS — M54.12 CERVICAL RADICULITIS: Primary | ICD-10-CM

## 2021-09-07 PROCEDURE — 97110 THERAPEUTIC EXERCISES: CPT

## 2021-09-07 NOTE — PROGRESS NOTES
Daily Note     Today's date: 2021  Patient name: Wanda Mueller  : 1975  MRN: [id number]  Referring provider: Lisandro Valenzuela DO  Dx:   Encounter Diagnosis     ICD-10-CM    1  Cervical radiculitis  M54 12                   Subjective: Pt reports that she had a lot going on this past weekend with her apartment including a cooking incident and issues with her bathroom and she had to do a lot of clean up which made her feel more sore  She notes that after last session she did not have any increased pain and felt okay  Objective: See treatment diary below      Assessment: Tolerated treatment well  Pt reported mild increase in neck/head symptoms throughout exercises but stated she wanted to continue  Pt also noted that once she finished an exercise, the symptoms decreased very shortly after  By end of session pt noted that her pain/discomfort was a 1/10 and she felt okay and no longer was experiencing what she felt during the exercises  Patient demonstrated fatigue post treatment, exhibited good technique with therapeutic exercises and would benefit from continued PT  Plan: Continue per plan of care  Progress treatment as tolerated         Precautions: Panic attacks, GERD                  RE     Manuals             STM upper trap/scalene  ML  And suboccipitals ML ML ML and suboccciptals ML and suboccciptals ML and suboccciptals                                             Neuro Re-Ed             Cervical Isometrics  L SB   5" x10                                                                                         Ther Ex             UBE 4'/4'   3' ea 3'/3' 4'/4' 4'/4'  4'/4'  4'/4' 4'/4'  5' ea   Chin tucks   3" x10  seated   3" x10    nv     Scap Retractions NV 5"   2x10  5"  2x10 5"  2x10 5"  2x10 5" 2x10 5"  3x10  Prone  5"x10   Prone I,T,Y NV         5"x10 ea   TB Rows RTB   2x10       nv RTB   2x10 Pink 2x10   TB Ext  RTB  2x10       nv RTB  2x10 Pink 2x10   No money's        nv     SNAGs RTB ER b/l  2x10 L rot 5"x10    Subocciptial release with towel  5x3"    RTB   ER b/l  2x10 [place] 2x10   UT/[address] L   3x20"     L side   3x15" L   3x15"  L 3x20"  L 3x20" L 3x20"     D1/D2  RTB   x10 ea   Cervical ROM  5"x5 ea     D1/D2  RTB   x10 ea D1/D2  RTB   x10 ea    L SCM stretch  3x20"     L SCM stretch  3x15"  L SCM stretch  3x20" L SCM stretch  3x20" L SCM stretch  3x20"    Ther Activity                                       Gait Training                                       Modalities             CP PRN             MHP PRN x10 post  x10 x10 x10 pre x10 pre x10 pre x10 pre  '8 post

## 2021-09-08 ENCOUNTER — HOSPITAL ENCOUNTER (OUTPATIENT)
Dept: RADIOLOGY | Facility: IMAGING CENTER | Age: 46
Discharge: HOME | End: 2021-09-08
Payer: COMMERCIAL

## 2021-09-08 DIAGNOSIS — M54.12 CERVICAL RADICULITIS: ICD-10-CM

## 2021-09-08 DIAGNOSIS — M54.81 OCCIPITAL NEURALGIA OF LEFT SIDE: ICD-10-CM

## 2021-09-08 PROCEDURE — 72141 MRI NECK SPINE W/O DYE: CPT

## 2021-09-08 PROCEDURE — G1004 CDSM NDSC: HCPCS

## 2021-09-09 ENCOUNTER — OFFICE VISIT (OUTPATIENT)
Dept: PHYSICAL THERAPY | Facility: REHABILITATION | Age: 46
End: 2021-09-09
Payer: COMMERCIAL

## 2021-09-09 ENCOUNTER — OFFICE VISIT (OUTPATIENT)
Dept: OBGYN CLINIC | Facility: MEDICAL CENTER | Age: 46
End: 2021-09-09
Payer: COMMERCIAL

## 2021-09-09 VITALS
BODY MASS INDEX: 23.95 KG/M2 | WEIGHT: 158 LBS | DIASTOLIC BLOOD PRESSURE: 60 MMHG | SYSTOLIC BLOOD PRESSURE: 110 MMHG | HEIGHT: 68 IN

## 2021-09-09 DIAGNOSIS — M54.12 CERVICAL RADICULITIS: Primary | ICD-10-CM

## 2021-09-09 DIAGNOSIS — Z01.419 WOMEN'S ANNUAL ROUTINE GYNECOLOGICAL EXAMINATION: Primary | ICD-10-CM

## 2021-09-09 DIAGNOSIS — Z12.31 ENCOUNTER FOR SCREENING MAMMOGRAM FOR MALIGNANT NEOPLASM OF BREAST: ICD-10-CM

## 2021-09-09 DIAGNOSIS — E28.319 EARLY MENOPAUSE: ICD-10-CM

## 2021-09-09 PROCEDURE — G0145 SCR C/V CYTO,THINLAYER,RESCR: HCPCS | Performed by: STUDENT IN AN ORGANIZED HEALTH CARE EDUCATION/TRAINING PROGRAM

## 2021-09-09 PROCEDURE — G0476 HPV COMBO ASSAY CA SCREEN: HCPCS | Performed by: STUDENT IN AN ORGANIZED HEALTH CARE EDUCATION/TRAINING PROGRAM

## 2021-09-09 PROCEDURE — 99386 PREV VISIT NEW AGE 40-64: CPT | Performed by: STUDENT IN AN ORGANIZED HEALTH CARE EDUCATION/TRAINING PROGRAM

## 2021-09-09 PROCEDURE — 97110 THERAPEUTIC EXERCISES: CPT

## 2021-09-09 NOTE — PROGRESS NOTES
[facility] [address] [place] [address]    ASSESSMENT/PLAN: Ayanna Washington is a 39 y o   who presents for annual gynecologic exam     Encounter for routine gynecologic examination  - Routine well woman exam completed today  - Cervical Cancer Screening: Current ASCCP Guidelines reviewed  H/o abnormals and LEEP  Pap done today  - HPV Vaccination status: Not immunized  - STI screening offered including HIV: Declines  - Contraceptive counseling discussed  Current contraception: none:   - Breast Cancer Screening: Last Mammogram 2016, ordered  - Discussed symptoms of early menopause, checked FSH/Estradiol  Additional problems addressed at this visit:  1  Women's annual routine gynecological examination  -     Liquid-based pap, screening  -     Mammo screening bilateral w 3d & cad; Future; Expected date: 2021    2  Encounter for screening mammogram for malignant neoplasm of breast  -     Mammo screening bilateral w 3d & cad; Future; Expected date: 2021    3  Early menopause  -     Follicle stimulating hormone; Future  -     Estradiol; Future          CC:  Annual Gynecologic Examination    HPI: Ayanna Washington is a 39 y o   who presents for annual gynecologic examination  Wmjagdish Meredith presents as a new patient for routine gynecologic care  She reports that she is recently insured  She has a history of abnormal pap smears and a LEEP about 10 years ago  She reports that her last period was 2 years ago and she had hot flashes last year  She wonders if she is in early menopause  She is grieving the death of her mother 1 year ago and reports that her depression is well controlled on antidepressant prescribed by her PCP  She is sexually active with one male partner  She reports no breast concerns  She has no vaginal discharge, vulvar or vaginal lesions, pelvic pain, or abnormal bleeding    She has no sexual health concerns and is currently sexually active with one male partner  She has no symptoms of pelvic organ prolapse, urinary, or fecal incontinence  She denies intimate partner violence  The following portions of the patient's history were reviewed and updated as appropriate: allergies, current medications, past family history, past medical history, obstetric history, gynecologic history, past social history, past surgical history and problem list     Review of Systems   Constitutional: Negative for appetite change, fatigue, fever and unexpected weight change  HENT: Negative for congestion and rhinorrhea  Eyes: Negative for visual disturbance  Respiratory: Negative for cough and shortness of breath  Cardiovascular: Negative for chest pain and palpitations  Gastrointestinal: Negative for abdominal distention, abdominal pain, blood in stool, constipation, diarrhea, nausea and vomiting  Endocrine: Negative for cold intolerance and heat intolerance  Genitourinary: Negative for dysuria, flank pain, hematuria, pelvic pain, urgency, vaginal bleeding and vaginal discharge  Musculoskeletal: Negative for back pain and myalgias  Skin: Negative for rash  Neurological: Negative for weakness and headaches  Hematological: Negative for adenopathy  Does not bruise/bleed easily  Psychiatric/Behavioral: Negative for dysphoric mood and suicidal ideas  The patient is not nervous/anxious  Objective:  /60   Ht 5' 8" (1 727 m)   Wt 71 7 kg (158 lb)   BMI 24 02 kg/m²    Physical Exam  Exam conducted with a chaperone present  Constitutional:       Appearance: Normal appearance  HENT:      Head: Normocephalic and atraumatic  Cardiovascular:      Rate and Rhythm: Normal rate  Pulmonary:      Effort: Pulmonary effort is normal    Chest:      Breasts: Breasts are symmetrical          Right: Normal  No swelling, bleeding, inverted nipple, mass, nipple discharge, skin change or tenderness           Left: Normal  No swelling, bleeding, inverted nipple, mass, nipple discharge, skin change or tenderness  Abdominal:      General: There is no distension  Tenderness: There is no abdominal tenderness  There is no guarding  Genitourinary:     Exam position: Lithotomy position  Pubic Area: No rash  Labia:         Right: No rash, tenderness or lesion  Left: No rash, tenderness or lesion  Urethra: No prolapse, urethral swelling or urethral lesion  Vagina: Normal  No vaginal discharge, erythema, tenderness, bleeding or lesions  Cervix: No cervical motion tenderness, discharge, friability or erythema  Uterus: Not enlarged, not tender and no uterine prolapse  Adnexa:         Right: No mass, tenderness or fullness  Left: No mass, tenderness or fullness  Lymphadenopathy:      Upper Body:      Right upper body: No axillary adenopathy  Left upper body: No axillary adenopathy  Lower Body: No right inguinal adenopathy  No left inguinal adenopathy  Neurological:      Mental Status: She is alert               Meme Gaona MD  [facility]  09/09/21 3:57 PM

## 2021-09-09 NOTE — PROGRESS NOTES
Daily Note     Today's date: 2021  Patient name: Jessica Patino  : 1975  MRN: [id number]  Referring provider: Fabiola Morgan DO  Dx:   Encounter Diagnosis     ICD-10-CM    1  Cervical radiculitis  M54 12                   Subjective: Pt reports that after last session she felt fine and had no complaints  Objective: See treatment diary below      Assessment: Tolerated treatment well  Pt required min tactile and verbal cueing for prone scap retraction and ITY with good carryover after correction  Pt also did well with progression to GTB from RTB for rows, ext and D1/D2 with increased muscle challenge noted but no increased pain  Patient demonstrated fatigue post treatment, exhibited good technique with therapeutic exercises and would benefit from continued PT  Plan: Continue per plan of care  Progress treatment as tolerated         Precautions: Panic attacks, GERD                  RE     Manuals             STM upper trap/scalene   ML ML ML and suboccciptals ML and suboccciptals ML and suboccciptals                                             Neuro Re-Ed             Cervical Isometrics                                                                                           Ther Ex             UBE 4'/4'  5'/5' 3' ea 3'/3' 4'/4' 4'/4'  4'/4'  4'/4' 4'/4'  5' ea   Chin tucks   Standing  scaption   2x10   3" x10    nv     Scap Retractions NV Prone   5"x10  5"  2x10 5"  2x10 5"  2x10 5" 2x10 5"  3x10  Prone  5"x10   Prone I,T,Y NV 5"x10 ea        5"x10 ea   TB Rows RTB   2x10 GTB   2x10      nv RTB   2x10 Pink 2x10   TB Ext  RTB  2x10 GTB   2x10      nv RTB  2x10 Pink 2x10   No money's        nv     SNAGs RTB ER b/l  2x10 nv    Subocciptial release with towel  5x3"    RTB   ER b/l  2x10 [place] 2x10   UT/[address] L   3x20" HEP    L side   3x15" L   3x15"  L 3x20"  L 3x20" L 3x20"     D1/D2  RTB   x10 ea D1/D2  GTB   x10 ea  Cervical ROM  5"x5 ea D1/D2  RTB   x10 ea D1/D2  RTB   x10 ea    L SCM stretch  3x20" HEP    L SCM stretch  3x15"  L SCM stretch  3x20" L SCM stretch  3x20" L SCM stretch  3x20"    Ther Activity                                       Gait Training                                       Modalities             CP PRN             MHP PRN x10 post   x10 x10 pre x10 pre x10 pre x10 pre  '8 post

## 2021-09-10 LAB
HPV HR 12 DNA CVX QL NAA+PROBE: NEGATIVE
HPV16 DNA CVX QL NAA+PROBE: NEGATIVE
HPV18 DNA CVX QL NAA+PROBE: NEGATIVE

## 2021-09-13 ENCOUNTER — EVALUATION (OUTPATIENT)
Dept: PHYSICAL THERAPY | Facility: REHABILITATION | Age: 46
End: 2021-09-13
Payer: COMMERCIAL

## 2021-09-13 DIAGNOSIS — M54.12 CERVICAL RADICULITIS: Primary | ICD-10-CM

## 2021-09-13 PROCEDURE — 97140 MANUAL THERAPY 1/> REGIONS: CPT

## 2021-09-13 PROCEDURE — 97110 THERAPEUTIC EXERCISES: CPT

## 2021-09-13 NOTE — PROGRESS NOTES
Daily Note     Today's date: 2021  Patient name: Tosin Jones  : 1975  MRN: [id number]  Referring provider: Juan Lal DO  Dx:   Encounter Diagnosis     ICD-10-CM    1  Cervical radiculitis  M54 12                 Assessment  Assessment details: Upon RE on this date, pt has made improvements with cervical ROM and LUE strength as well as overall tolerance to functional activities due to reports of decreased pain  Pt continues to be limited in achieving full cervical ROM and LUE strength especially L shoulder ER,  L biceps, and L triceps  Pts lifting mechanics were reviewed due to pt report of increased pain with lifitng tasks, pt was observed to lift with decreased reliance on leg musculature and with increased upper trap activation throughout  Pt was educated on proper lifting mechanics  At this time, pt would benefit from continued skilled physical therapy to continue improving ROM, strength, and overall tolerance to functional activities with decreased reports of pain and tightness for increased ease and safety with ADLs and functional mobility  Impairments: abnormal muscle tone, abnormal or restricted ROM, abnormal movement, activity intolerance, impaired physical strength, lacks appropriate home exercise program, pain with function and poor posture     Symptom irritability: highUnderstanding of Dx/Px/POC: good   Prognosis: good   Goals  ST  Patient will report 25% decrease in pain in 4 weeks  CONTINUE   2  Patient will demonstrate 25% improvement in ROM in 4 weeks  MET   3  Patient will report being able to sleep with 25% improvement in 4 weeks  CONTINUE     LTG: CONTINUE ALL   1  Patient will be able to perform IADLS without restriction or pain by discharge  2  Patient will be independent in HEP by discharge  3  Patient will be able to return to recreational/work duties without restriction or pain by discharge    4  Pt will demonstrate WFL cervical ROM to demonstrate improved ease and safety with ADLs by discharge  5  Pt will report a 75% or greater improvement in ability to sleep without increased neck pain by discharge       Plan  Patient would benefit from: PT eval and skilled PT  Planned modality interventions: cryotherapy, thermotherapy: hydrocollator packs, electrical stimulation/Russian stimulation and TENS  Planned therapy interventions: IADL retraining, body mechanics training, flexibility, functional ROM exercises, home exercise program, neuromuscular re-education, manual therapy, postural training, strengthening, stretching, therapeutic activities, therapeutic exercise, joint mobilization, activity modification, massage and patient education  Frequency: 2x week  Duration in visits: 8  Duration in weeks: 4  Treatment plan discussed with: patient         Subjective Evaluation     History of Present Illness  Mechanism of injury: Pt reports that her neck pain started when she was on a kayak trip at the end of June  Pt reports that she really had to dig the paddles to move since the water was so low and the kayak was getting stuck on the ground  Pt reports that she gets stabbing pain on the L side of her neck and at the base of her skull  Pt notes that the pain stops at her shoulder blade  Pt reports that she is limited in day to day activities because of her neck  She notes she is unable to sleep, bend down, look in front of her  Pt reports no dizziness, nausea, double vision, or difficulty swallowing  Pt reports that she currently works as an  and works out of her home  Pt reports that she also has an MRI scheduled for next week, August 3rd in the morning       8/26/21  Pt reports that the constant ache that she had in the back of her head is gone since getting the nerve block yesterday but she has not done any activities which has previously given her the pain because her doctor told her to take it easy   Pt reports that the injection she got yesterday went well with no complications  She notes that since starting therapy she feels like there have been improvements in her neck ROM  She also reports that her doctor ordered her an MRI and they are waiting for approval before she can go for it  21  Pt reports that her neck is starting to feel sore again  She notes that she has things that have to get done around her home and she has been doing increased activity  She reports that she did get her MRI results and that they were unremarkable per her report    Pt continues to report the most challenge with bending and lifting activities  Pt notes that if she had to  one pencil from the ground she would be able to do it but if she had to  10 she would start to feel it in her neck by the 3rd time          Pain  Pain scale: 2-3 stabbing    At worst pain ratin "then I stop what I'm doing"   Relieving factors: medications, ice and heat  Aggravating factors: overhead activity, lifting, bending, head movements        Hand dominance: left     Treatments  Previous treatment: medication  Current treatment: medication  Patient Goals  Patient goals for therapy: decreased pain, increased motion, increased strength, independence with ADLs/IADLs and return to sport/leisure activities  Patient goal: "I want my neck to be normal"      Objective        Postural Observations  Seated posture: fair        Palpation on IE   Left   Tenderness of the scalenes, sternocleidomastoid, suboccipitals and upper trapezius  Right   Tenderness of the suboccipitals and upper trapezius       21  Pt reported decreased tenderness to palpation at scalenes and upper trapezius   She did note mild discomfort in area of her suboccipitals but decreased severity compared to IE      21  Pt reported no increased TTP with STM and manual therapy       Active Range of Motion   Cervical/Thoracic Spine         Cervical     Flexion: 40 no pain   Extension: 55 no pain  Left lateral flexion: 35 no pain  Right lateral flexion: 40 no pain   Left rotation: 65 degrees no pain   Right rotation: 60 degrees no pain        Strength/Myotome Testing     Left Shoulder      Planes of Motion   Flexion:  4+/5  Abduction: 4+/5  External rotation at 0°:  4/5  Internal rotation at 0°:  4+/5     Left Elbow   Flexion: 4/5  Extension:  4/5    Pt reported no pain with MMT      Headaches   Patient reports headaches: No        Precautions: Panic attacks, GERD       9/7 9/9 9/13 8/12 8/16 8/19 8/23 8/26 8/31 9/2      RE     RE     Manuals             STM upper trap/scalene   ML and suboccciptals ML ML and suboccciptals ML and suboccciptals ML and suboccciptals                                             Neuro Re-Ed             Cervical Isometrics                                                                                           Ther Ex             UBE 4'/4'  5'/5' 4'/4' 3'/3' 4'/4' 4'/4'  4'/4'  4'/4' 4'/4'  5' ea   Chin tucks   Standing  scaption   2x10   3" x10    nv     Scap Retractions NV Prone   5"x10  5"  2x10 5"  2x10 5"  2x10 5" 2x10 5"  3x10  Prone  5"x10   Prone I,T,Y NV 5"x10 ea        5"x10 ea   TB Rows RTB   2x10 GTB   2x10      nv RTB   2x10 Pink 2x10   TB Ext  RTB  2x10 GTB   2x10      nv RTB  2x10 Pink 2x10   No money's        nv     SNAGs RTB ER b/l  2x10 nv    Subocciptial release with towel  5x3"    RTB   ER b/l  2x10 [place] 2x10   UT/[address] L   3x20" HEP    L side   3x15" L   3x15"  L 3x20"  L 3x20" L 3x20"     D1/D2  RTB   x10 ea D1/D2  GTB   x10 ea  Cervical ROM  5"x5 ea     D1/D2  RTB   x10 ea D1/D2  RTB   x10 ea    L SCM stretch  3x20" HEP    L SCM stretch  3x15"  L SCM stretch  3x20" L SCM stretch  3x20" L SCM stretch  3x20"    Ther Activity             Lifting Mechanics                           Gait Training                                       Modalities             CP PRN             MHP PRN x10 post   10 post x10 pre x10 pre x10 pre x10 pre  '8 post

## 2021-09-15 ENCOUNTER — OFFICE VISIT (OUTPATIENT)
Dept: PAIN MEDICINE | Facility: MEDICAL CENTER | Age: 46
End: 2021-09-15
Payer: COMMERCIAL

## 2021-09-15 VITALS
BODY MASS INDEX: 23.64 KG/M2 | SYSTOLIC BLOOD PRESSURE: 102 MMHG | HEIGHT: 68 IN | HEART RATE: 99 BPM | DIASTOLIC BLOOD PRESSURE: 71 MMHG | WEIGHT: 156 LBS

## 2021-09-15 DIAGNOSIS — M54.81 OCCIPITAL NEURALGIA OF LEFT SIDE: ICD-10-CM

## 2021-09-15 DIAGNOSIS — M79.18 MYOFASCIAL PAIN SYNDROME: ICD-10-CM

## 2021-09-15 DIAGNOSIS — M54.2 CERVICAL PAIN: Primary | ICD-10-CM

## 2021-09-15 DIAGNOSIS — Z72.0 TOBACCO ABUSE: ICD-10-CM

## 2021-09-15 LAB
LAB AP GYN PRIMARY INTERPRETATION: NORMAL
Lab: NORMAL

## 2021-09-15 PROCEDURE — 4004F PT TOBACCO SCREEN RCVD TLK: CPT | Performed by: NURSE PRACTITIONER

## 2021-09-15 PROCEDURE — 99213 OFFICE O/P EST LOW 20 MIN: CPT | Performed by: NURSE PRACTITIONER

## 2021-09-15 NOTE — PROGRESS NOTES
Assessment  1  Cervical pain    2  Occipital neuralgia of left side    3  Myofascial pain syndrome    4  Tobacco abuse        Plan  The patient was here for follow-up after her left greater occipital nerve block on 08/25/2021  The patient states that this injection was a game changer in that it relieved her pain by 100%  Today she has a pain score of 0/10  She states that she does get occasional pain depending on activity  When she does have her pain, she describes the quality as dull aching and sharp  She continues with physical therapy and states that it is helping to improve her pain  At this time she will follow-up with us on an as-needed basis  She will call the office if her pain returns and she needs further injections  My impressions and treatment recommendations were discussed in detail with the patient who verbalized understanding and had no further questions  Discharge instructions were provided  I personally saw and examined the patient and I agree with the above discussed plan of care  No orders of the defined types were placed in this encounter  No orders of the defined types were placed in this encounter  History of Present Illness    Rubén Conway is a 39 y o  female who presents to the office with a pain score of 0/10 today she states that her pain is occasional since her last injection and that the quality is dull aching and sharp  She states that the pain is situational and activity related  She is currently doing a formal physical therapy and states that this is helping with her pain  We did discuss the need for strengthening her back muscles to continue to have improvement in her pain level and she voiced an understanding of this  We also had a discussion related to her depression and how that could affect her pain levels    She states that she is doing much better since finding the right antidepressant medication but that she had a bad year since her mother's passing almost a year ago  We also discussed that she should try to work through the pain when she is exercising for a little bit longer each session to retrain her perception of pain  I discussed with her that since her MRI was normal, there was no fear of further injury if she exercises through her pain  We did discuss to gradually increase the time that she exercises and not to overdo things  We discussed the importance of proper body mechanics when working at a computer and using her cell phone as well as various tools that are used for stretching  I have personally reviewed and/or updated the patient's past medical history, past surgical history, family history, social history, current medications, allergies, and vital signs today  Review of Systems   Respiratory: Negative for shortness of breath  Cardiovascular: Positive for chest pain  Gastrointestinal: Negative for constipation, diarrhea, nausea and vomiting  Musculoskeletal: Positive for gait problem and neck pain  Negative for arthralgias, joint swelling and myalgias  Skin: Negative for rash  Neurological: Negative for dizziness, seizures and weakness  All other systems reviewed and are negative        Patient Active Problem List   Diagnosis    Closed avulsion fracture of lateral malleolus of left fibula    Bereavement    Gastroesophageal reflux disease without esophagitis    Tobacco abuse    Panic attack    Current moderate episode of major depressive disorder without prior episode ([facility] [address] )    Well adult exam    Cervical radiculitis    Depression, recurrent ([address]    Myofascial pain syndrome    Cervical pain    Occipital neuralgia of left side       Past Medical History:   Diagnosis Date    Anxiety     Depression     Gallbladder disorder     GERD (gastroesophageal reflux disease)     Menorrhagia     Neck pain on left side     Severe dysmenorrhea        Past Surgical History:   Procedure Laterality Date    CERVICAL BIOPSY  W/ LOOP ELECTRODE EXCISION  2006    CHOLECYSTECTOMY      WISDOM TOOTH EXTRACTION         Family History   Problem Relation Age of Onset    Lung cancer Mother     No Known Problems Father        Social History     Occupational History    Occupation:    Tobacco Use    Smoking status: Current Every Day Smoker     Packs/day: 0 50     Types: Cigarettes    Smokeless tobacco: Never Used   Vaping Use    Vaping Use: Former   Substance and Sexual Activity    Alcohol use: Yes     Comment: rarely    Drug use: Not Currently    Sexual activity: Yes     Partners: Male     Birth control/protection: Condom Male       Current Outpatient Medications on File Prior to Visit   Medication Sig    ALPRAZolam (XANAX) 0 5 mg tablet Take 1 tablet (0 5 mg total) by mouth 2 (two) times a day as needed for anxiety    calcium carbonate-vitamin D (OSCAL-D) 500 mg-200 units per tablet Take 1 tablet by mouth daily    celecoxib (CeleBREX) 200 mg capsule Take 1 capsule (200 mg total) by mouth 2 (two) times a day as needed for mild pain    desvenlafaxine succinate (PRISTIQ) 100 mg 24 hr tablet Take 1 tablet (100 mg total) by mouth daily Genic brand only take 1 tab by mouth daily   diphenhydrAMINE (BENADRYL) 50 mg capsule Take 50 mg by mouth daily as needed    gabapentin (NEURONTIN) 300 mg capsule Take 1 capsule (300 mg total) by mouth 2 (two) times a day    methocarbamol (ROBAXIN) 500 mg tablet Take 1 tablet (500 mg total) by mouth 4 (four) times a day    omeprazole (PriLOSEC) 40 MG capsule Take 40 mg by mouth 2 (two) times a day    patient supplied medication Place on the skin as needed CBD Creme    Probiotic Product (PROBIOTIC-10 PO) Take by mouth    zolpidem (AMBIEN) 10 mg tablet Take 1 tablet (10 mg total) by mouth daily at bedtime as needed for sleep    Trintellix 5 MG tablet TAKE 1 TABLET BY MOUTH EVERY DAY     No current facility-administered medications on file prior to visit         Allergies   Allergen Reactions    Bee Pollen     Bee Venom     Cat Hair Extract     Diclofenac      Action Taken: FLU LIKE SYMPTOMS ; BODY ACHES; Category: Allergy; Annotation - [id number]: FLU LIKE SYMPTOMS ; BODY ACHES    Dog Epithelium     Dog Epithelium Allergy Skin Test     Dust Mite Extract     Grass Extracts  [Gramineae Pollens]     Other      Annotation - [id number]: Root Beer , [place]    Pollen Extract     Root Beer Flavor        Physical Exam    /71   Pulse 99   Ht 5' 8" (1 727 m)   Wt 70 8 kg (156 lb)   BMI 23 72 kg/m²     Constitutional: normal, well developed, well nourished, alert, in no distress and non-toxic and no overt pain behavior    Eyes: anicteric  HEENT: grossly intact  Neck: supple, symmetric, trachea midline and no masses   Pulmonary:even and unlabored  Cardiovascular:No edema or pitting edema present  Skin:Normal without rashes or lesions and well hydrated  Psychiatric:Mood and affect appropriate  Neurologic:Cranial Nerves II-XII grossly intact  Musculoskeletal:normal and Mild tenderness to palpate cervical paraspinals, but no active muscle spasms    Imaging

## 2021-09-16 ENCOUNTER — OFFICE VISIT (OUTPATIENT)
Dept: PHYSICAL THERAPY | Facility: REHABILITATION | Age: 46
End: 2021-09-16
Payer: COMMERCIAL

## 2021-09-16 DIAGNOSIS — M54.12 CERVICAL RADICULITIS: Primary | ICD-10-CM

## 2021-09-16 PROCEDURE — 97530 THERAPEUTIC ACTIVITIES: CPT

## 2021-09-16 PROCEDURE — 97110 THERAPEUTIC EXERCISES: CPT

## 2021-09-16 NOTE — PROGRESS NOTES
Daily Note     Today's date: 2021  Patient name: Sherry Cevallos  : 1975  MRN: [id number]  Referring provider: John Snyder DO  Dx:   Encounter Diagnosis     ICD-10-CM    1  Cervical radiculitis  M54 12                   Subjective: Pt reports that she followed up with her doctor and was told she does not have a herniated disc  She reports that after last session she noted soreness in her legs from working on the lifting mechanics but has no pain in her neck  Objective: See treatment diary below      Assessment: Tolerated treatment well  Pt required cueing lifting mechanics for proper form with decreased upper trap activation, equal weight bearing throughout her legs, and overall proper form  After lifting mechanics were reviewed pt reported that she could feel it working her legs but had no increased soreness or stabbing in her neck  Patient demonstrated fatigue post treatment, exhibited good technique with therapeutic exercises and would benefit from continued PT  Plan: Continue per plan of care  Progress treatment as tolerated         Precautions: Panic attacks, GERD             RE     RE     Manuals             STM upper trap/scalene   ML and suboccciptals  ML and suboccciptals ML and suboccciptals ML and suboccciptals                                             Neuro Re-Ed             Cervical Isometrics                                                                                           Ther Ex             UBE 4'/4'  5'/5' 4'/4' L3 5'/5' 4'/4' 4'/4'  4'/4'  4'/4' 4'/4'  5' ea   Chin tucks   Standing  scaption   2x10  Standing  scaption   2x10 2# 3" x10    nv     Scap Retractions NV Prone   5"x10  Prone   5"x15 5"  2x10 5"  2x10 5" 2x10 5"  3x10  Prone  5"x10   Prone I,T,Y NV 5"x10 ea        5"x10 ea   TB Rows RTB   2x10 GTB   2x10  GTB   3x10    nv RTB   2x10 Pink 2x10   TB Ext  RTB  2x10 GTB   2x10  GTB   3x10    nv RTB  2x10 Pink 2x10   No money's        nv     SNAGs RTB ER b/l  2x10 nv   RTB ER b/l  3x10 Subocciptial release with towel  5x3"    RTB   ER b/l  2x10 [place] 2x10   UT/[address] L   3x20" HEP   L side   3x15" L   3x15"  L 3x20"  L 3x20" L 3x20"     D1/D2  RTB   x10 ea D1/D2  GTB   x10 ea       D1/D2  RTB   x10 ea D1/D2  RTB   x10 ea    L SCM stretch  3x20" HEP    L SCM stretch  3x15"  L SCM stretch  3x20" L SCM stretch  3x20" L SCM stretch  3x20"    Ther Activity             Lifting Mechanics     Squats 10# with cueing   2x10                      Gait Training                                       Modalities             CP PRN             MHP PRN x10 post   10 post  x10 pre x10 pre x10 pre  '8 post

## 2021-09-20 ENCOUNTER — OFFICE VISIT (OUTPATIENT)
Dept: PHYSICAL THERAPY | Facility: REHABILITATION | Age: 46
End: 2021-09-20
Payer: COMMERCIAL

## 2021-09-20 DIAGNOSIS — M54.12 CERVICAL RADICULITIS: Primary | ICD-10-CM

## 2021-09-20 PROCEDURE — 97110 THERAPEUTIC EXERCISES: CPT

## 2021-09-20 NOTE — PROGRESS NOTES
Daily Note     Today's date: 2021  Patient name: Rubén Conway  : 1975  MRN: [id number]  Referring provider: Teresita Martinez DO  Dx:   Encounter Diagnosis     ICD-10-CM    1  Cervical radiculitis  M54 12                   Subjective: Pt reports that after last session she had a little bit of soreness around a 1-2 on a pain scale but notes she did not have any of the stabbing or long lasting soreness  Pt reports that today she is feeling okay  Objective: See treatment diary below      Assessment: Tolerated treatment well  Pt noted improved squat mechanics today compared to last session and was able to complete 10 reps with increased weight  Pt required verbal and tactile cueing during supine scap retractions with good carryover after correction  Patient demonstrated fatigue post treatment, exhibited good technique with therapeutic exercises and would benefit from continued PT  Plan: Continue per plan of care  Progress treatment as tolerated         Precautions: Panic attacks, GERD             RE     RE     Manuals             STM upper trap/scalene   ML and suboccciptals   ML and suboccciptals ML and suboccciptals                                             Neuro Re-Ed             Cervical Isometrics                                                                                           Ther Ex             UBE 4'/4'  5'/5' 4'/4' L3 5'/5' L3 5'/5' 4'/4'  4'/4'  4'/4' 4'/4'  5' ea   Chin tucks   Standing  scaption   2x10  Standing  scaption   2x10 2# Standing  scaption   1x10 2#  1x10 3#   nv     Scap Retractions NV Prone   5"x10  Prone   5"x15 Prone   5"x15 5"  2x10 5" 2x10 5"  3x10  Prone  5"x10   Prone I,T,Y NV 5"x10 ea        5"x10 ea   TB Rows RTB   2x10 GTB   2x10  GTB   3x10 GTB   3x10   nv RTB   2x10 Pink 2x10   TB Ext  RTB  2x10 GTB   2x10  GTB   3x10 GTB   3x10   nv RTB  2x10 Pink 2x10   No money's        nv     SNAGs RTB ER b/l  2x10 nv   RTB ER b/l  3x10 RTB ER b/l  3x10    RTB   ER b/l  2x10 [place] 2x10   UT/[address] L   3x20" HEP    L   3x15"  L 3x20"  L 3x20" L 3x20"     D1/D2  RTB   x10 ea D1/D2  GTB   x10 ea       D1/D2  RTB   x10 ea D1/D2  RTB   x10 ea    L SCM stretch  3x20" HEP    L SCM stretch  3x15"  L SCM stretch  3x20" L SCM stretch  3x20" L SCM stretch  3x20"    Ther Activity             Lifting Mechanics     Squats 10# with cueing   2x10 Squat mechanics x10   10#   x10   15#   x10                     Gait Training                                       Modalities             CP PRN             MHP PRN x10 post   10 post  x10 post x10 pre x10 pre  '8 post

## 2021-09-23 ENCOUNTER — OFFICE VISIT (OUTPATIENT)
Dept: PHYSICAL THERAPY | Facility: REHABILITATION | Age: 46
End: 2021-09-23
Payer: COMMERCIAL

## 2021-09-23 DIAGNOSIS — M54.12 CERVICAL RADICULITIS: Primary | ICD-10-CM

## 2021-09-23 PROCEDURE — 97110 THERAPEUTIC EXERCISES: CPT

## 2021-09-23 PROCEDURE — 97112 NEUROMUSCULAR REEDUCATION: CPT

## 2021-09-23 NOTE — PROGRESS NOTES
Daily Note     Today's date: 2021  Patient name: Svitlana Montiel  : 1975  MRN: [id number]  Referring provider: Ronnald Schilder, DO  Dx:   Encounter Diagnosis     ICD-10-CM    1  Cervical radiculitis  M54 12                   Subjective: pt reports with c/o soreness following last visit for a short duration  She denied sharp pain in upper cervical region recently  Objective: See treatment diary below      Assessment: Tolerated treatment well  Denied onset of neck pain t/o treatment  Intermittent cues to avoid elevating shoulders  Patient demonstrated fatigue post treatment, exhibited good technique with therapeutic exercises and would benefit from continued PT      Plan: Continue per plan of care  Progress treatment as tolerated         Precautions: Panic attacks, GERD             RE     RE     Manuals             STM upper trap/scalene   ML and suboccciptals    ML and suboccciptals                                             Neuro Re-Ed             Cervical Isometrics                                                                                           Ther Ex             UBE 4'/4'  5'/5' 4'/4' L3 5'/5' L3 5'/5'  L3 5'ea 4'/4'  4'/4' 4'/4'  5' ea   Robison carries/suitcase carries      x2 laps 5# ea       Chin tucks   Standing  scaption   2x10  Standing  scaption   2x10 2# Standing  scaption   1x10 2#  1x10 3# Stand 3# x20  nv     Scap Retractions NV Prone   5"x10  Prone   5"x15 Prone   5"x15 prone 5"x20 5" 2x10 5"  3x10  Prone  5"x10   Prone I,T,Y NV 5"x10 ea        5"x10 ea   TB Rows RTB   2x10 GTB   2x10  GTB   3x10 GTB   3x10 GTB   3x10  nv RTB   2x10 Pink 2x10   TB Ext  RTB  2x10 GTB   2x10  GTB   3x10 GTB   3x10 GTB   3x10  nv RTB  2x10 Pink 2x10   No money's        nv     SNAGs RTB ER b/l  2x10 nv   RTB ER b/l  3x10 RTB ER b/l  3x10 RTB ER b/l  3x10   RTB   ER b/l  2x10 [place] 2x10   UT/[address] L   3x20" HEP     L 3x20"  L 3x20" L 3x20" D1/D2  RTB   x10 ea D1/D2  GTB   x10 ea       D1/D2  RTB   x10 ea D1/D2  RTB   x10 ea    L SCM stretch  3x20" HEP      L SCM stretch  3x20" L SCM stretch  3x20" L SCM stretch  3x20"    Ther Activity             Lifting Mechanics     Squats 10# with cueing   2x10 Squat mechanics x10   10#   x10   15#   x10 Squat mechanics x10   15# kb  x10                    Gait Training                                       Modalities             CP PRN             MHP PRN x10 post   10 post  x10 post x10 pre x10 pre  '8 post

## 2021-09-27 ENCOUNTER — OFFICE VISIT (OUTPATIENT)
Dept: PHYSICAL THERAPY | Facility: REHABILITATION | Age: 46
End: 2021-09-27
Payer: COMMERCIAL

## 2021-09-27 DIAGNOSIS — M54.12 CERVICAL RADICULITIS: Primary | ICD-10-CM

## 2021-09-27 PROCEDURE — 97110 THERAPEUTIC EXERCISES: CPT

## 2021-09-27 PROCEDURE — 97530 THERAPEUTIC ACTIVITIES: CPT

## 2021-09-27 NOTE — PROGRESS NOTES
Daily Note     Today's date: 2021  Patient name: Jessica Patino  : 1975  MRN: [id number]  Referring provider: Fabiola Morgan DO  Dx:   Encounter Diagnosis     ICD-10-CM    1  Cervical radiculitis  M54 12                   Subjective: Pt reports that her neck is feeling okay and has not had any issues with her neck  She reports that last session she got a good workout but did not noticed any increased soreness after  Objective: See treatment diary below      Assessment: Tolerated treatment well  Pt required cueing for decreased upper trapezius activation during suitcase carries with good carryover after correction  Pt required continued cueing during prone scap retractions for proper form with observed self-correcting throughout  Patient demonstrated fatigue post treatment, exhibited good technique with therapeutic exercises and would benefit from continued PT  Plan: Continue per plan of care  Progress treatment as tolerated         Precautions: Panic attacks, GERD             RE     RE     Manuals             STM upper trap/scalene   ML and suboccciptals                                                 Neuro Re-Ed             Cervical Isometrics                                                                                           Ther Ex             UBE 4'/4'  5'/5' 4'/4' L3 5'/5' L3 5'/5'  L3 5'ea L3 5'ea 4'/4' 4'/4'  5' ea   Robison carries/suitcase carries      x2 laps 5# ea x2 laps 10# ea      Chin tucks   Standing  scaption   2x10  Standing  scaption   2x10 2# Standing  scaption   1x10 2#  1x10 3# Stand 3# x20 Stand 3# x20 nv     Scap Retractions NV Prone   5"x10  Prone   5"x15 Prone   5"x15 prone 5"x20 prone1# 5" 2x10 5"  3x10  Prone  5"x10   Prone I,T,Y NV 5"x10 ea        5"x10 ea   TB Rows RTB   2x10 GTB   2x10  GTB   3x10 GTB   3x10 GTB   3x10 GTB   3x10 nv RTB   2x10 Pink 2x10   TB Ext  RTB  2x10 GTB   2x10  GTB   3x10 GTB   3x10 GTB 3x10 GTB   3x10 nv RTB  2x10 Pink 2x10   No money's        nv     SNAGs RTB ER b/l  2x10 nv   RTB ER b/l  3x10 RTB ER b/l  3x10 RTB ER b/l  3x10 RTB ER b/l  3x10  RTB   ER b/l  2x10 [place] 2x10   UT/Scalene St L   3x20" HEP     W's RTB   x10 L 3x20" L 3x20"     D1/D2  RTB   x10 ea D1/D2  GTB   x10 ea     Robbers  RTB  x10  D1/D2  RTB   x10 ea D1/D2  RTB   x10 ea    L SCM stretch  3x20" HEP       L SCM stretch  3x20" L SCM stretch  3x20"    Ther Activity             Lifting Mechanics     Squats 10# with cueing   2x10 Squat mechanics x10   10#   x10   15#   x10 Squat mechanics x10   15# kb  x10 Squat mechanics x3   15# kb  2x10                   Gait Training                                       Modalities             CP PRN             MHP PRN x10 post   10 post  x10 post x10 pre x10 post   '8 post

## 2021-09-28 DIAGNOSIS — M62.838 MUSCLE SPASM: ICD-10-CM

## 2021-09-28 DIAGNOSIS — M54.81 OCCIPITAL NEURALGIA OF LEFT SIDE: ICD-10-CM

## 2021-09-28 DIAGNOSIS — M54.2 ACUTE NECK PAIN: ICD-10-CM

## 2021-09-28 DIAGNOSIS — M79.18 MYOFASCIAL PAIN SYNDROME: ICD-10-CM

## 2021-09-28 DIAGNOSIS — M54.12 CERVICAL RADICULITIS: ICD-10-CM

## 2021-09-28 RX ORDER — GABAPENTIN 300 MG/1
300 CAPSULE ORAL 2 TIMES DAILY
Qty: 60 CAPSULE | Refills: 2 | Status: SHIPPED | OUTPATIENT
Start: 2021-09-28 | End: 2021-12-28 | Stop reason: SDUPTHER

## 2021-09-30 ENCOUNTER — OFFICE VISIT (OUTPATIENT)
Dept: PHYSICAL THERAPY | Facility: REHABILITATION | Age: 46
End: 2021-09-30
Payer: COMMERCIAL

## 2021-09-30 DIAGNOSIS — M54.12 CERVICAL RADICULITIS: Primary | ICD-10-CM

## 2021-09-30 PROCEDURE — 97530 THERAPEUTIC ACTIVITIES: CPT

## 2021-09-30 PROCEDURE — 97110 THERAPEUTIC EXERCISES: CPT

## 2021-09-30 NOTE — PROGRESS NOTES
Daily Note     Today's date: 2021  Patient name: Gertrudis Bustillos  : 1975  MRN: [id number]  Referring provider: Margarita Figueredo DO  Dx:   Encounter Diagnosis     ICD-10-CM    1  Cervical radiculitis  M54 12                   Subjective: Pt reports that overall her neck is doing well  Objective: See treatment diary below      Assessment: Tolerated treatment well  Pt did well with progression to 15# for farmers carry with noted fatigue but no increased pain  Pt demonstrated fatigue after squat mechanics exercise with seated rest break required after completion  Pt also demonstrated fatigue with standing Y exercise with compensation pattern of shoulder hike and trunk lateral lean noted with last repetition  Patient demonstrated fatigue post treatment, exhibited good technique with therapeutic exercises and would benefit from continued PT  Plan: Continue per plan of care  Progress treatment as tolerated          Precautions: Panic attacks, GERD               RE          Manuals             STM upper trap/scalene   ML and suboccciptals                                                 Neuro Re-Ed             Cervical Isometrics                                                                                           Ther Ex             UBE 4'/4'  5'/5' 4'/4' L3 5'/5' L3 5'/5'  L3 5'ea L3 5'ea L3 5'ea     Robison carries/suitcase carries      x2 laps 5# ea x2 laps 10# ea x2 laps   10#     x2 lap 15#     Chin tucks   Standing  scaption   2x10  Standing  scaption   2x10 2# Standing  scaption   1x10 2#  1x10 3# Stand 3# x20 Stand 3# x20 Stand 3# x20     Scap Retractions NV Prone   5"x10  Prone   5"x15 Prone   5"x15 prone 5"x20 prone1# 5" 2x10      Prone I,T,Y NV 5"x10 ea           TB Rows RTB   2x10 GTB   2x10  GTB   3x10 GTB   3x10 GTB   3x10 GTB   3x10 GTB   3x10 Blue    TB Ext  RTB  2x10 GTB   2x10  GTB   3x10 GTB   3x10 GTB   3x10 GTB   3x10 GTB   3x10 Blue    No money's             SNAGs RTB ER b/l  2x10 nv   RTB ER b/l  3x10 RTB ER b/l  3x10 RTB ER b/l  3x10 RTB ER b/l  3x10 RTB ER b/l  3x10     UT/Scalene St L   3x20" HEP     W's RTB   x10 W's RTB   2x10      D1/D2  RTB   x10 ea D1/D2  GTB   x10 ea     Robbers  RTB  x10 Robbers  RTB  x10      L SCM stretch  3x20" HEP            Ther Activity             Lifting Mechanics     Squats 10# with cueing   2x10 Squat mechanics x10   10#   x10   15#   x10 Squat mechanics x10   15# kb  x10 Squat mechanics x3   15# kb  2x10 Squat mechanics x5   15# kb  2x10                  Gait Training                                       Modalities             CP PRN             MHP PRN x10 post   10 post  x10 post x10 pre x10 post  x10 post sup

## 2021-10-01 ENCOUNTER — HOSPITAL ENCOUNTER (OUTPATIENT)
Dept: RADIOLOGY | Facility: IMAGING CENTER | Age: 46
Discharge: HOME | End: 2021-10-01
Payer: COMMERCIAL

## 2021-10-01 VITALS — HEIGHT: 68 IN | WEIGHT: 157 LBS | BODY MASS INDEX: 23.79 KG/M2

## 2021-10-01 DIAGNOSIS — F41.0 PANIC ATTACK: ICD-10-CM

## 2021-10-01 DIAGNOSIS — Z01.419 WOMEN'S ANNUAL ROUTINE GYNECOLOGICAL EXAMINATION: ICD-10-CM

## 2021-10-01 DIAGNOSIS — Z12.31 ENCOUNTER FOR SCREENING MAMMOGRAM FOR MALIGNANT NEOPLASM OF BREAST: ICD-10-CM

## 2021-10-01 PROCEDURE — 77067 SCR MAMMO BI INCL CAD: CPT

## 2021-10-01 PROCEDURE — 77063 BREAST TOMOSYNTHESIS BI: CPT

## 2021-10-01 RX ORDER — ALPRAZOLAM 0.5 MG/1
TABLET ORAL
COMMUNITY
End: 2021-10-01

## 2021-10-01 RX ORDER — ALPRAZOLAM 0.5 MG/1
TABLET ORAL
Qty: 90 TABLET | Refills: 0 | Status: SHIPPED | OUTPATIENT
Start: 2021-10-01 | End: 2021-10-04

## 2021-10-04 ENCOUNTER — OFFICE VISIT (OUTPATIENT)
Dept: PHYSICAL THERAPY | Facility: REHABILITATION | Age: 46
End: 2021-10-04
Payer: COMMERCIAL

## 2021-10-04 DIAGNOSIS — M54.12 CERVICAL RADICULITIS: Primary | ICD-10-CM

## 2021-10-04 PROCEDURE — 97110 THERAPEUTIC EXERCISES: CPT

## 2021-10-07 ENCOUNTER — EVALUATION (OUTPATIENT)
Dept: PHYSICAL THERAPY | Facility: REHABILITATION | Age: 46
End: 2021-10-07
Payer: COMMERCIAL

## 2021-10-07 DIAGNOSIS — M54.12 CERVICAL RADICULITIS: Primary | ICD-10-CM

## 2021-10-07 PROCEDURE — 97110 THERAPEUTIC EXERCISES: CPT

## 2021-10-07 PROCEDURE — 97164 PT RE-EVAL EST PLAN CARE: CPT

## 2021-10-11 ENCOUNTER — OFFICE VISIT (OUTPATIENT)
Dept: PHYSICAL THERAPY | Facility: REHABILITATION | Age: 46
End: 2021-10-11
Payer: COMMERCIAL

## 2021-10-11 DIAGNOSIS — M54.12 CERVICAL RADICULITIS: Primary | ICD-10-CM

## 2021-10-11 PROCEDURE — 97530 THERAPEUTIC ACTIVITIES: CPT

## 2021-10-11 PROCEDURE — 97110 THERAPEUTIC EXERCISES: CPT

## 2021-10-14 ENCOUNTER — APPOINTMENT (OUTPATIENT)
Dept: PHYSICAL THERAPY | Facility: REHABILITATION | Age: 46
End: 2021-10-14
Payer: COMMERCIAL

## 2021-10-19 ENCOUNTER — OFFICE VISIT (OUTPATIENT)
Dept: PHYSICAL THERAPY | Facility: REHABILITATION | Age: 46
End: 2021-10-19
Payer: COMMERCIAL

## 2021-10-19 DIAGNOSIS — M54.12 CERVICAL RADICULITIS: Primary | ICD-10-CM

## 2021-10-19 PROCEDURE — 97110 THERAPEUTIC EXERCISES: CPT

## 2021-10-19 PROCEDURE — 97530 THERAPEUTIC ACTIVITIES: CPT

## 2021-10-28 ENCOUNTER — OFFICE VISIT (OUTPATIENT)
Dept: PHYSICAL THERAPY | Facility: REHABILITATION | Age: 46
End: 2021-10-28
Payer: COMMERCIAL

## 2021-10-28 DIAGNOSIS — M54.12 CERVICAL RADICULITIS: Primary | ICD-10-CM

## 2021-10-28 PROCEDURE — 97530 THERAPEUTIC ACTIVITIES: CPT

## 2021-10-28 PROCEDURE — 97110 THERAPEUTIC EXERCISES: CPT

## 2021-11-16 ENCOUNTER — OFFICE VISIT (OUTPATIENT)
Dept: FAMILY MEDICINE CLINIC | Facility: CLINIC | Age: 46
End: 2021-11-16
Payer: COMMERCIAL

## 2021-11-16 VITALS
HEART RATE: 89 BPM | OXYGEN SATURATION: 97 % | TEMPERATURE: 96.5 F | SYSTOLIC BLOOD PRESSURE: 116 MMHG | DIASTOLIC BLOOD PRESSURE: 72 MMHG | HEIGHT: 68 IN | BODY MASS INDEX: 24.4 KG/M2 | WEIGHT: 161 LBS

## 2021-11-16 DIAGNOSIS — M54.12 CERVICAL RADICULITIS: ICD-10-CM

## 2021-11-16 DIAGNOSIS — F33.9 DEPRESSION, RECURRENT (HCC): ICD-10-CM

## 2021-11-16 DIAGNOSIS — F32.1 CURRENT MODERATE EPISODE OF MAJOR DEPRESSIVE DISORDER WITHOUT PRIOR EPISODE (HCC): Primary | ICD-10-CM

## 2021-11-16 DIAGNOSIS — M79.18 MYOFASCIAL PAIN SYNDROME: ICD-10-CM

## 2021-11-16 PROCEDURE — 99213 OFFICE O/P EST LOW 20 MIN: CPT | Performed by: FAMILY MEDICINE

## 2021-11-16 PROCEDURE — 3008F BODY MASS INDEX DOCD: CPT | Performed by: FAMILY MEDICINE

## 2021-11-16 PROCEDURE — 4004F PT TOBACCO SCREEN RCVD TLK: CPT | Performed by: FAMILY MEDICINE

## 2021-11-16 RX ORDER — PREDNISONE 10 MG/1
TABLET ORAL
Qty: 63 TABLET | Refills: 0 | Status: SHIPPED | OUTPATIENT
Start: 2021-11-16 | End: 2022-03-16

## 2021-11-16 RX ORDER — METHOCARBAMOL 500 MG/1
500 TABLET, FILM COATED ORAL 4 TIMES DAILY
Qty: 120 TABLET | Refills: 1 | Status: SHIPPED | OUTPATIENT
Start: 2021-11-16 | End: 2022-01-18 | Stop reason: SDUPTHER

## 2021-11-16 RX ORDER — KETOROLAC TROMETHAMINE 30 MG/ML
60 INJECTION, SOLUTION INTRAMUSCULAR; INTRAVENOUS ONCE
Status: COMPLETED | OUTPATIENT
Start: 2021-11-16 | End: 2021-11-16

## 2021-11-16 RX ADMIN — KETOROLAC TROMETHAMINE 60 MG: 30 INJECTION, SOLUTION INTRAMUSCULAR; INTRAVENOUS at 17:20

## 2021-12-23 ENCOUNTER — TELEPHONE (OUTPATIENT)
Dept: PAIN MEDICINE | Facility: CLINIC | Age: 46
End: 2021-12-23

## 2021-12-28 DIAGNOSIS — M54.2 ACUTE NECK PAIN: ICD-10-CM

## 2021-12-28 DIAGNOSIS — M54.12 CERVICAL RADICULITIS: ICD-10-CM

## 2021-12-28 DIAGNOSIS — M62.838 MUSCLE SPASM: ICD-10-CM

## 2021-12-28 DIAGNOSIS — M54.81 OCCIPITAL NEURALGIA OF LEFT SIDE: ICD-10-CM

## 2021-12-28 DIAGNOSIS — M79.18 MYOFASCIAL PAIN SYNDROME: ICD-10-CM

## 2021-12-28 RX ORDER — GABAPENTIN 300 MG/1
300 CAPSULE ORAL 2 TIMES DAILY
Qty: 60 CAPSULE | Refills: 5 | Status: SHIPPED | OUTPATIENT
Start: 2021-12-28 | End: 2022-07-27

## 2022-01-15 ENCOUNTER — OFFICE VISIT (OUTPATIENT)
Dept: URGENT CARE | Age: 47
End: 2022-01-15
Payer: COMMERCIAL

## 2022-01-15 VITALS — HEIGHT: 68 IN | WEIGHT: 160 LBS | BODY MASS INDEX: 24.25 KG/M2

## 2022-01-15 DIAGNOSIS — J01.00 ACUTE NON-RECURRENT MAXILLARY SINUSITIS: ICD-10-CM

## 2022-01-15 DIAGNOSIS — N75.0 BARTHOLIN'S GLAND CYST: ICD-10-CM

## 2022-01-15 DIAGNOSIS — R05.1 ACUTE COUGH: Primary | ICD-10-CM

## 2022-01-15 PROCEDURE — U0005 INFEC AGEN DETEC AMPLI PROBE: HCPCS

## 2022-01-15 PROCEDURE — U0003 INFECTIOUS AGENT DETECTION BY NUCLEIC ACID (DNA OR RNA); SEVERE ACUTE RESPIRATORY SYNDROME CORONAVIRUS 2 (SARS-COV-2) (CORONAVIRUS DISEASE [COVID-19]), AMPLIFIED PROBE TECHNIQUE, MAKING USE OF HIGH THROUGHPUT TECHNOLOGIES AS DESCRIBED BY CMS-2020-01-R: HCPCS

## 2022-01-15 RX ORDER — DOXYCYCLINE 100 MG/1
100 TABLET ORAL 2 TIMES DAILY
Qty: 20 TABLET | Refills: 0 | Status: SHIPPED | OUTPATIENT
Start: 2022-01-15 | End: 2022-01-25

## 2022-01-15 NOTE — LETTER
January 15, 2022     Patient: Yvette Espinosa   YOB: 1975   Date of Visit: 1/15/2022       To Whom it May Concern:    Esdras Sterling was seen in my clinic on 1/15/2022  She received a COVID/flu test and should Covid and flu tests be negative, they may return when fever free for 24 hours without the use of a fever reducing agent  If covid or flu test is positive, they may return to work on 1/18/2022, as this is 5 days from the onset of symptoms  Upon return, they must then adhere to strict masking for an additional 5 days  If you have any questions or concerns, please don't hesitate to call           Sincerely,          Sara Mcleod PA-C        CC: No Recipients

## 2022-01-16 ENCOUNTER — NURSE TRIAGE (OUTPATIENT)
Dept: OTHER | Facility: OTHER | Age: 47
End: 2022-01-16

## 2022-01-16 LAB — SARS-COV-2 RNA RESP QL NAA+PROBE: POSITIVE

## 2022-01-16 NOTE — PROGRESS NOTES
[facility]        NAME: Clair Dougherty is a 55 y o  female  : 1975    MRN: [id number]  DATE: January 15, 2022  TIME: 8:37 PM    Assessment and Plan   Acute cough [R05 1]  1  Acute cough  COVID Only -Office Collect   2  Bartholin's gland cyst  doxycycline (ADOXA) 100 MG tablet   3  Acute non-recurrent maxillary sinusitis           Patient Instructions     COVID swab collected today, test results pending  COVID test results are available between 3 and 5 days  Your results will come through [address]  Check MyChart and call if needed for test results  Quarantine guidelines discussed  OTC supplements/medications discussed  Follow-up with PCP in the next 1-2 days for re-evaluation if symptoms continue or worsen  Go to the ED if any fevers, unable to stay hydrated, abdominal pain, chest pain, shortness of breath, wheezing, chest tightness, cough or cold symptoms, new or worsening symptoms or other concerning symptoms  Take Vitamin D3 200IU daily, Vitamin C, and zinc  Rest and drink electrolyte rich fluids  Tylenol and ibuprofen as needed for fevers and aches following package dosing instructions  Congestion relief with mucinex 600mg 1 tablet every 12 hours  You can use afrin or flonase for nasal congestion as directed on their packaging  Nighttime cough relief with Mucinex DM or NyQuil  Go to the ED if you have trouble breathing or shortness of breath at rest, chest pain or pressure that lasts longer than 5 minutes, become confused or hard to wake, your lips or face are blue, you have a fever of 104°F (40°C) or higher  Follow up with Family doctor as needed for nonimproving symptoms and consideration for monoclonal antibodies should symptoms be severe or worsening  You have been given an antibiotic, which a common side effect is diarrhea  Eat yogurt, and increase daily fiber intake       Doxycycline has side effect of skin sensitivity, so avoid sunlight without using sunscreen  Chief Complaint     Chief Complaint   Patient presents with    Cough     muscle aches, fatigue, headache, sore throat, diarrhea, runny nose, congestion x 3 days  Patient is not vaccinated  No known exposure  Patient works from home but travels from home         History of Present Illness     Ozzy Garcia is a 55 y o  female presenting to the office today for upper respiratory complaints  Symptoms have been present for 3 days, and include body aches, fatigue, headache, sore throat, diarrhea, rhinorrhea, and congestion  Patient states that she also has a left vaginal labia mass that is extremely tender and causes her discomfort when she walks  She denies fevers, chills, night sweats  Review of Systems     Review of Systems   Constitutional: Positive for fatigue and fever  Negative for chills  HENT: Positive for congestion, rhinorrhea, sinus pressure and sore throat  Negative for ear pain  Respiratory: Negative for cough, shortness of breath and wheezing  Cardiovascular: Negative for chest pain and palpitations  Gastrointestinal: Negative for abdominal pain, constipation, diarrhea, nausea and vomiting  Genitourinary: Positive for pelvic pain and vaginal pain  Negative for difficulty urinating, dysuria, frequency, hematuria, vaginal bleeding and vaginal discharge  Musculoskeletal: Negative for myalgias  Neurological: Negative for headaches         Current Medications       Current Outpatient Medications:     ALPRAZolam (XANAX) 0 5 mg tablet, Take 1 tablet 3 times a day AS NEEDED FOR ANXIETY, Disp: 90 tablet, Rfl: 2    calcium carbonate-vitamin D (OSCAL-D) 500 mg-200 units per tablet, Take 1 tablet by mouth daily, Disp: , Rfl:     celecoxib (CeleBREX) 200 mg capsule, TAKE 1 CAPSULE (200 MG TOTAL) BY MOUTH 2 (TWO) TIMES A DAY AS NEEDED FOR MILD PAIN, Disp: 60 capsule, Rfl: 2    desvenlafaxine (PRISTIQ) 100 mg 24 hr tablet, Take 1 tablet (100 mg total) by mouth daily Genic brand only take 1 tab by mouth daily  , Disp: 30 tablet, Rfl: 3    gabapentin (NEURONTIN) 300 mg capsule, Take 1 capsule (300 mg total) by mouth 2 (two) times a day, Disp: 60 capsule, Rfl: 5    methocarbamol (ROBAXIN) 500 mg tablet, Take 1 tablet (500 mg total) by mouth 4 (four) times a day, Disp: 120 tablet, Rfl: 1    omeprazole (PriLOSEC) 40 MG capsule, Take 40 mg by mouth 2 (two) times a day, Disp: , Rfl:     Probiotic Product (PROBIOTIC-10 PO), Take by mouth, Disp: , Rfl:     zolpidem (AMBIEN) 10 mg tablet, Take 1 tablet (10 mg total) by mouth daily at bedtime as needed for sleep, Disp: 30 tablet, Rfl: 2    diphenhydrAMINE (BENADRYL) 50 mg capsule, Take 50 mg by mouth daily as needed (Patient not taking: Reported on 1/15/2022 ), Disp: , Rfl:     doxycycline (ADOXA) 100 MG tablet, Take 1 tablet (100 mg total) by mouth 2 (two) times a day for 10 days, Disp: 20 tablet, Rfl: 0    patient supplied medication, Place on the skin as needed CBD Creme, Disp: , Rfl:     predniSONE 10 mg tablet, 6 pills daily for 3 days, 5 for 3 days, 4 for 3 days, 3 for 3 days, 2 for 3 days, 1 for 3 days (Patient not taking: Reported on 1/15/2022 ), Disp: 63 tablet, Rfl: 0    Current Allergies     Allergies as of 01/15/2022 - Reviewed 01/15/2022   Allergen Reaction Noted    Bee pollen  07/05/2019    Bee venom  03/25/2017    Cat hair extract  03/25/2017    Diclofenac  06/06/2017    Dog epithelium  06/01/2017    Dog epithelium allergy skin test  03/25/2017    Dust mite extract  06/01/2017    Grass extracts  [gramineae pollens]  06/01/2017    Other  06/01/2017    Pollen extract  06/01/2017    Root beer flavor  03/25/2017            The following portions of the patient's history were reviewed and updated as appropriate: allergies, current medications, past family history, past medical history, past social history, past surgical history and problem list      Past Medical History:   Diagnosis Date    Anxiety     Depression     Gallbladder disorder     GERD (gastroesophageal reflux disease)     Menorrhagia     Neck pain on left side     Severe dysmenorrhea        Past Surgical History:   Procedure Laterality Date    CERVICAL BIOPSY  W/ LOOP ELECTRODE EXCISION  2006    CHOLECYSTECTOMY      WISDOM TOOTH EXTRACTION         Family History   Problem Relation Age of Onset    Lung cancer Mother     No Known Problems Father     Cancer Paternal Grandfather        Medications have been verified  Objective     Ht 5' 8" (1 727 m)   Wt 72 6 kg (160 lb)   BMI 24 33 kg/m²   No LMP recorded  Patient is premenopausal      Physical Exam     Physical Exam  Vitals reviewed  Exam conducted with a chaperone present  Constitutional:       Appearance: Normal appearance  HENT:      Head: Normocephalic and atraumatic  Right Ear: Hearing, tympanic membrane, ear canal and external ear normal  There is no impacted cerumen  Left Ear: Hearing, tympanic membrane, ear canal and external ear normal  There is no impacted cerumen  Nose: Congestion present  No rhinorrhea  Right Sinus: Maxillary sinus tenderness present  No frontal sinus tenderness  Left Sinus: Maxillary sinus tenderness present  No frontal sinus tenderness  Mouth/Throat:      Mouth: Mucous membranes are moist       Pharynx: Oropharynx is clear  Uvula midline  Posterior oropharyngeal erythema and uvula swelling present  No oropharyngeal exudate  Eyes:      General:         Right eye: No discharge  Left eye: No discharge  Extraocular Movements: Extraocular movements intact  Conjunctiva/sclera: Conjunctivae normal       Pupils: Pupils are equal, round, and reactive to light  Cardiovascular:      Rate and Rhythm: Normal rate and regular rhythm  Heart sounds: Normal heart sounds  No murmur heard  No friction rub  No gallop  Pulmonary:      Effort: Pulmonary effort is normal       Breath sounds: Normal breath sounds   No wheezing, rhonchi or rales  Genitourinary:     Exam position: Lithotomy position  Rock stage (genital): 5  Labia:         Left: Tenderness present  Comments: Internal exam was not performed  Neurological:      Mental Status: She is alert

## 2022-01-16 NOTE — TELEPHONE ENCOUNTER
Patient made aware of reason for antibiotic that was prescribed  Patient also aware she should follow up with PCP or GYN  Patient states she will call GYN tomorrow  Patient encouraged to start antibiotic, use tylenol for pain (she stated she can't use Ibuprofen), and use warm water to keep area clean

## 2022-01-16 NOTE — PATIENT INSTRUCTIONS
Take Vitamin D3 200IU daily, Vitamin C, and zinc  Rest and drink electrolyte rich fluids  Tylenol and ibuprofen as needed for fevers and aches following package dosing instructions  Congestion relief with mucinex 600mg 1 tablet every 12 hours  You can use afrin or flonase for nasal congestion as directed on their packaging  Nighttime cough relief with Mucinex DM or NyQuil  Go to the ED if you have trouble breathing or shortness of breath at rest, chest pain or pressure that lasts longer than 5 minutes, become confused or hard to wake, your lips or face are blue, you have a fever of 104°F (40°C) or higher  Follow up with Family doctor as needed for nonimproving symptoms and consideration for monoclonal antibodies should symptoms be severe or worsening  You have been given an antibiotic, which a common side effect is diarrhea  Eat yogurt, and increase daily fiber intake  Doxycycline has side effect of skin sensitivity, so avoid sunlight without using sunscreen

## 2022-01-16 NOTE — TELEPHONE ENCOUNTER
Regarding: Covid Positive, Lump on the outside of Vagina   Advise  ----- Message from Naytev sent at 1/16/2022  2:04 PM EST -----  " I am having a Couple of issues today, I was put on Doxycycline 100 mg [facility] night but I did not pick it up yet  I am Covid Positive and I have a Lump on the outside of my Vagina outer labia  This is very very Painful  I'm not sure what the Doxycycline was Prescribed for, the Lump or my Covid Symptoms  "

## 2022-01-25 ENCOUNTER — OFFICE VISIT (OUTPATIENT)
Dept: OBGYN CLINIC | Facility: CLINIC | Age: 47
End: 2022-01-25
Payer: COMMERCIAL

## 2022-01-25 VITALS
DIASTOLIC BLOOD PRESSURE: 70 MMHG | WEIGHT: 163 LBS | SYSTOLIC BLOOD PRESSURE: 130 MMHG | HEIGHT: 68 IN | BODY MASS INDEX: 24.71 KG/M2

## 2022-01-25 DIAGNOSIS — N75.1 BARTHOLIN'S GLAND ABSCESS: Primary | ICD-10-CM

## 2022-01-25 PROCEDURE — 56420 I&D BARTHOLINS GLAND ABSCESS: CPT | Performed by: STUDENT IN AN ORGANIZED HEALTH CARE EDUCATION/TRAINING PROGRAM

## 2022-01-25 PROCEDURE — 88313 SPECIAL STAINS GROUP 2: CPT | Performed by: PATHOLOGY

## 2022-01-25 PROCEDURE — 88312 SPECIAL STAINS GROUP 1: CPT | Performed by: PATHOLOGY

## 2022-01-25 PROCEDURE — 88305 TISSUE EXAM BY PATHOLOGIST: CPT | Performed by: PATHOLOGY

## 2022-01-25 PROCEDURE — 99214 OFFICE O/P EST MOD 30 MIN: CPT | Performed by: STUDENT IN AN ORGANIZED HEALTH CARE EDUCATION/TRAINING PROGRAM

## 2022-01-25 RX ORDER — OXYCODONE HYDROCHLORIDE 5 MG/1
5 TABLET ORAL EVERY 4 HOURS PRN
Qty: 5 TABLET | Refills: 0 | Status: SHIPPED | OUTPATIENT
Start: 2022-01-25 | End: 2022-01-27 | Stop reason: SDUPTHER

## 2022-01-25 RX ORDER — LIDOCAINE HYDROCHLORIDE 20 MG/ML
JELLY TOPICAL AS NEEDED
Qty: 30 ML | Refills: 2 | Status: SHIPPED | OUTPATIENT
Start: 2022-01-25 | End: 2022-01-25 | Stop reason: SDUPTHER

## 2022-01-25 RX ORDER — LIDOCAINE 40 MG/G
CREAM TOPICAL AS NEEDED
Qty: 30 G | Refills: 0 | Status: SHIPPED | OUTPATIENT
Start: 2022-01-25 | End: 2022-03-16

## 2022-01-25 RX ORDER — SULFAMETHOXAZOLE AND TRIMETHOPRIM 800; 160 MG/1; MG/1
1 TABLET ORAL EVERY 12 HOURS SCHEDULED
Qty: 6 TABLET | Refills: 0 | Status: SHIPPED | OUTPATIENT
Start: 2022-01-25 | End: 2022-01-27 | Stop reason: SDUPTHER

## 2022-01-25 NOTE — PROGRESS NOTES
[facility] [address], [address], [place], [address] [place] [address] Ave    Incision and drain    Date/Time: 1/25/2022 8:30 AM  Performed by: Shraddha Bourgeois MD  Authorized by: Shraddha Bourgeois MD   Universal Protocol:  Consent: Written consent obtained  Risks and benefits: risks, benefits and alternatives were discussed  Consent given by: patient  Time out: Immediately prior to procedure a "time out" was called to verify the correct patient, procedure, equipment, support staff and site/side marked as required  Timeout called at: 1/25/2022 8:40 AM   Patient understanding: patient states understanding of the procedure being performed  Patient consent: the patient's understanding of the procedure matches consent given  Procedure consent: procedure consent matches procedure scheduled  Relevant documents: relevant documents present and verified  Test results: test results available and properly labeled  Site marked: the operative site was marked  Required items: required blood products, implants, devices, and special equipment available  Patient identity confirmed: verbally with patient      Patient location:  Clinic  Location:     Type:  Abscess and Bartholin cyst    Location:  Anogenital    Anogenital location:  Bartholin's gland  Pre-procedure details:     Skin preparation:  Betadine  Anesthesia (see MAR for exact dosages): Anesthesia method:  Local infiltration    Local anesthetic:  Lidocaine 1% w/o epi  Procedure details:     Complexity:  Simple    Needle aspiration: no      Incision types:  Stab incision    Scalpel blade:  11    Incision depth:  Subcutaneous    Wound management:  Probed and deloculated, irrigated with saline and extensive cleaning    Drainage:  Bloody and purulent    Drainage amount:  Copious    Wound treatment:  Wound left open and drain placed    Packing materials: Word catheter  Post-procedure details:     Patient tolerance of procedure:   Tolerated well, no immediate complications  Biopsy    Date/Time: 1/25/2022 8:30 AM  Performed by: King Bassam Dhillon MD  Authorized by: Best Dhillon MD   Universal Protocol:  Consent: Written consent obtained  Risks and benefits: risks, benefits and alternatives were discussed  Consent given by: patient  Time out: Immediately prior to procedure a "time out" was called to verify the correct patient, procedure, equipment, support staff and site/side marked as required  Timeout called at: 1/25/2022 8:40 AM   Patient understanding: patient states understanding of the procedure being performed  Patient consent: the patient's understanding of the procedure matches consent given  Procedure consent: procedure consent matches procedure scheduled  Relevant documents: relevant documents present and verified  Test results: test results available and properly labeled  Required items: required blood products, implants, devices, and special equipment available  Patient identity confirmed: verbally with patient      Procedure Details - Skin Biopsy:     Biopsy tissue type: skin    Biopsy method: incisional      Body area:   Anogenital    Anogenital location:  Vulva    Vaginal Lesion: No      Malignancy: malignancy unknown      Derek Whalen MD  [address] [place] [address]  1/25/2022 9:44 AM

## 2022-01-25 NOTE — PROGRESS NOTES
Call of approval made to Cristel Funez MD  [facility]  01/25/22 10:59 AM

## 2022-01-25 NOTE — ASSESSMENT & PLAN NOTE
- We reviewed the clinical exam findings of left Bartholin gland abscess approximately 5 cm  She gave written informed consent to undergo office incision and drainage  Given her age, a vulvar biopsy of the cyst capsule was sent to exclude malignancy  - A Word Catheter was inserted  She was given detailed instructions to continue clean sitz baths, use kasie bottle, and continue warm compresses  - Given the small area of overlying erythema concerning for possible cellulitis, she was prescribed 3 days of Bactrim DS  For pain she was given topical lidocaine jelly to use prn and oxycodone 5 mg PO x 5 doses to use for pain not controlled with tylenol  She has an intolerance to ibuprofen    - She will return to the office in 7 days for interval exam and word catheter removal

## 2022-01-27 ENCOUNTER — OFFICE VISIT (OUTPATIENT)
Dept: OBGYN CLINIC | Facility: MEDICAL CENTER | Age: 47
End: 2022-01-27
Payer: COMMERCIAL

## 2022-01-27 VITALS — WEIGHT: 165 LBS | BODY MASS INDEX: 25.01 KG/M2 | HEIGHT: 68 IN

## 2022-01-27 DIAGNOSIS — N75.1 BARTHOLIN'S GLAND ABSCESS: ICD-10-CM

## 2022-01-27 PROCEDURE — 4004F PT TOBACCO SCREEN RCVD TLK: CPT | Performed by: OBSTETRICS & GYNECOLOGY

## 2022-01-27 PROCEDURE — 99024 POSTOP FOLLOW-UP VISIT: CPT | Performed by: OBSTETRICS & GYNECOLOGY

## 2022-01-27 RX ORDER — OXYCODONE HYDROCHLORIDE 5 MG/1
5 TABLET ORAL EVERY 4 HOURS PRN
Qty: 12 TABLET | Refills: 0 | Status: SHIPPED | OUTPATIENT
Start: 2022-01-27 | End: 2022-02-01

## 2022-01-27 RX ORDER — SULFAMETHOXAZOLE AND TRIMETHOPRIM 800; 160 MG/1; MG/1
1 TABLET ORAL EVERY 12 HOURS SCHEDULED
Qty: 4 TABLET | Refills: 0 | Status: SHIPPED | OUTPATIENT
Start: 2022-01-27 | End: 2022-01-29

## 2022-01-27 NOTE — PROGRESS NOTES
Assessment:  55 y o  Td Mabry who presents with interval improvement in bartholins gland abscess  Low grade temp noted overnight without recurrence  Recommend extending bactrim course x48hr  Plan:  Diagnoses and all orders for this visit:    Bartholin's gland abscess   - Offered to remove catheter/balloon  Counseled on   risk of recollection and recommend leaving in currently  Patient prefers to leave catheter currently and will follow up with Dr Ilene Alonso in 4 days  -     sulfamethoxazole-trimethoprim (BACTRIM DS) 800-160 mg per tablet; Take 1 tablet by mouth every 12 (twelve) hours for 2 days  -     oxyCODONE (Roxicodone) 5 immediate release tablet; Take 1 tablet (5 mg total) by mouth every 4 (four) hours as needed for severe pain for up to 5 days Max Daily Amount: 30 mg  - Encouraged continued judicious use of oxycodone, but may continue to require for next few days  Encouraged continued tylenol and lidocaine adjuncts, also consider dermaplast         __________________________________________________________________    Subjective   Zoey Newmary is a 55 y o  Td Mabry who presents with painful swelling around I&D site for bartholins gland abscess  Patient reports the I&D procedure was very difficult on her and afterwards for the next 24-36hr she saw "a lot of blood " Over last day, its decreased dramatically and now she largely just sees pus vs discharge  Pain has increased in intensity and she as a lump at the site that she thinks is another collection  She also ntoes when the catheter moves, it feels like a "hot poker " Reports has been feeling constitutionally well except for last night, when she had a fever to 100F and 1x vomiting  Vomiting came unexpectedly; was feeling OK, then all of a sudden needed to vomit  She has been using tylenol 1000mg q6-8hr, oxycodone q12hr, and topical lidocaine          The following portions of the patient's history were reviewed and updated as appropriate: allergies, current medications, past medical history and problem list     Review of Systems  Review of Systems   Constitutional: Positive for fever (x1, resolved)  Negative for chills and fatigue  Respiratory: Negative for cough and shortness of breath  Gastrointestinal: Positive for vomiting (x1)  Negative for abdominal distention, abdominal pain, constipation, diarrhea and nausea  Genitourinary: Positive for genital sores, vaginal bleeding, vaginal discharge and vaginal pain  Negative for menstrual problem and pelvic pain  Objective  Ht 5' 8" (1 727 m)   Wt 74 8 kg (165 lb)   BMI 25 09 kg/m²      Physical Exam:  Physical Exam  Exam conducted with a chaperone present  Constitutional:       General: She is not in acute distress  Appearance: Normal appearance  She is not ill-appearing, toxic-appearing or diaphoretic  Eyes:      General: No scleral icterus  Right eye: No discharge  Left eye: No discharge  Conjunctiva/sclera: Conjunctivae normal    Cardiovascular:      Rate and Rhythm: Normal rate  Pulmonary:      Effort: Pulmonary effort is normal  No respiratory distress  Genitourinary:     Exam position: Lithotomy position  Labia:         Right: No rash, tenderness or lesion  Left: Tenderness and injury (i&d site clean, non-erythematous  bulge appreciated and consistent with word catheter balloon  word catheter well positioned and in place  no significant drainage on exam  ) present  No rash or lesion  Musculoskeletal:         General: No swelling  Skin:     General: Skin is warm and dry  Coloration: Skin is not jaundiced or pale  Findings: No bruising or erythema  Neurological:      Mental Status: She is alert  Psychiatric:         Mood and Affect: Mood normal          Behavior: Behavior normal          Thought Content:  Thought content normal          Judgment: Judgment normal

## 2022-01-31 ENCOUNTER — OFFICE VISIT (OUTPATIENT)
Dept: OBGYN CLINIC | Facility: MEDICAL CENTER | Age: 47
End: 2022-01-31
Payer: COMMERCIAL

## 2022-01-31 VITALS
DIASTOLIC BLOOD PRESSURE: 60 MMHG | BODY MASS INDEX: 24.25 KG/M2 | WEIGHT: 160 LBS | SYSTOLIC BLOOD PRESSURE: 100 MMHG | HEIGHT: 68 IN

## 2022-01-31 DIAGNOSIS — N75.1 BARTHOLIN'S GLAND ABSCESS: Primary | ICD-10-CM

## 2022-01-31 PROCEDURE — 3008F BODY MASS INDEX DOCD: CPT | Performed by: OBSTETRICS & GYNECOLOGY

## 2022-01-31 PROCEDURE — 99024 POSTOP FOLLOW-UP VISIT: CPT | Performed by: STUDENT IN AN ORGANIZED HEALTH CARE EDUCATION/TRAINING PROGRAM

## 2022-02-01 NOTE — ASSESSMENT & PLAN NOTE
- Word catheter removed today in the office, area is well healing with no residual fluctuance or induration  - Reviewed benign surgical pathology from vulvar biopsy  - Follow up as needed

## 2022-02-01 NOTE — PROGRESS NOTES
[facility] [address] [place] [address]    Assessment/Plan:  Chad Torres is a 55 y o  [id number] who follows up for Word catheter removal 1 week after incision and drainage for bartholin gland abscess  Bartholin's gland abscess  - Word catheter removed today in the office, area is well healing with no residual fluctuance or induration  - Reviewed benign surgical pathology from vulvar biopsy  - Follow up as needed    Diagnoses and all orders for this visit:    Bartholin's gland abscess          Subjective:   Chad Torres is a 55 y o   female  CC: Bartholin gland abscess follow up    HPI: George Sainz follows up for Bartholin gland abscess  Incision and drainage was performed last week in the office with Word catheter placement  She returns to day  She has had considerable pain at the site especially when the catheter moves  She feels bloated and constipated from oxycodone and has started to take laxatives  ROS: Review of Systems   Constitutional: Negative for chills and fever  Respiratory: Negative for cough and shortness of breath  Cardiovascular: Negative for chest pain and leg swelling  Gastrointestinal: Negative for abdominal pain, nausea and vomiting  Genitourinary: Negative for dysuria, frequency and urgency  Neurological: Negative for dizziness, light-headedness and headaches  PFSH: The following portions of the patient's history were reviewed and updated as appropriate: allergies, current medications, past family history, past medical history, obstetric history, gynecologic history, past social history, past surgical history and problem list        Objective:  /60   Ht 5' 8" (1 727 m)   Wt 72 6 kg (160 lb)   BMI 24 33 kg/m²    Physical Exam  Constitutional:       Appearance: Normal appearance  HENT:      Head: Normocephalic and atraumatic  Cardiovascular:      Rate and Rhythm: Normal rate     Pulmonary:      Effort: Pulmonary effort is normal    Abdominal:      General: There is no distension  Tenderness: There is no abdominal tenderness  There is no guarding  Genitourinary:     Exam position: Lithotomy position  Pubic Area: No rash  Labia:         Right: No rash, tenderness or lesion  Left: No rash, tenderness or lesion  Urethra: No prolapse, urethral swelling or urethral lesion  Vagina: No vaginal discharge, erythema, tenderness, bleeding or lesions  Cervix: No cervical motion tenderness, discharge, friability or erythema  Comments: Word catheter removed  Incision is clean and dry without further induration, erythema, or drainage  Lymphadenopathy:      Lower Body: No right inguinal adenopathy  No left inguinal adenopathy  Neurological:      Mental Status: She is alert             Mitzi Bellamy MD  [address] [place] [address]  1/31/2022 9:08 PM

## 2022-02-02 ENCOUNTER — OFFICE VISIT (OUTPATIENT)
Dept: URGENT CARE | Age: 47
End: 2022-02-02
Payer: COMMERCIAL

## 2022-02-02 ENCOUNTER — APPOINTMENT (OUTPATIENT)
Dept: RADIOLOGY | Age: 47
End: 2022-02-02
Payer: COMMERCIAL

## 2022-02-02 VITALS
DIASTOLIC BLOOD PRESSURE: 62 MMHG | TEMPERATURE: 98.5 F | HEART RATE: 108 BPM | SYSTOLIC BLOOD PRESSURE: 134 MMHG | OXYGEN SATURATION: 97 % | RESPIRATION RATE: 20 BRPM

## 2022-02-02 DIAGNOSIS — T14.90XA INJURY: ICD-10-CM

## 2022-02-02 DIAGNOSIS — S99.922A TOE INJURY, LEFT, INITIAL ENCOUNTER: Primary | ICD-10-CM

## 2022-02-02 PROCEDURE — 99213 OFFICE O/P EST LOW 20 MIN: CPT

## 2022-02-02 PROCEDURE — 73660 X-RAY EXAM OF TOE(S): CPT

## 2022-02-02 NOTE — PROGRESS NOTES
[facility]        NAME: Henry Real is a 55 y o  female  : 1975    MRN: [id number]  DATE: 2022  TIME: 1:23 PM    Assessment and Plan   Toe injury, left, initial encounter [I32 [id number]]  1  Toe injury, left, initial encounter     2  Injury  XR toe left fifth min 2 views         Patient Instructions       Follow up with PCP in 3-5 days  Proceed to  ER if symptoms worsen  Chief Complaint     Chief Complaint   Patient presents with    Toe Injury     pt stubbed left 5th toe, toe was bleeding, nail lifted but intact         History of Present Illness       Patient presenting for evaluation of left 5th toe injury  Patient states that she stubbed her 5th toe earlier today  She states at the time of injury, her left 5th toe was bleeding  She denies any treatment for her pain at this time  She denies any previous left this toe injuries  Patient denies any numbness decreased sensation, but states that her left 5th toe feels tingly  Review of Systems   Review of Systems   Constitutional: Negative for chills and fever  HENT: Negative for ear pain and sore throat  Eyes: Negative for pain and visual disturbance  Respiratory: Negative for cough and shortness of breath  Cardiovascular: Negative for chest pain and palpitations  Gastrointestinal: Negative for abdominal pain and vomiting  Genitourinary: Negative for dysuria and hematuria  Musculoskeletal: Positive for arthralgias and joint swelling  Negative for back pain  Skin: Positive for wound  Negative for color change and rash  Neurological: Negative for syncope and headaches  All other systems reviewed and are negative          Current Medications       Current Outpatient Medications:     ALPRAZolam (XANAX) 0 5 mg tablet, Take 1 tablet 3 times a day AS NEEDED FOR ANXIETY, Disp: 90 tablet, Rfl: 2    calcium carbonate-vitamin D (OSCAL-D) 500 mg-200 units per tablet, Take 1 tablet by mouth daily, Disp: , Rfl:   celecoxib (CeleBREX) 200 mg capsule, TAKE 1 CAPSULE (200 MG TOTAL) BY MOUTH 2 (TWO) TIMES A DAY AS NEEDED FOR MILD PAIN, Disp: 60 capsule, Rfl: 2    desvenlafaxine (PRISTIQ) 100 mg 24 hr tablet, Take 1 tablet (100 mg total) by mouth daily Genic brand only take 1 tab by mouth daily  , Disp: 30 tablet, Rfl: 3    diphenhydrAMINE (BENADRYL) 50 mg capsule, Take 50 mg by mouth daily as needed (Patient not taking: Reported on 1/15/2022 ), Disp: , Rfl:     gabapentin (NEURONTIN) 300 mg capsule, Take 1 capsule (300 mg total) by mouth 2 (two) times a day, Disp: 60 capsule, Rfl: 5    lidocaine (LMX) 4 % cream, Apply topically as needed for mild pain, Disp: 30 g, Rfl: 0    methocarbamol (ROBAXIN) 500 mg tablet, Take 1 tablet (500 mg total) by mouth 4 (four) times a day, Disp: 120 tablet, Rfl: 0    omeprazole (PriLOSEC) 40 MG capsule, Take 40 mg by mouth 2 (two) times a day, Disp: , Rfl:     patient supplied medication, Place on the skin as needed CBD Creme, Disp: , Rfl:     predniSONE 10 mg tablet, 6 pills daily for 3 days, 5 for 3 days, 4 for 3 days, 3 for 3 days, 2 for 3 days, 1 for 3 days (Patient not taking: Reported on 1/15/2022 ), Disp: 63 tablet, Rfl: 0    Probiotic Product (PROBIOTIC-10 PO), Take by mouth, Disp: , Rfl:     zolpidem (AMBIEN) 10 mg tablet, Take 1 tablet (10 mg total) by mouth daily at bedtime as needed for sleep, Disp: 30 tablet, Rfl: 2    Current Allergies     Allergies as of 02/02/2022 - Reviewed 02/02/2022   Allergen Reaction Noted    Bee pollen  07/05/2019    Bee venom  03/25/2017    Cat hair extract  03/25/2017    Diclofenac  06/06/2017    Dog epithelium  06/01/2017    Dog epithelium allergy skin test  03/25/2017    Dust mite extract  06/01/2017    Grass extracts  [gramineae pollens]  06/01/2017    Other  06/01/2017    Pollen extract  06/01/2017    Root beer flavor  03/25/2017            The following portions of the patient's history were reviewed and updated as appropriate: allergies, current medications, past family history, past medical history, past social history, past surgical history and problem list      Past Medical History:   Diagnosis Date    Anxiety     Depression     Gallbladder disorder     GERD (gastroesophageal reflux disease)     Menorrhagia     Neck pain on left side     Severe dysmenorrhea        Past Surgical History:   Procedure Laterality Date    CERVICAL BIOPSY  W/ LOOP ELECTRODE EXCISION  2006    CHOLECYSTECTOMY      WISDOM TOOTH EXTRACTION         Family History   Problem Relation Age of Onset    Lung cancer Mother     No Known Problems Father     Cancer Paternal Grandfather          Medications have been verified  Objective   /62   Pulse (!) 108   Temp 98 5 °F (36 9 °C)   Resp 20   SpO2 97%        Physical Exam     Physical Exam  Vitals and nursing note reviewed  Constitutional:       General: She is not in acute distress  Appearance: Normal appearance  She is not ill-appearing  HENT:      Head: Normocephalic and atraumatic  Right Ear: Tympanic membrane normal       Left Ear: Tympanic membrane normal       Nose: No congestion or rhinorrhea  Mouth/Throat:      Mouth: Mucous membranes are moist       Pharynx: Oropharynx is clear  No oropharyngeal exudate or posterior oropharyngeal erythema  Eyes:      Extraocular Movements: Extraocular movements intact  Conjunctiva/sclera: Conjunctivae normal       Pupils: Pupils are equal, round, and reactive to light  Cardiovascular:      Rate and Rhythm: Normal rate and regular rhythm  Pulses: Normal pulses  Heart sounds: Normal heart sounds  No murmur heard  No friction rub  No gallop  Pulmonary:      Effort: No respiratory distress  Breath sounds: Normal breath sounds  No wheezing, rhonchi or rales  Abdominal:      General: Bowel sounds are normal       Palpations: Abdomen is soft  Tenderness: There is no abdominal tenderness  Musculoskeletal:         General: Swelling, tenderness and signs of injury present  Normal range of motion  Cervical back: Normal range of motion and neck supple  Right foot: Normal         Legs:    Skin:     General: Skin is warm and dry  Capillary Refill: Capillary refill takes less than 2 seconds  Neurological:      General: No focal deficit present  Mental Status: She is alert and oriented to person, place, and time  Psychiatric:         Mood and Affect: Mood normal          Behavior: Behavior normal          X-ray of right 5th toe reviewed, no acute osseous abnormality noted at this time  Awaiting final read per radiology department  Nory tape applied to patient's left 5th toe  Patient tolerated taping without any complications  Patient symptoms improved with nory tape

## 2022-02-02 NOTE — PATIENT INSTRUCTIONS
Swollen Joint   AMBULATORY CARE:   Joint swelling  may occur in one or more joints  You may have other symptoms, such as pain, tenderness, or stiffness  A swollen joint may be caused by a variety of conditions such as arthritis, pseudogout, gout, tendinitis, or injury  Seek care immediately if:   · You cannot move your joint at all  · You have severe pain that does not get better with medicine  Contact your healthcare provider if:   · You have a fever  · You have redness or warmth over the joint  · The swelling does not decrease with treatment  · You have questions or concerns about your condition or care  Treatment for a swollen joint  depends on the cause of your swollen joint  Your healthcare provider may recommend any of the following:  · Rest  your swollen joint  Avoid activities that make the swelling or pain worse  You may need to avoid putting weight on your joint while you have pain  Crutches or a walker can be used to avoid putting weight on joints in your lower body  · Apply ice  on your swollen joint for 15 to 20 minutes every hour or as directed  Use an ice pack, or put crushed ice in a plastic bag  Cover it with a towel  Ice helps prevent tissue damage and decreases swelling and pain  · Apply heat  on your swollen joint for 20 to 30 minutes every 2 hours for as many days as directed  Heat helps decrease pain  · Elevate  your swollen joint above the level of your heart as often as you can  This will help decrease swelling and pain  Prop your joint on pillows or blankets to keep it elevated comfortably  · NSAIDs , such as ibuprofen, help decrease swelling, pain, and fever  This medicine is available with or without a doctor's order  NSAIDs can cause stomach bleeding or kidney problems in certain people  If you take blood thinner medicine, always ask your healthcare provider if NSAIDs are safe for you  Always read the medicine label and follow directions      Follow up with your doctor as directed:  Write down your questions so you remember to ask them during your visits  © Copyright [employer] 2021 Information is for End User's use only and may not be sold, redistributed or otherwise used for commercial purposes  All illustrations and images included in CareNotes® are the copyrighted property of [employer]  or Nik [employer]  The above information is an  only  It is not intended as medical advice for individual conditions or treatments  Talk to your doctor, nurse or pharmacist before following any medical regimen to see if it is safe and effective for you

## 2022-03-16 ENCOUNTER — OFFICE VISIT (OUTPATIENT)
Dept: FAMILY MEDICINE CLINIC | Facility: CLINIC | Age: 47
End: 2022-03-16
Payer: COMMERCIAL

## 2022-03-16 ENCOUNTER — TELEPHONE (OUTPATIENT)
Dept: FAMILY MEDICINE CLINIC | Facility: CLINIC | Age: 47
End: 2022-03-16

## 2022-03-16 VITALS
OXYGEN SATURATION: 97 % | BODY MASS INDEX: 23.95 KG/M2 | SYSTOLIC BLOOD PRESSURE: 120 MMHG | DIASTOLIC BLOOD PRESSURE: 72 MMHG | HEART RATE: 102 BPM | TEMPERATURE: 97.7 F | HEIGHT: 68 IN | WEIGHT: 158 LBS

## 2022-03-16 DIAGNOSIS — F41.0 PANIC ATTACK: ICD-10-CM

## 2022-03-16 DIAGNOSIS — F33.9 DEPRESSION, RECURRENT (HCC): Primary | ICD-10-CM

## 2022-03-16 DIAGNOSIS — F51.01 PRIMARY INSOMNIA: ICD-10-CM

## 2022-03-16 DIAGNOSIS — F32.1 CURRENT MODERATE EPISODE OF MAJOR DEPRESSIVE DISORDER WITHOUT PRIOR EPISODE (HCC): ICD-10-CM

## 2022-03-16 PROCEDURE — 3725F SCREEN DEPRESSION PERFORMED: CPT | Performed by: FAMILY MEDICINE

## 2022-03-16 PROCEDURE — 99213 OFFICE O/P EST LOW 20 MIN: CPT | Performed by: FAMILY MEDICINE

## 2022-03-16 PROCEDURE — 4004F PT TOBACCO SCREEN RCVD TLK: CPT | Performed by: FAMILY MEDICINE

## 2022-03-16 PROCEDURE — 3008F BODY MASS INDEX DOCD: CPT | Performed by: FAMILY MEDICINE

## 2022-03-16 RX ORDER — DESVENLAFAXINE 100 MG/1
200 TABLET, EXTENDED RELEASE ORAL DAILY
Qty: 60 TABLET | Refills: 3 | Status: SHIPPED | OUTPATIENT
Start: 2022-03-16 | End: 2022-03-16

## 2022-03-16 RX ORDER — DESVENLAFAXINE 100 MG/1
200 TABLET, EXTENDED RELEASE ORAL DAILY
Qty: 60 TABLET | Refills: 3 | Status: SHIPPED | OUTPATIENT
Start: 2022-03-16 | End: 2022-07-27

## 2022-03-16 NOTE — TELEPHONE ENCOUNTER
Pharmacy called regarding needing correct dosage and directions for this med to fill for the patient    Pharmacy called stating they have two different directions on med for patient

## 2022-03-16 NOTE — PROGRESS NOTES
Assessment/Plan:  Patient Pristiq increased to 200 mg daily  Patient will use Xanax as directed  Follow-up in the 4 months       Diagnoses and all orders for this visit:    Depression, recurrent ([address] )    Primary insomnia  -     desvenlafaxine (PRISTIQ) 100 mg 24 hr tablet; Take 2 tablets (200 mg total) by mouth daily Genic brand only take 1 tab by mouth daily  Panic attack  -     desvenlafaxine (PRISTIQ) 100 mg 24 hr tablet; Take 2 tablets (200 mg total) by mouth daily Genic brand only take 1 tab by mouth daily  Current moderate episode of major depressive disorder without prior episode (HCC)  -     desvenlafaxine (PRISTIQ) 100 mg 24 hr tablet; Take 2 tablets (200 mg total) by mouth daily Genic brand only take 1 tab by mouth daily  Subjective:        Patient ID: Lucie Almonte is a 55 y o  female  Patient is here to follow-up on depression  Patient in no better but no worse  No suicidal ideation  No GI side effects noted  Patient wishes to try to increase dose  Patient is still using Xanax routinely  Patient would like to try to decrease Xanax use  The following portions of the patient's history were reviewed and updated as appropriate: allergies, current medications, past family history, past medical history, past social history, past surgical history and problem list       Review of Systems   Constitutional: Negative  HENT: Negative  Eyes: Negative  Respiratory: Negative  Cardiovascular: Negative  Gastrointestinal: Negative  Endocrine: Negative  Genitourinary: Negative  Musculoskeletal: Negative  Skin: Negative  Allergic/Immunologic: Negative  Neurological: Negative  Hematological: Negative  Psychiatric/Behavioral: Positive for dysphoric mood  Negative for suicidal ideas  The patient is nervous/anxious  Objective: Tobacco Cessation Counseling: Tobacco cessation counseling was provided   The patient is sincerely urged to quit consumption of tobacco  She is not ready to quit tobacco            /72 (BP Location: Right arm, Patient Position: Sitting, Cuff Size: Standard)   Pulse 102   Temp 97 7 °F (36 5 °C) (Tympanic)   Ht 5' 8" (1 727 m)   Wt 71 7 kg (158 lb)   SpO2 97%   BMI 24 02 kg/m²          Physical Exam  Nursing note reviewed  Constitutional:       Appearance: Normal appearance  Cardiovascular:      Rate and Rhythm: Normal rate and regular rhythm  Pulses: Normal pulses  Heart sounds: Normal heart sounds  Pulmonary:      Effort: Pulmonary effort is normal       Breath sounds: Normal breath sounds  Neurological:      Mental Status: She is alert  Psychiatric:         Behavior: Behavior normal          Thought Content:  Thought content normal          Judgment: Judgment normal       Comments: Mildly depressed

## 2022-03-30 DIAGNOSIS — M54.12 CERVICAL RADICULITIS: ICD-10-CM

## 2022-03-31 RX ORDER — METHOCARBAMOL 500 MG/1
500 TABLET, FILM COATED ORAL 4 TIMES DAILY
Qty: 120 TABLET | Refills: 0 | Status: SHIPPED | OUTPATIENT
Start: 2022-03-31 | End: 2022-04-25 | Stop reason: SDUPTHER

## 2022-04-25 DIAGNOSIS — M54.12 CERVICAL RADICULITIS: ICD-10-CM

## 2022-04-25 RX ORDER — METHOCARBAMOL 500 MG/1
500 TABLET, FILM COATED ORAL 4 TIMES DAILY
Qty: 120 TABLET | Refills: 0 | Status: SHIPPED | OUTPATIENT
Start: 2022-04-25 | End: 2022-05-23 | Stop reason: SDUPTHER

## 2022-05-23 DIAGNOSIS — M54.12 CERVICAL RADICULITIS: ICD-10-CM

## 2022-05-23 RX ORDER — METHOCARBAMOL 500 MG/1
500 TABLET, FILM COATED ORAL 4 TIMES DAILY
Qty: 120 TABLET | Refills: 0 | Status: SHIPPED | OUTPATIENT
Start: 2022-05-23 | End: 2022-06-20 | Stop reason: SDUPTHER

## 2022-05-24 DIAGNOSIS — F41.0 PANIC ATTACK: ICD-10-CM

## 2022-05-24 DIAGNOSIS — F51.01 PRIMARY INSOMNIA: ICD-10-CM

## 2022-05-25 RX ORDER — ZOLPIDEM TARTRATE 10 MG/1
10 TABLET ORAL
Qty: 30 TABLET | Refills: 2 | Status: SHIPPED | OUTPATIENT
Start: 2022-05-25

## 2022-05-25 RX ORDER — ALPRAZOLAM 0.5 MG/1
TABLET ORAL
Qty: 90 TABLET | Refills: 2 | Status: SHIPPED | OUTPATIENT
Start: 2022-05-25

## 2022-05-31 NOTE — TELEPHONE ENCOUNTER
Pharmacist called to see if you can let her refill pt meds few days earlier , pt going on vacation and would like to take with her before she leaves Received request via: Pharmacy    Was the patient seen in the last year in this department? No    Does the patient have an active prescription (recently filled or refills available) for medication(s) requested? No

## 2022-07-27 ENCOUNTER — OFFICE VISIT (OUTPATIENT)
Dept: FAMILY MEDICINE CLINIC | Facility: CLINIC | Age: 47
End: 2022-07-27
Payer: COMMERCIAL

## 2022-07-27 VITALS
BODY MASS INDEX: 23.64 KG/M2 | DIASTOLIC BLOOD PRESSURE: 80 MMHG | WEIGHT: 156 LBS | OXYGEN SATURATION: 100 % | TEMPERATURE: 98.7 F | SYSTOLIC BLOOD PRESSURE: 116 MMHG | HEIGHT: 68 IN | RESPIRATION RATE: 18 BRPM | HEART RATE: 87 BPM

## 2022-07-27 DIAGNOSIS — F32.1 CURRENT MODERATE EPISODE OF MAJOR DEPRESSIVE DISORDER WITHOUT PRIOR EPISODE (HCC): ICD-10-CM

## 2022-07-27 DIAGNOSIS — F51.01 PRIMARY INSOMNIA: ICD-10-CM

## 2022-07-27 DIAGNOSIS — F41.0 PANIC ATTACK: ICD-10-CM

## 2022-07-27 PROCEDURE — 99213 OFFICE O/P EST LOW 20 MIN: CPT | Performed by: FAMILY MEDICINE

## 2022-07-27 RX ORDER — DESVENLAFAXINE 100 MG/1
TABLET, EXTENDED RELEASE ORAL
Qty: 60 TABLET | Refills: 3 | Status: SHIPPED | OUTPATIENT
Start: 2022-07-27 | End: 2022-07-27

## 2022-07-27 RX ORDER — DESVENLAFAXINE 25 MG/1
TABLET, EXTENDED RELEASE ORAL
Qty: 30 TABLET | Refills: 0 | Status: SHIPPED | OUTPATIENT
Start: 2022-07-27 | End: 2022-08-22 | Stop reason: SDUPTHER

## 2022-07-27 NOTE — PROGRESS NOTES
Assessment/Plan:  Patient will wean off Pristiq and start Trintellix as directed  Follow-up in 6-8 weeks       Diagnoses and all orders for this visit:    Primary insomnia    Panic attack  -     Desvenlafaxine Succinate ER (Pristiq) 25 MG TB24; 2 tablets daily for 1 week then 1 tablet daily  -     Vortioxetine HBr (Trintellix) 5 MG tablet; Take 1 tablet (5 mg total) by mouth daily    Current moderate episode of major depressive disorder without prior episode (HCC)  -     Desvenlafaxine Succinate ER (Pristiq) 25 MG TB24; 2 tablets daily for 1 week then 1 tablet daily  -     Vortioxetine HBr (Trintellix) 5 MG tablet; Take 1 tablet (5 mg total) by mouth daily            Subjective:        Patient ID: Chris Del Rosario is a 55 y o  female  Patient is here to follow-up on depression as well as anxiety  Patient feels as though increased dose of Pristiq has not helped mental state  Patient more fatigued on 2 pills daily  Patient wishes to try Trintellix  No homicidal suicidal ideation  The following portions of the patient's history were reviewed and updated as appropriate: allergies, current medications, past family history, past medical history, past social history, past surgical history and problem list       Review of Systems   Constitutional: Negative  HENT: Negative  Eyes: Negative  Respiratory: Negative  Cardiovascular: Negative  Gastrointestinal: Negative  Endocrine: Negative  Genitourinary: Negative  Musculoskeletal: Negative  Skin: Negative  Allergic/Immunologic: Negative  Neurological: Negative  Hematological: Negative  Psychiatric/Behavioral: Positive for dysphoric mood  The patient is nervous/anxious              Objective:               /80 (BP Location: Right arm, Patient Position: Sitting, Cuff Size: Standard)   Pulse 87   Temp 98 7 °F (37 1 °C)   Resp 18   Ht 5' 8" (1 727 m)   Wt 70 8 kg (156 lb)   SpO2 100%   BMI 23 72 kg/m² Physical Exam  Vitals and nursing note reviewed  Constitutional:       Appearance: Normal appearance  Cardiovascular:      Rate and Rhythm: Normal rate and regular rhythm  Pulses: Normal pulses  Heart sounds: Normal heart sounds  Pulmonary:      Effort: Pulmonary effort is normal       Breath sounds: Normal breath sounds  Neurological:      Mental Status: She is alert

## 2022-08-01 ENCOUNTER — TELEPHONE (OUTPATIENT)
Dept: FAMILY MEDICINE CLINIC | Facility: CLINIC | Age: 47
End: 2022-08-01

## 2022-08-01 NOTE — TELEPHONE ENCOUNTER
Patient call in today or PA on medication to toni of of Pristiq to start taking Trintellix 5 mg tabs REQ# [id number]; awaiting decision and patient was notifty and stated that she only has two tablets left of Prisitiq

## 2022-08-10 DIAGNOSIS — F41.0 PANIC ATTACK: ICD-10-CM

## 2022-08-10 DIAGNOSIS — F32.1 CURRENT MODERATE EPISODE OF MAJOR DEPRESSIVE DISORDER WITHOUT PRIOR EPISODE (HCC): Primary | ICD-10-CM

## 2022-08-10 NOTE — TELEPHONE ENCOUNTER
Patient called in stating she was being weened off medication and also changed to another medication but is still waiting for a PA to be approved and she is wondering if a script can be sent in until she gets the approval

## 2022-08-11 RX ORDER — DESVENLAFAXINE 100 MG/1
TABLET, EXTENDED RELEASE ORAL
Qty: 30 TABLET | Refills: 0 | Status: SHIPPED | OUTPATIENT
Start: 2022-08-11 | End: 2022-10-17 | Stop reason: ALTCHOICE

## 2022-10-12 PROBLEM — Z00.00 WELL ADULT EXAM: Status: RESOLVED | Noted: 2021-05-06 | Resolved: 2022-10-12

## 2022-10-17 ENCOUNTER — OFFICE VISIT (OUTPATIENT)
Dept: FAMILY MEDICINE CLINIC | Facility: CLINIC | Age: 47
End: 2022-10-17
Payer: COMMERCIAL

## 2022-10-17 VITALS
TEMPERATURE: 98.1 F | OXYGEN SATURATION: 96 % | DIASTOLIC BLOOD PRESSURE: 76 MMHG | SYSTOLIC BLOOD PRESSURE: 124 MMHG | HEART RATE: 76 BPM | HEIGHT: 68 IN | BODY MASS INDEX: 24.46 KG/M2 | WEIGHT: 161.4 LBS

## 2022-10-17 DIAGNOSIS — F41.0 PANIC ATTACK: Primary | ICD-10-CM

## 2022-10-17 DIAGNOSIS — F32.1 CURRENT MODERATE EPISODE OF MAJOR DEPRESSIVE DISORDER WITHOUT PRIOR EPISODE (HCC): ICD-10-CM

## 2022-10-17 PROCEDURE — 99213 OFFICE O/P EST LOW 20 MIN: CPT | Performed by: FAMILY MEDICINE

## 2022-10-17 NOTE — PROGRESS NOTES
Assessment/Plan:  Patient will continue with current regimen for anxiety and depression  Patient will solution wean Xanax as directed  Diagnoses and all orders for this visit:    Panic attack    Current moderate episode of major depressive disorder without prior episode ([facility] [address] )            Subjective:        Patient ID: Ozzy Garcia is a 55 y o  female  Patient is here follow-up on anxiety and depression  Patient feeling better on Trintellix  Patient has some anxiety issues  The following portions of the patient's history were reviewed and updated as appropriate: allergies, current medications, past family history, past medical history, past social history, past surgical history and problem list       Review of Systems   Constitutional: Negative  HENT: Negative  Eyes: Negative  Respiratory: Negative  Cardiovascular: Negative  Gastrointestinal: Negative  Endocrine: Negative  Genitourinary: Negative  Musculoskeletal: Negative  Skin: Negative  Allergic/Immunologic: Negative  Neurological: Negative  Hematological: Negative  Psychiatric/Behavioral: The patient is nervous/anxious  Objective:               /76 (BP Location: Right arm, Patient Position: Sitting, Cuff Size: Standard)   Pulse 76   Temp 98 1 °F (36 7 °C) (Temporal)   Ht 5' 8" (1 727 m)   Wt 73 2 kg (161 lb 6 4 oz)   SpO2 96%   BMI 24 54 kg/m²          Physical Exam  Vitals and nursing note reviewed  Constitutional:       General: She is not in acute distress  Appearance: Normal appearance  She is not ill-appearing, toxic-appearing or diaphoretic  HENT:      Head: Normocephalic and atraumatic  Right Ear: Tympanic membrane, ear canal and external ear normal  There is no impacted cerumen  Left Ear: Tympanic membrane, ear canal and external ear normal  There is no impacted cerumen  Nose: Nose normal  No congestion or rhinorrhea        Mouth/Throat:      Mouth: Mucous membranes are moist       Pharynx: No oropharyngeal exudate or posterior oropharyngeal erythema  Eyes:      General: No scleral icterus  Right eye: No discharge  Left eye: No discharge  Extraocular Movements: Extraocular movements intact  Conjunctiva/sclera: Conjunctivae normal       Pupils: Pupils are equal, round, and reactive to light  Neck:      Vascular: No carotid bruit  Cardiovascular:      Rate and Rhythm: Normal rate and regular rhythm  Pulses: Normal pulses  Heart sounds: Normal heart sounds  No murmur heard  No friction rub  No gallop  Pulmonary:      Effort: Pulmonary effort is normal  No respiratory distress  Breath sounds: Normal breath sounds  No stridor  No wheezing, rhonchi or rales  Chest:      Chest wall: No tenderness  Musculoskeletal:         General: No swelling, tenderness, deformity or signs of injury  Normal range of motion  Cervical back: Normal range of motion and neck supple  No rigidity  No muscular tenderness  Right lower leg: No edema  Left lower leg: No edema  Lymphadenopathy:      Cervical: No cervical adenopathy  Skin:     General: Skin is warm and dry  Capillary Refill: Capillary refill takes less than 2 seconds  Coloration: Skin is not jaundiced  Findings: No bruising, erythema, lesion or rash  Neurological:      Mental Status: She is alert and oriented to person, place, and time  Mental status is at baseline  Cranial Nerves: No cranial nerve deficit  Sensory: No sensory deficit  Motor: No weakness  Coordination: Coordination normal       Gait: Gait normal    Psychiatric:         Mood and Affect: Mood normal          Behavior: Behavior normal          Thought Content:  Thought content normal          Judgment: Judgment normal

## 2022-11-20 DIAGNOSIS — F32.1 CURRENT MODERATE EPISODE OF MAJOR DEPRESSIVE DISORDER WITHOUT PRIOR EPISODE (HCC): ICD-10-CM

## 2022-11-20 DIAGNOSIS — F41.0 PANIC ATTACK: ICD-10-CM

## 2022-11-21 RX ORDER — VORTIOXETINE 5 MG/1
TABLET, FILM COATED ORAL
Qty: 30 TABLET | Refills: 2 | Status: SHIPPED | OUTPATIENT
Start: 2022-11-21

## 2022-12-07 DIAGNOSIS — M54.12 CERVICAL RADICULITIS: ICD-10-CM

## 2022-12-07 RX ORDER — METHOCARBAMOL 500 MG/1
500 TABLET, FILM COATED ORAL 4 TIMES DAILY
Qty: 120 TABLET | Refills: 0 | Status: SHIPPED | OUTPATIENT
Start: 2022-12-07

## 2023-01-05 DIAGNOSIS — M54.12 CERVICAL RADICULITIS: ICD-10-CM

## 2023-01-05 RX ORDER — METHOCARBAMOL 500 MG/1
500 TABLET, FILM COATED ORAL 4 TIMES DAILY
Qty: 120 TABLET | Refills: 0 | Status: SHIPPED | OUTPATIENT
Start: 2023-01-05

## 2023-01-29 DIAGNOSIS — M54.12 CERVICAL RADICULITIS: ICD-10-CM

## 2023-01-29 DIAGNOSIS — F41.0 PANIC ATTACK: ICD-10-CM

## 2023-01-29 DIAGNOSIS — F51.01 PRIMARY INSOMNIA: ICD-10-CM

## 2023-01-31 RX ORDER — ALPRAZOLAM 0.5 MG/1
TABLET ORAL
Qty: 90 TABLET | Refills: 0 | Status: SHIPPED | OUTPATIENT
Start: 2023-01-31

## 2023-01-31 RX ORDER — METHOCARBAMOL 500 MG/1
500 TABLET, FILM COATED ORAL 4 TIMES DAILY
Qty: 120 TABLET | Refills: 0 | Status: SHIPPED | OUTPATIENT
Start: 2023-01-31

## 2023-01-31 RX ORDER — ZOLPIDEM TARTRATE 10 MG/1
10 TABLET ORAL
Qty: 30 TABLET | Refills: 0 | Status: SHIPPED | OUTPATIENT
Start: 2023-01-31

## 2023-02-15 DIAGNOSIS — F41.0 PANIC ATTACK: ICD-10-CM

## 2023-02-15 DIAGNOSIS — F32.1 CURRENT MODERATE EPISODE OF MAJOR DEPRESSIVE DISORDER WITHOUT PRIOR EPISODE (HCC): ICD-10-CM

## 2023-02-20 ENCOUNTER — OFFICE VISIT (OUTPATIENT)
Dept: FAMILY MEDICINE CLINIC | Facility: CLINIC | Age: 48
End: 2023-02-20

## 2023-02-20 VITALS
BODY MASS INDEX: 25.58 KG/M2 | SYSTOLIC BLOOD PRESSURE: 120 MMHG | OXYGEN SATURATION: 100 % | TEMPERATURE: 97.7 F | HEIGHT: 68 IN | WEIGHT: 168.8 LBS | HEART RATE: 99 BPM | DIASTOLIC BLOOD PRESSURE: 78 MMHG

## 2023-02-20 DIAGNOSIS — F33.9 DEPRESSION, RECURRENT (HCC): ICD-10-CM

## 2023-02-20 DIAGNOSIS — K21.00 GASTROESOPHAGEAL REFLUX DISEASE WITH ESOPHAGITIS WITHOUT HEMORRHAGE: ICD-10-CM

## 2023-02-20 DIAGNOSIS — F41.0 PANIC ATTACK: Primary | ICD-10-CM

## 2023-02-20 DIAGNOSIS — Z12.31 ENCOUNTER FOR SCREENING MAMMOGRAM FOR MALIGNANT NEOPLASM OF BREAST: ICD-10-CM

## 2023-02-20 RX ORDER — ALPRAZOLAM 0.5 MG/1
0.5 TABLET ORAL 2 TIMES DAILY PRN
Qty: 60 TABLET | Refills: 3 | Status: SHIPPED | OUTPATIENT
Start: 2023-02-20

## 2023-02-20 RX ORDER — OMEPRAZOLE 40 MG/1
40 CAPSULE, DELAYED RELEASE ORAL 2 TIMES DAILY
Qty: 60 CAPSULE | Refills: 3 | Status: SHIPPED | OUTPATIENT
Start: 2023-02-20

## 2023-02-20 NOTE — PROGRESS NOTES
Assessment/Plan: Labs and records reviewed  Patient has Trintellix increased to 10 mg daily  Patient will decrease Xanax to twice daily as directed  Refills given at this time  Patient will continue with omeprazole for GERD related symptoms  Refills given at this time  Follow-up in 4 months  Diagnoses and all orders for this visit:    Panic attack  -     vortioxetine (Trintellix) 10 MG tablet; Take 1 tablet (10 mg total) by mouth daily  -     ALPRAZolam (XANAX) 0 5 mg tablet; Take 1 tablet (0 5 mg total) by mouth 2 (two) times a day as needed for anxiety    Encounter for screening mammogram for malignant neoplasm of breast  -     Mammo screening bilateral w 3d & cad; Future    Depression, recurrent (HCC)  -     vortioxetine (Trintellix) 10 MG tablet; Take 1 tablet (10 mg total) by mouth daily    Gastroesophageal reflux disease with esophagitis without hemorrhage  -     omeprazole (PriLOSEC) 40 MG capsule; Take 1 capsule (40 mg total) by mouth 2 (two) times a day            Subjective:        Patient ID: Danae Barnett is a 52 y o  female  Patient is here to follow-up on anxiety as well as depression  Patient has been doing very well overall  Patient has cut down Xanax to roughly 2 times daily with occasional third dose once weekly  Patient wishes to try to increase Trintellix at this time  No homicidal suicidal ideation  Patient needs refill for GERD medication        The following portions of the patient's history were reviewed and updated as appropriate: allergies, current medications, past family history, past medical history, past social history, past surgical history and problem list       Review of Systems   Constitutional: Negative  HENT: Negative  Eyes: Negative  Respiratory: Negative  Cardiovascular: Negative  Gastrointestinal: Negative  Endocrine: Negative  Genitourinary: Negative  Musculoskeletal: Negative  Skin: Negative      Allergic/Immunologic: Negative  Neurological: Negative  Hematological: Negative  Psychiatric/Behavioral: Negative  Objective:      BMI Counseling: Body mass index is 25 67 kg/m²  The BMI is above normal  Nutrition recommendations include consuming healthier snacks  Exercise recommendations include exercising 3-5 times per week  Rationale for BMI follow-up plan is due to patient being overweight or obese  Tobacco Cessation Counseling: Tobacco cessation counseling was provided  The patient is sincerely urged to quit consumption of tobacco  She is not ready to quit tobacco              /78 (BP Location: Right arm, Patient Position: Sitting, Cuff Size: Standard)   Pulse 99   Temp 97 7 °F (36 5 °C) (Temporal)   Ht 5' 8" (1 727 m)   Wt 76 6 kg (168 lb 12 8 oz)   SpO2 100%   BMI 25 67 kg/m²          Physical Exam  Vitals and nursing note reviewed  Constitutional:       General: She is not in acute distress  Appearance: Normal appearance  She is not ill-appearing, toxic-appearing or diaphoretic  HENT:      Head: Normocephalic and atraumatic  Nose: Nose normal  No rhinorrhea  Eyes:      General: No scleral icterus  Right eye: No discharge  Left eye: No discharge  Extraocular Movements: Extraocular movements intact  Conjunctiva/sclera: Conjunctivae normal       Pupils: Pupils are equal, round, and reactive to light  Neck:      Vascular: No carotid bruit  Cardiovascular:      Rate and Rhythm: Normal rate and regular rhythm  Pulses: Normal pulses  Heart sounds: Normal heart sounds  No murmur heard  No friction rub  No gallop  Pulmonary:      Effort: Pulmonary effort is normal  No respiratory distress  Breath sounds: Normal breath sounds  No stridor  No wheezing, rhonchi or rales  Chest:      Chest wall: No tenderness  Musculoskeletal:         General: No swelling, tenderness, deformity or signs of injury  Normal range of motion        Cervical back: Normal range of motion and neck supple  No rigidity  No muscular tenderness  Right lower leg: No edema  Left lower leg: No edema  Lymphadenopathy:      Cervical: No cervical adenopathy  Skin:     General: Skin is warm and dry  Capillary Refill: Capillary refill takes less than 2 seconds  Coloration: Skin is not jaundiced  Findings: No bruising, erythema, lesion or rash  Neurological:      Mental Status: She is alert and oriented to person, place, and time  Mental status is at baseline  Cranial Nerves: No cranial nerve deficit  Sensory: No sensory deficit  Motor: No weakness  Coordination: Coordination normal       Gait: Gait normal    Psychiatric:         Mood and Affect: Mood normal          Behavior: Behavior normal          Thought Content:  Thought content normal          Judgment: Judgment normal

## 2023-03-01 DIAGNOSIS — M54.12 CERVICAL RADICULITIS: ICD-10-CM

## 2023-03-01 RX ORDER — METHOCARBAMOL 500 MG/1
500 TABLET, FILM COATED ORAL 4 TIMES DAILY
Qty: 120 TABLET | Refills: 0 | Status: SHIPPED | OUTPATIENT
Start: 2023-03-01

## 2023-04-24 DIAGNOSIS — M54.12 CERVICAL RADICULITIS: ICD-10-CM

## 2023-04-24 RX ORDER — METHOCARBAMOL 500 MG/1
500 TABLET, FILM COATED ORAL 4 TIMES DAILY
Qty: 120 TABLET | Refills: 0 | Status: SHIPPED | OUTPATIENT
Start: 2023-04-24

## 2023-04-26 ENCOUNTER — HOSPITAL ENCOUNTER (OUTPATIENT)
Dept: RADIOLOGY | Facility: IMAGING CENTER | Age: 48
Discharge: HOME | End: 2023-04-26

## 2023-04-26 VITALS — HEIGHT: 68 IN | BODY MASS INDEX: 25.01 KG/M2 | WEIGHT: 165 LBS

## 2023-04-26 DIAGNOSIS — Z12.31 ENCOUNTER FOR SCREENING MAMMOGRAM FOR MALIGNANT NEOPLASM OF BREAST: ICD-10-CM

## 2023-05-24 DIAGNOSIS — K21.00 GASTROESOPHAGEAL REFLUX DISEASE WITH ESOPHAGITIS WITHOUT HEMORRHAGE: ICD-10-CM

## 2023-05-24 DIAGNOSIS — F41.0 PANIC ATTACK: ICD-10-CM

## 2023-05-24 DIAGNOSIS — M54.12 CERVICAL RADICULITIS: ICD-10-CM

## 2023-05-24 DIAGNOSIS — F51.01 PRIMARY INSOMNIA: ICD-10-CM

## 2023-05-24 DIAGNOSIS — F33.9 DEPRESSION, RECURRENT (HCC): ICD-10-CM

## 2023-05-24 RX ORDER — METHOCARBAMOL 500 MG/1
500 TABLET, FILM COATED ORAL 4 TIMES DAILY
Qty: 120 TABLET | Refills: 0 | Status: SHIPPED | OUTPATIENT
Start: 2023-05-24

## 2023-05-25 RX ORDER — ZOLPIDEM TARTRATE 10 MG/1
10 TABLET ORAL
Qty: 30 TABLET | Refills: 0 | Status: SHIPPED | OUTPATIENT
Start: 2023-05-25

## 2023-05-25 RX ORDER — OMEPRAZOLE 40 MG/1
40 CAPSULE, DELAYED RELEASE ORAL 2 TIMES DAILY
Qty: 60 CAPSULE | Refills: 0 | Status: SHIPPED | OUTPATIENT
Start: 2023-05-25

## 2023-05-25 RX ORDER — ALPRAZOLAM 0.5 MG/1
0.5 TABLET ORAL 2 TIMES DAILY PRN
Qty: 60 TABLET | Refills: 0 | Status: SHIPPED | OUTPATIENT
Start: 2023-05-25

## 2023-06-19 ENCOUNTER — OFFICE VISIT (OUTPATIENT)
Dept: FAMILY MEDICINE CLINIC | Facility: CLINIC | Age: 48
End: 2023-06-19
Payer: COMMERCIAL

## 2023-06-19 VITALS
SYSTOLIC BLOOD PRESSURE: 98 MMHG | TEMPERATURE: 98.7 F | OXYGEN SATURATION: 99 % | HEIGHT: 68 IN | HEART RATE: 95 BPM | DIASTOLIC BLOOD PRESSURE: 60 MMHG | WEIGHT: 169.2 LBS | BODY MASS INDEX: 25.64 KG/M2

## 2023-06-19 DIAGNOSIS — M79.18 MYOFASCIAL PAIN SYNDROME: ICD-10-CM

## 2023-06-19 DIAGNOSIS — M54.12 CERVICAL RADICULITIS: ICD-10-CM

## 2023-06-19 DIAGNOSIS — R82.4 KETONURIA: ICD-10-CM

## 2023-06-19 DIAGNOSIS — K21.00 GASTROESOPHAGEAL REFLUX DISEASE WITH ESOPHAGITIS WITHOUT HEMORRHAGE: ICD-10-CM

## 2023-06-19 DIAGNOSIS — R80.1 PERSISTENT PROTEINURIA: ICD-10-CM

## 2023-06-19 DIAGNOSIS — F51.01 PRIMARY INSOMNIA: ICD-10-CM

## 2023-06-19 DIAGNOSIS — F33.9 DEPRESSION, RECURRENT (HCC): ICD-10-CM

## 2023-06-19 DIAGNOSIS — F41.0 PANIC ATTACK: ICD-10-CM

## 2023-06-19 LAB
BILIRUB UR QL STRIP: NEGATIVE
CLARITY UR: CLEAR
COLOR UR: YELLOW
GLUCOSE UR STRIP-MCNC: NEGATIVE MG/DL
HGB UR QL STRIP.AUTO: NEGATIVE
KETONES UR STRIP-MCNC: NEGATIVE MG/DL
LEUKOCYTE ESTERASE UR QL STRIP: NEGATIVE
NITRITE UR QL STRIP: NEGATIVE
PH UR STRIP.AUTO: 7 [PH]
PROT UR STRIP-MCNC: ABNORMAL MG/DL
SP GR UR STRIP.AUTO: 1.03 (ref 1–1.03)
UROBILINOGEN UR STRIP-ACNC: 2 MG/DL

## 2023-06-19 PROCEDURE — 87086 URINE CULTURE/COLONY COUNT: CPT | Performed by: FAMILY MEDICINE

## 2023-06-19 PROCEDURE — 81001 URINALYSIS AUTO W/SCOPE: CPT | Performed by: FAMILY MEDICINE

## 2023-06-19 PROCEDURE — 99214 OFFICE O/P EST MOD 30 MIN: CPT | Performed by: FAMILY MEDICINE

## 2023-06-19 RX ORDER — ALPRAZOLAM 0.5 MG/1
0.5 TABLET ORAL 2 TIMES DAILY PRN
Qty: 60 TABLET | Refills: 3 | Status: SHIPPED | OUTPATIENT
Start: 2023-06-19

## 2023-06-19 RX ORDER — OMEPRAZOLE 40 MG/1
40 CAPSULE, DELAYED RELEASE ORAL 2 TIMES DAILY
Qty: 60 CAPSULE | Refills: 3 | Status: SHIPPED | OUTPATIENT
Start: 2023-06-19

## 2023-06-19 RX ORDER — METHOCARBAMOL 500 MG/1
500 TABLET, FILM COATED ORAL 4 TIMES DAILY
Qty: 120 TABLET | Refills: 3 | Status: SHIPPED | OUTPATIENT
Start: 2023-06-19

## 2023-06-19 RX ORDER — ARIPIPRAZOLE 5 MG/1
5 TABLET ORAL DAILY
Qty: 30 TABLET | Refills: 3 | Status: SHIPPED | OUTPATIENT
Start: 2023-06-19

## 2023-06-19 RX ORDER — ZOLPIDEM TARTRATE 10 MG/1
10 TABLET ORAL
Qty: 30 TABLET | Refills: 3 | Status: SHIPPED | OUTPATIENT
Start: 2023-06-19

## 2023-06-19 NOTE — PROGRESS NOTES
Assessment/Plan:       Diagnoses and all orders for this visit:    Primary insomnia  -     zolpidem (AMBIEN) 10 mg tablet; Take 1 tablet (10 mg total) by mouth daily at bedtime as needed for sleep  -     CBC and differential; Future  -     Comprehensive metabolic panel; Future  -     Hemoglobin A1C; Future  -     Lipid panel; Future  -     TSH, 3rd generation with Free T4 reflex; Future  -     ARIPiprazole (ABILIFY) 5 mg tablet; Take 1 tablet (5 mg total) by mouth daily    Panic attack  -     ALPRAZolam (XANAX) 0 5 mg tablet; Take 1 tablet (0 5 mg total) by mouth 2 (two) times a day as needed for anxiety  -     vortioxetine (Trintellix) 10 MG tablet; Take 1 tablet (10 mg total) by mouth daily  -     CBC and differential; Future  -     Comprehensive metabolic panel; Future  -     Hemoglobin A1C; Future  -     Lipid panel; Future  -     TSH, 3rd generation with Free T4 reflex; Future  -     ARIPiprazole (ABILIFY) 5 mg tablet; Take 1 tablet (5 mg total) by mouth daily    Depression, recurrent (HCC)  -     vortioxetine (Trintellix) 10 MG tablet; Take 1 tablet (10 mg total) by mouth daily  -     CBC and differential; Future  -     Comprehensive metabolic panel; Future  -     Hemoglobin A1C; Future  -     Lipid panel; Future  -     TSH, 3rd generation with Free T4 reflex; Future  -     ARIPiprazole (ABILIFY) 5 mg tablet; Take 1 tablet (5 mg total) by mouth daily    Cervical radiculitis  -     methocarbamol (ROBAXIN) 500 mg tablet; Take 1 tablet (500 mg total) by mouth 4 (four) times a day    Gastroesophageal reflux disease with esophagitis without hemorrhage  -     omeprazole (PriLOSEC) 40 MG capsule; Take 1 capsule (40 mg total) by mouth 2 (two) times a day    Myofascial pain syndrome  -     CBC and differential; Future  -     Comprehensive metabolic panel; Future  -     Hemoglobin A1C; Future  -     Lipid panel; Future  -     TSH, 3rd generation with Free T4 reflex; Future  -     ARIPiprazole (ABILIFY) 5 mg tablet;  Take 1 tablet (5 mg total) by mouth daily    Persistent proteinuria  -     CBC and differential; Future  -     Comprehensive metabolic panel; Future  -     Hemoglobin A1C; Future  -     Lipid panel; Future  -     TSH, 3rd generation with Free T4 reflex; Future  -     ARIPiprazole (ABILIFY) 5 mg tablet; Take 1 tablet (5 mg total) by mouth daily    Ketonuria  -     CBC and differential; Future  -     Comprehensive metabolic panel; Future  -     Hemoglobin A1C; Future  -     Lipid panel; Future  -     TSH, 3rd generation with Free T4 reflex; Future  -     ARIPiprazole (ABILIFY) 5 mg tablet; Take 1 tablet (5 mg total) by mouth daily    Other orders  -     Probiotic Product (PROBIOTIC DAILY PO)  -     MAGNESIUM MALATE PO  -     Cyanocobalamin (VITAMIN B-12 PO); 5,000 mg  -     Pyridoxine HCl (VITAMIN B-6 PO); 50 mg            Subjective:        Patient ID: Tom Marsh is a 52 y o  female  Patient here to follow-up on myofascial pain, insomnia anxiety depression  Patient did have urine sample done which shows some protein as well as ketones and bilirubin  Urination is normal overall  No hematuria  No significant suprapubic pain  No nausea vomiting fevers associated with this  Mood is fluctuating  No suicidal ideation  Patient having difficulty staying asleep and falling asleep  The following portions of the patient's history were reviewed and updated as appropriate: allergies, current medications, past family history, past medical history, past social history, past surgical history and problem list       Review of Systems   Constitutional: Negative  HENT: Negative  Eyes: Negative  Respiratory: Negative  Cardiovascular: Negative  Gastrointestinal:        GERD   Endocrine: Negative  Genitourinary: Negative  Musculoskeletal: Positive for arthralgias  Skin: Negative  Allergic/Immunologic: Negative  Neurological: Negative  Hematological: Negative      Psychiatric/Behavioral: "Positive for dysphoric mood and sleep disturbance  Negative for suicidal ideas  The patient is nervous/anxious  Objective:               BP 98/60 (BP Location: Right arm, Patient Position: Sitting, Cuff Size: Standard)   Pulse 95   Temp 98 7 °F (37 1 °C) (Temporal)   Ht 5' 8\" (1 727 m)   Wt 76 7 kg (169 lb 3 2 oz)   LMP 12/06/2016   SpO2 99%   BMI 25 73 kg/m²          Physical Exam  Vitals and nursing note reviewed  Constitutional:       General: She is not in acute distress  Appearance: Normal appearance  She is not ill-appearing, toxic-appearing or diaphoretic  HENT:      Head: Normocephalic and atraumatic  Right Ear: Tympanic membrane, ear canal and external ear normal  There is no impacted cerumen  Left Ear: Tympanic membrane, ear canal and external ear normal  There is no impacted cerumen  Nose: Nose normal  No congestion or rhinorrhea  Mouth/Throat:      Mouth: Mucous membranes are moist       Pharynx: No oropharyngeal exudate or posterior oropharyngeal erythema  Neck:      Vascular: No carotid bruit  Cardiovascular:      Rate and Rhythm: Normal rate and regular rhythm  Pulses: Normal pulses  Heart sounds: Normal heart sounds  No murmur heard  No friction rub  No gallop  Pulmonary:      Effort: Pulmonary effort is normal  No respiratory distress  Breath sounds: Normal breath sounds  No stridor  No wheezing, rhonchi or rales  Chest:      Chest wall: No tenderness  Musculoskeletal:         General: No swelling, tenderness, deformity or signs of injury  Normal range of motion  Cervical back: Normal range of motion and neck supple  No rigidity  No muscular tenderness  Right lower leg: No edema  Left lower leg: No edema  Lymphadenopathy:      Cervical: No cervical adenopathy  Skin:     General: Skin is warm and dry  Capillary Refill: Capillary refill takes less than 2 seconds  Coloration: Skin is not jaundiced   " Findings: No bruising, erythema, lesion or rash  Neurological:      Mental Status: She is alert and oriented to person, place, and time  Mental status is at baseline  Cranial Nerves: No cranial nerve deficit  Sensory: No sensory deficit  Motor: No weakness  Coordination: Coordination normal       Gait: Gait normal    Psychiatric:         Behavior: Behavior normal          Thought Content:  Thought content normal          Judgment: Judgment normal

## 2023-06-20 LAB
BACTERIA UR QL AUTO: ABNORMAL /HPF
HBA1C MFR BLD HPLC: 5.4 %
MUCOUS THREADS UR QL AUTO: ABNORMAL
NON-SQ EPI CELLS URNS QL MICRO: ABNORMAL /HPF
RBC #/AREA URNS AUTO: ABNORMAL /HPF
WBC #/AREA URNS AUTO: ABNORMAL /HPF

## 2023-06-21 LAB — BACTERIA UR CULT: NORMAL

## 2023-07-17 ENCOUNTER — OFFICE VISIT (OUTPATIENT)
Dept: URGENT CARE | Age: 48
End: 2023-07-17
Payer: COMMERCIAL

## 2023-07-17 ENCOUNTER — APPOINTMENT (OUTPATIENT)
Dept: RADIOLOGY | Age: 48
End: 2023-07-17
Payer: COMMERCIAL

## 2023-07-17 VITALS
HEIGHT: 68 IN | RESPIRATION RATE: 20 BRPM | SYSTOLIC BLOOD PRESSURE: 137 MMHG | HEART RATE: 94 BPM | DIASTOLIC BLOOD PRESSURE: 72 MMHG | WEIGHT: 173.4 LBS | TEMPERATURE: 96.6 F | OXYGEN SATURATION: 96 % | BODY MASS INDEX: 26.28 KG/M2

## 2023-07-17 DIAGNOSIS — S29.9XXA RIB INJURY: ICD-10-CM

## 2023-07-17 DIAGNOSIS — S29.9XXA RIB INJURY: Primary | ICD-10-CM

## 2023-07-17 PROCEDURE — 71101 X-RAY EXAM UNILAT RIBS/CHEST: CPT

## 2023-07-17 PROCEDURE — 99213 OFFICE O/P EST LOW 20 MIN: CPT

## 2023-07-17 RX ORDER — LIDOCAINE 50 MG/G
1 PATCH TOPICAL DAILY
Qty: 10 PATCH | Refills: 0 | Status: SHIPPED | OUTPATIENT
Start: 2023-07-17 | End: 2023-07-27

## 2023-07-17 RX ORDER — KETOROLAC TROMETHAMINE 30 MG/ML
30 INJECTION, SOLUTION INTRAMUSCULAR; INTRAVENOUS ONCE
Status: COMPLETED | OUTPATIENT
Start: 2023-07-17 | End: 2023-07-17

## 2023-07-17 RX ORDER — GABAPENTIN 100 MG/1
CAPSULE ORAL
Refills: 0 | OUTPATIENT
Start: 2023-07-17

## 2023-07-17 RX ADMIN — KETOROLAC TROMETHAMINE 30 MG: 30 INJECTION, SOLUTION INTRAMUSCULAR; INTRAVENOUS at 12:01

## 2023-07-17 NOTE — LETTER
July 17, 2023     Patient: Lester Ahumada   YOB: 1975   Date of Visit: 7/17/2023       To Whom it May Concern:    Elroy Pham was seen in my clinic on 7/17/2023. Please excuse her from work today. Thank you.        Sincerely,          Shyann Mosley

## 2023-07-17 NOTE — PATIENT INSTRUCTIONS
Await final radiologist read. Lidocaine patches & Tylenol as directed. Follow up with PCP in 2-3 days. Proceed to ER if symptoms worsen.

## 2023-07-17 NOTE — TELEPHONE ENCOUNTER
Patient requested medication refill on medication I have never prescribed. She was prescribed Lidocaine patches and is requesting Gabapentin.

## 2023-07-17 NOTE — PROGRESS NOTES
[facility]        NAME: Alber Barrios is a 52 y.o. female  : 1975    MRN: [id number]  DATE: 2023  TIME: 2:55 PM    Assessment and Plan   Rib injury [S29. 9XXA]  1. Rib injury  XR ribs left w pa chest min 3 views    lidocaine (Lidoderm) 5 %    ketorolac (TORADOL) injection 30 mg            Patient Instructions     XR wet read negative for acute fracture. No evidence of PTX. She is requesting a dose of IM Toradol - states she can't take PO NSAIDs but can take "liquid Advil." States she has tolerated it before and is requesting it now. Discussed Lidocaine patches and supportive care measures. Discussed rib injuries can take a few weeks to heal.  Discussed f/u with PCP. Follow up with PCP in 2-3 days. Proceed to ER if symptoms worsen. Chief Complaint     Chief Complaint   Patient presents with   • Rib Injury     [place] and while attempting to move from one rock to another, slipped and hit left side of rib cage onto rock. Pt applied ice, heat and taking Tylenol. History of Present Illness     52 y.o. F  L rib pain s/p fall yesterday  States [place] and [place] trying to jump from one rock to another  GRUB forward, landing on top of the rock  +pain with inspiration  Denies CP or SOB  Ice, heat & Tylenol OTC  Denies head strike/LOC    Review of Systems   Review of Systems   Constitutional: Negative for chills, fatigue and fever. HENT: Negative for congestion, ear pain, facial swelling, hearing loss, rhinorrhea, sinus pressure, sneezing, sore throat and trouble swallowing. Eyes: Negative for pain, redness and visual disturbance. Respiratory: Negative for cough, chest tightness, shortness of breath and wheezing. Cardiovascular: Positive for chest pain (L rib pain). Negative for palpitations. Gastrointestinal: Negative for abdominal pain, diarrhea, nausea and vomiting.    Genitourinary: Negative for dysuria, flank pain, hematuria and pelvic pain. Musculoskeletal: Negative for arthralgias, back pain and myalgias. Skin: Negative for color change and rash. Neurological: Negative for dizziness, seizures, syncope, weakness, light-headedness and headaches. Psychiatric/Behavioral: Negative for confusion, hallucinations and sleep disturbance. The patient is not nervous/anxious. All other systems reviewed and are negative. Current Medications       Current Outpatient Medications:   •  ALPRAZolam (XANAX) 0.5 mg tablet, Take 1 tablet (0.5 mg total) by mouth 2 (two) times a day as needed for anxiety, Disp: 60 tablet, Rfl: 3  •  ARIPiprazole (ABILIFY) 5 mg tablet, Take 1 tablet (5 mg total) by mouth daily, Disp: 30 tablet, Rfl: 3  •  Cyanocobalamin (VITAMIN B-12 PO), 5,000 mg, Disp: , Rfl:   •  diphenhydrAMINE (BENADRYL) 50 mg capsule, Take 50 mg by mouth daily as needed  , Disp: , Rfl:   •  lidocaine (Lidoderm) 5 %, Apply 1 patch topically over 12 hours daily for 10 days Remove & Discard patch within 12 hours or as directed by MD, Disp: 10 patch, Rfl: 0  •  MAGNESIUM MALATE PO, , Disp: , Rfl:   •  methocarbamol (ROBAXIN) 500 mg tablet, Take 1 tablet (500 mg total) by mouth 4 (four) times a day, Disp: 120 tablet, Rfl: 3  •  omeprazole (PriLOSEC) 40 MG capsule, Take 1 capsule (40 mg total) by mouth 2 (two) times a day, Disp: 60 capsule, Rfl: 3  •  patient supplied medication, Place on the skin as needed CBD Creme, Disp: , Rfl:   •  Probiotic Product (PROBIOTIC DAILY PO), , Disp: , Rfl:   •  Pyridoxine HCl (VITAMIN B-6 PO), 50 mg, Disp: , Rfl:   •  vortioxetine (Trintellix) 10 MG tablet, Take 1 tablet (10 mg total) by mouth daily, Disp: 30 tablet, Rfl: 3  •  zolpidem (AMBIEN) 10 mg tablet, Take 1 tablet (10 mg total) by mouth daily at bedtime as needed for sleep, Disp: 30 tablet, Rfl: 3  No current facility-administered medications for this visit.     Current Allergies     Allergies as of 07/17/2023 - Reviewed 07/17/2023 Allergen Reaction Noted   • Bee pollen  07/05/2019   • Bee venom  03/25/2017   • Cat hair extract  03/25/2017   • Diclofenac  06/06/2017   • Dog epithelium  06/01/2017   • Dust mite extract  06/01/2017   • Grass extracts  [gramineae pollens]  06/01/2017   • Other  06/01/2017   • Pollen extract  06/01/2017            The following portions of the patient's history were reviewed and updated as appropriate: allergies, current medications, past family history, past medical history, past social history, past surgical history and problem list.     Past Medical History:   Diagnosis Date   • Anxiety    • Depression    • Gallbladder disorder    • GERD (gastroesophageal reflux disease)    • Menorrhagia    • Neck pain on left side    • Severe dysmenorrhea        Past Surgical History:   Procedure Laterality Date   • CERVICAL BIOPSY  W/ LOOP ELECTRODE EXCISION  2006   • CHOLECYSTECTOMY     • WISDOM TOOTH EXTRACTION         Family History   Problem Relation Age of Onset   • Lung cancer Mother    • No Known Problems Father    • Cancer Paternal Grandfather          Medications have been verified. Objective   /72 (BP Location: Right arm, Patient Position: Sitting, Cuff Size: Standard)   Pulse 94   Temp (!) 96.6 °F (35.9 °C) (Tympanic)   Resp 20   Ht 5' 8" (1.727 m)   Wt 78.7 kg (173 lb 6.4 oz)   LMP 12/06/2016   SpO2 96%   BMI 26.37 kg/m²   Patient's last menstrual period was 12/06/2016. Physical Exam     Physical Exam  Vitals reviewed. Constitutional:       General: She is not in acute distress. Appearance: Normal appearance. She is not toxic-appearing. HENT:      Head: Normocephalic. Right Ear: Tympanic membrane normal. Tympanic membrane is not erythematous or bulging. Left Ear: Tympanic membrane normal. Tympanic membrane is not erythematous or bulging. Nose: No congestion or rhinorrhea. Right Sinus: No maxillary sinus tenderness or frontal sinus tenderness.       Left Sinus: No maxillary sinus tenderness or frontal sinus tenderness. Mouth/Throat:      Pharynx: Uvula midline. No oropharyngeal exudate, posterior oropharyngeal erythema or uvula swelling. Tonsils: No tonsillar exudate or tonsillar abscesses. Eyes:      Extraocular Movements: Extraocular movements intact. Conjunctiva/sclera: Conjunctivae normal.      Pupils: Pupils are equal, round, and reactive to light. Cardiovascular:      Rate and Rhythm: Normal rate and regular rhythm. Pulses: Normal pulses. Heart sounds: Normal heart sounds. Pulmonary:      Effort: Pulmonary effort is normal. No tachypnea or respiratory distress. Breath sounds: Normal breath sounds and air entry. No decreased breath sounds, wheezing, rhonchi or rales. Comments: CTAB  Chest:      Chest wall: Tenderness present. Abdominal:      General: Bowel sounds are normal.      Palpations: Abdomen is soft. Tenderness: There is no abdominal tenderness. There is no right CVA tenderness or guarding. Musculoskeletal:         General: Normal range of motion. Cervical back: Normal range of motion. Lymphadenopathy:      Cervical: No cervical adenopathy. Skin:     General: Skin is warm and dry. Neurological:      General: No focal deficit present. Mental Status: She is alert. Sensory: Sensation is intact. Motor: Motor function is intact. Coordination: Coordination is intact. Gait: Gait is intact. Deep Tendon Reflexes: Reflexes are normal and symmetric.

## 2023-08-22 NOTE — TELEPHONE ENCOUNTER
Called pt to r/s her 9/13 appt with Cydney DRAKE [facility] is no longer in [place] that day, so pt will have to be r/s to Wednesday 9/15 with Eladia Kumar  Topical Metronidazole Pregnancy And Lactation Text: This medication is Pregnancy Category B and considered safe during pregnancy.  It is also considered safe to use while breastfeeding.

## 2023-09-05 ENCOUNTER — OFFICE VISIT (OUTPATIENT)
Dept: URGENT CARE | Age: 48
End: 2023-09-05
Payer: COMMERCIAL

## 2023-09-05 VITALS
SYSTOLIC BLOOD PRESSURE: 123 MMHG | RESPIRATION RATE: 18 BRPM | TEMPERATURE: 96.6 F | DIASTOLIC BLOOD PRESSURE: 62 MMHG | OXYGEN SATURATION: 95 % | HEART RATE: 78 BPM

## 2023-09-05 DIAGNOSIS — J01.00 ACUTE NON-RECURRENT MAXILLARY SINUSITIS: Primary | ICD-10-CM

## 2023-09-05 PROCEDURE — 99213 OFFICE O/P EST LOW 20 MIN: CPT

## 2023-09-08 NOTE — PROGRESS NOTES
[facility]        NAME: Ellis Yap is a 52 y.o. female  : 1975    MRN: [id number]  DATE: 2023  TIME: 8:34 AM    Assessment and Plan   Acute non-recurrent maxillary sinusitis [J01.00]  1. Acute non-recurrent maxillary sinusitis          Sinus congestion, pressure. Ear clogged/popping. Denies fevers, denies pain. Assessment notes congestion, no adenopathy, clear BS. Ear effusions. Discussed symptom management/treatment. Resume allergy meds. Patient Instructions       Follow up with PCP  As needed    Chief Complaint     Chief Complaint   Patient presents with   • Cough     Cough, sinus pain, sore throat, green mucous, HA Sx for 6 days. Sx getting better but having episodes of dizziness and unable "to pop ears." Nausea and diarrhea for 4 days. Oral intake decreased. OTC tylenol cold and sinus, mucinex. History of Present Illness       Sinus congestion, pressure. Ear clogged/popping. Denies fevers, denies pain. Assessment notes congestion, no adenopathy, clear BS. Ear effusions. Discussed symptom management/treatment. Resume allergy meds. Review of Systems   Review of Systems   Constitutional: Negative for activity change. HENT: Positive for congestion, postnasal drip, rhinorrhea, sneezing and sore throat. Respiratory: Positive for cough. Allergic/Immunologic: Positive for environmental allergies.          Current Medications       Current Outpatient Medications:   •  ALPRAZolam (XANAX) 0.5 mg tablet, Take 1 tablet (0.5 mg total) by mouth 2 (two) times a day as needed for anxiety, Disp: 60 tablet, Rfl: 3  •  ARIPiprazole (ABILIFY) 5 mg tablet, Take 1 tablet (5 mg total) by mouth daily, Disp: 30 tablet, Rfl: 3  •  Cyanocobalamin (VITAMIN B-12 PO), 5,000 mg, Disp: , Rfl:   •  MAGNESIUM MALATE PO, , Disp: , Rfl:   •  methocarbamol (ROBAXIN) 500 mg tablet, Take 1 tablet (500 mg total) by mouth 4 (four) times a day, Disp: 120 tablet, Rfl: 3  •  omeprazole (PriLOSEC) 40 MG capsule, Take 1 capsule (40 mg total) by mouth 2 (two) times a day, Disp: 60 capsule, Rfl: 3  •  Probiotic Product (PROBIOTIC DAILY PO), , Disp: , Rfl:   •  Pyridoxine HCl (VITAMIN B-6 PO), 50 mg, Disp: , Rfl:   •  vortioxetine (Trintellix) 10 MG tablet, Take 1 tablet (10 mg total) by mouth daily, Disp: 30 tablet, Rfl: 3  •  zolpidem (AMBIEN) 10 mg tablet, Take 1 tablet (10 mg total) by mouth daily at bedtime as needed for sleep, Disp: 30 tablet, Rfl: 3  •  diphenhydrAMINE (BENADRYL) 50 mg capsule, Take 50 mg by mouth daily as needed   (Patient not taking: Reported on 9/5/2023), Disp: , Rfl:   •  lidocaine (Lidoderm) 5 %, Apply 1 patch topically over 12 hours daily for 10 days Remove & Discard patch within 12 hours or as directed by MD, Disp: 10 patch, Rfl: 0  •  patient supplied medication, Place on the skin as needed CBD Creme, Disp: , Rfl:     Current Allergies     Allergies as of 09/05/2023 - Reviewed 09/05/2023   Allergen Reaction Noted   • Bee pollen  07/05/2019   • Bee venom  03/25/2017   • Cat hair extract  03/25/2017   • Diclofenac  06/06/2017   • Dog epithelium  06/01/2017   • Dust mite extract  06/01/2017   • Grass extracts  [gramineae pollens]  06/01/2017   • Other  06/01/2017   • Pollen extract  06/01/2017            The following portions of the patient's history were reviewed and updated as appropriate: allergies, current medications, past family history, past medical history, past social history, past surgical history and problem list.     Past Medical History:   Diagnosis Date   • Anxiety    • Depression    • Gallbladder disorder    • GERD (gastroesophageal reflux disease)    • Menorrhagia    • Neck pain on left side    • Severe dysmenorrhea        Past Surgical History:   Procedure Laterality Date   • CERVICAL BIOPSY  W/ LOOP ELECTRODE EXCISION  2006   • CHOLECYSTECTOMY     • WISDOM TOOTH EXTRACTION         Family History   Problem Relation Age of Onset   • Lung cancer Mother    • No Known Problems Father    • Cancer Paternal Grandfather          Medications have been verified. Objective   /62   Pulse 78   Temp (!) 96.6 °F (35.9 °C) (Tympanic)   Resp 18   LMP 12/06/2016   SpO2 95%   Patient's last menstrual period was 12/06/2016. Physical Exam     Physical Exam  Vitals reviewed. Constitutional:       Appearance: Normal appearance. HENT:      Right Ear: A middle ear effusion is present. Left Ear: A middle ear effusion is present. Nose: Congestion and rhinorrhea present. Cardiovascular:      Rate and Rhythm: Normal rate and regular rhythm. Pulses: Normal pulses. Heart sounds: Normal heart sounds. Pulmonary:      Effort: Pulmonary effort is normal.      Breath sounds: Normal breath sounds. Lymphadenopathy:      Cervical: No cervical adenopathy. Neurological:      Mental Status: She is alert.

## 2023-10-09 ENCOUNTER — OFFICE VISIT (OUTPATIENT)
Dept: FAMILY MEDICINE CLINIC | Facility: CLINIC | Age: 48
End: 2023-10-09
Payer: COMMERCIAL

## 2023-10-09 VITALS
WEIGHT: 170.4 LBS | TEMPERATURE: 98.6 F | HEIGHT: 67 IN | SYSTOLIC BLOOD PRESSURE: 108 MMHG | DIASTOLIC BLOOD PRESSURE: 68 MMHG | OXYGEN SATURATION: 97 % | BODY MASS INDEX: 26.74 KG/M2 | HEART RATE: 90 BPM

## 2023-10-09 DIAGNOSIS — R80.1 PERSISTENT PROTEINURIA: ICD-10-CM

## 2023-10-09 DIAGNOSIS — E78.2 MIXED HYPERLIPIDEMIA: ICD-10-CM

## 2023-10-09 DIAGNOSIS — F33.9 DEPRESSION, RECURRENT (HCC): ICD-10-CM

## 2023-10-09 DIAGNOSIS — M54.12 CERVICAL RADICULITIS: ICD-10-CM

## 2023-10-09 DIAGNOSIS — M79.18 MYOFASCIAL PAIN SYNDROME: ICD-10-CM

## 2023-10-09 DIAGNOSIS — K21.00 GASTROESOPHAGEAL REFLUX DISEASE WITH ESOPHAGITIS WITHOUT HEMORRHAGE: ICD-10-CM

## 2023-10-09 DIAGNOSIS — F41.0 PANIC ATTACK: ICD-10-CM

## 2023-10-09 DIAGNOSIS — R82.4 KETONURIA: ICD-10-CM

## 2023-10-09 DIAGNOSIS — F51.01 PRIMARY INSOMNIA: ICD-10-CM

## 2023-10-09 DIAGNOSIS — Z00.00 WELL ADULT EXAM: Primary | ICD-10-CM

## 2023-10-09 PROCEDURE — 99396 PREV VISIT EST AGE 40-64: CPT | Performed by: FAMILY MEDICINE

## 2023-10-09 RX ORDER — ALPRAZOLAM 0.5 MG/1
0.5 TABLET ORAL 2 TIMES DAILY PRN
Qty: 60 TABLET | Refills: 3 | Status: SHIPPED | OUTPATIENT
Start: 2023-10-09

## 2023-10-09 RX ORDER — OMEPRAZOLE 40 MG/1
40 CAPSULE, DELAYED RELEASE ORAL 2 TIMES DAILY
Qty: 60 CAPSULE | Refills: 3 | Status: SHIPPED | OUTPATIENT
Start: 2023-10-09

## 2023-10-09 RX ORDER — METHOCARBAMOL 500 MG/1
500 TABLET, FILM COATED ORAL 4 TIMES DAILY
Qty: 120 TABLET | Refills: 3 | Status: SHIPPED | OUTPATIENT
Start: 2023-10-09

## 2023-10-09 RX ORDER — ZOLPIDEM TARTRATE 10 MG/1
10 TABLET ORAL
Qty: 30 TABLET | Refills: 3 | Status: SHIPPED | OUTPATIENT
Start: 2023-10-09

## 2023-10-09 RX ORDER — ARIPIPRAZOLE 5 MG/1
5 TABLET ORAL DAILY
Qty: 30 TABLET | Refills: 3 | Status: SHIPPED | OUTPATIENT
Start: 2023-10-09

## 2023-10-09 NOTE — PROGRESS NOTES
Assessment/Plan: Vaccines up-to-date. Labs up-to-date. Colonoscopy up-to-date done in 2021. Patient up-to-date with gynecologic care as well as mammograms. Patient will continue with exercise and appropriate diet. Patient will have repeat cholesterol level done in the future. Diagnoses and all orders for this visit:    Well adult exam    Mixed hyperlipidemia  -     Lipid panel; Future    Primary insomnia  -     zolpidem (AMBIEN) 10 mg tablet; Take 1 tablet (10 mg total) by mouth daily at bedtime as needed for sleep  -     ARIPiprazole (ABILIFY) 5 mg tablet; Take 1 tablet (5 mg total) by mouth daily    Panic attack  -     vortioxetine (Trintellix) 10 MG tablet; Take 1 tablet (10 mg total) by mouth daily  -     ARIPiprazole (ABILIFY) 5 mg tablet; Take 1 tablet (5 mg total) by mouth daily  -     ALPRAZolam (XANAX) 0.5 mg tablet; Take 1 tablet (0.5 mg total) by mouth 2 (two) times a day as needed for anxiety    Depression, recurrent (HCC)  -     vortioxetine (Trintellix) 10 MG tablet; Take 1 tablet (10 mg total) by mouth daily  -     ARIPiprazole (ABILIFY) 5 mg tablet; Take 1 tablet (5 mg total) by mouth daily    Gastroesophageal reflux disease with esophagitis without hemorrhage  -     omeprazole (PriLOSEC) 40 MG capsule; Take 1 capsule (40 mg total) by mouth 2 (two) times a day    Cervical radiculitis  -     methocarbamol (ROBAXIN) 500 mg tablet; Take 1 tablet (500 mg total) by mouth 4 (four) times a day    Myofascial pain syndrome  -     ARIPiprazole (ABILIFY) 5 mg tablet; Take 1 tablet (5 mg total) by mouth daily    Persistent proteinuria  -     ARIPiprazole (ABILIFY) 5 mg tablet; Take 1 tablet (5 mg total) by mouth daily    Ketonuria  -     ARIPiprazole (ABILIFY) 5 mg tablet; Take 1 tablet (5 mg total) by mouth daily            Subjective:        Patient ID: Ellis Yap is a 52 y.o. female. Patient is here for wellness exam.  Labs and vaccines reviewed. Patient up-to-date with gynecologic care. Patient up-to-date with mammograms. Patient up-to-date with colonoscopy done in 2021. Patient feeling well overall. The following portions of the patient's history were reviewed and updated as appropriate: allergies, current medications, past family history, past medical history, past social history, past surgical history and problem list.      Review of Systems   Constitutional: Negative. HENT: Negative. Eyes: Negative. Respiratory: Negative. Cardiovascular: Negative. Gastrointestinal: Negative. Endocrine: Negative. Genitourinary: Negative. Musculoskeletal: Negative. Skin: Negative. Allergic/Immunologic: Negative. Neurological: Negative. Hematological: Negative. Psychiatric/Behavioral: Negative. Objective:               /68 (BP Location: Right arm, Patient Position: Sitting, Cuff Size: Large)   Pulse 90   Temp 98.6 °F (37 °C) (Temporal)   Ht 5' 7" (1.702 m)   Wt 77.3 kg (170 lb 6.4 oz)   LMP 12/06/2016   SpO2 97%   BMI 26.69 kg/m²          Physical Exam  Vitals and nursing note reviewed. Constitutional:       General: She is not in acute distress. Appearance: Normal appearance. She is not ill-appearing, toxic-appearing or diaphoretic. HENT:      Head: Normocephalic and atraumatic. Right Ear: Tympanic membrane, ear canal and external ear normal. There is no impacted cerumen. Left Ear: Tympanic membrane, ear canal and external ear normal. There is no impacted cerumen. Nose: Nose normal. No congestion or rhinorrhea. Mouth/Throat:      Mouth: Mucous membranes are moist.      Pharynx: No oropharyngeal exudate or posterior oropharyngeal erythema. Eyes:      General: No scleral icterus. Right eye: No discharge. Left eye: No discharge. Neck:      Vascular: No carotid bruit. Cardiovascular:      Rate and Rhythm: Normal rate and regular rhythm. Pulses: Normal pulses.       Heart sounds: Normal heart sounds. No murmur heard. No friction rub. No gallop. Pulmonary:      Effort: Pulmonary effort is normal. No respiratory distress. Breath sounds: Normal breath sounds. No stridor. No wheezing, rhonchi or rales. Chest:      Chest wall: No tenderness. Abdominal:      General: Abdomen is flat. Bowel sounds are normal. There is no distension. Palpations: Abdomen is soft. Tenderness: There is no abdominal tenderness. There is no guarding or rebound. Musculoskeletal:         General: No swelling, tenderness, deformity or signs of injury. Normal range of motion. Cervical back: Normal range of motion and neck supple. No rigidity. No muscular tenderness. Right lower leg: No edema. Left lower leg: No edema. Lymphadenopathy:      Cervical: No cervical adenopathy. Skin:     General: Skin is warm and dry. Capillary Refill: Capillary refill takes less than 2 seconds. Coloration: Skin is not jaundiced. Findings: No bruising, erythema, lesion or rash. Neurological:      Mental Status: She is alert and oriented to person, place, and time. Mental status is at baseline. Cranial Nerves: No cranial nerve deficit. Sensory: No sensory deficit. Motor: No weakness. Coordination: Coordination normal.      Gait: Gait normal.   Psychiatric:         Mood and Affect: Mood normal.         Behavior: Behavior normal.         Thought Content:  Thought content normal.         Judgment: Judgment normal.

## 2023-12-08 PROBLEM — Z00.00 WELL ADULT EXAM: Status: RESOLVED | Noted: 2023-10-09 | Resolved: 2023-12-08

## 2024-01-15 ENCOUNTER — OFFICE VISIT (OUTPATIENT)
Dept: FAMILY MEDICINE CLINIC | Facility: CLINIC | Age: 49
End: 2024-01-15
Payer: COMMERCIAL

## 2024-01-15 VITALS
TEMPERATURE: 98.3 F | BODY MASS INDEX: 26.84 KG/M2 | SYSTOLIC BLOOD PRESSURE: 114 MMHG | HEART RATE: 89 BPM | DIASTOLIC BLOOD PRESSURE: 78 MMHG | WEIGHT: 171 LBS | HEIGHT: 67 IN | OXYGEN SATURATION: 97 %

## 2024-01-15 DIAGNOSIS — M79.18 MYOFASCIAL PAIN SYNDROME: ICD-10-CM

## 2024-01-15 DIAGNOSIS — R80.1 PERSISTENT PROTEINURIA: ICD-10-CM

## 2024-01-15 DIAGNOSIS — F41.0 PANIC ATTACK: ICD-10-CM

## 2024-01-15 DIAGNOSIS — F51.01 PRIMARY INSOMNIA: ICD-10-CM

## 2024-01-15 DIAGNOSIS — F32.1 CURRENT MODERATE EPISODE OF MAJOR DEPRESSIVE DISORDER WITHOUT PRIOR EPISODE (HCC): Primary | ICD-10-CM

## 2024-01-15 DIAGNOSIS — Z72.0 TOBACCO ABUSE: ICD-10-CM

## 2024-01-15 DIAGNOSIS — R82.4 KETONURIA: ICD-10-CM

## 2024-01-15 DIAGNOSIS — F33.9 DEPRESSION, RECURRENT (HCC): ICD-10-CM

## 2024-01-15 PROCEDURE — 99214 OFFICE O/P EST MOD 30 MIN: CPT | Performed by: FAMILY MEDICINE

## 2024-01-15 RX ORDER — ARIPIPRAZOLE 10 MG/1
10 TABLET ORAL DAILY
Qty: 30 TABLET | Refills: 2 | Status: SHIPPED | OUTPATIENT
Start: 2024-01-15
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
## 2024-02-05 DIAGNOSIS — F41.0 PANIC ATTACK: ICD-10-CM

## 2024-02-05 DIAGNOSIS — M54.12 CERVICAL RADICULITIS: ICD-10-CM

## 2024-02-05 DIAGNOSIS — K21.00 GASTROESOPHAGEAL REFLUX DISEASE WITH ESOPHAGITIS WITHOUT HEMORRHAGE: ICD-10-CM

## 2024-02-05 DIAGNOSIS — F51.01 PRIMARY INSOMNIA: ICD-10-CM

## 2024-02-05 RX ORDER — OMEPRAZOLE 40 MG/1
40 CAPSULE, DELAYED RELEASE ORAL 2 TIMES DAILY
Qty: 60 CAPSULE | Refills: 5 | Status: SHIPPED | OUTPATIENT
Start: 2024-02-05

## 2024-02-05 RX ORDER — OMEPRAZOLE 40 MG/1
40 CAPSULE, DELAYED RELEASE ORAL 2 TIMES DAILY
Qty: 60 CAPSULE | Refills: 3 | OUTPATIENT
Start: 2024-02-05

## 2024-02-05 RX ORDER — METHOCARBAMOL 500 MG/1
500 TABLET, FILM COATED ORAL 4 TIMES DAILY
Qty: 120 TABLET | Refills: 3 | Status: SHIPPED | OUTPATIENT
Start: 2024-02-05

## 2024-02-06 RX ORDER — METHOCARBAMOL 500 MG/1
500 TABLET, FILM COATED ORAL 4 TIMES DAILY
Qty: 120 TABLET | Refills: 0 | Status: SHIPPED | OUTPATIENT
Start: 2024-02-06

## 2024-02-06 RX ORDER — ZOLPIDEM TARTRATE 10 MG/1
10 TABLET ORAL
Qty: 30 TABLET | Refills: 0 | Status: SHIPPED | OUTPATIENT
Start: 2024-02-06

## 2024-02-06 RX ORDER — ALPRAZOLAM 0.5 MG/1
0.5 TABLET ORAL 2 TIMES DAILY PRN
Qty: 60 TABLET | Refills: 0 | Status: SHIPPED | OUTPATIENT
Start: 2024-02-06

## 2024-02-19 ENCOUNTER — OFFICE VISIT (OUTPATIENT)
Dept: FAMILY MEDICINE CLINIC | Facility: CLINIC | Age: 49
End: 2024-02-19
Payer: COMMERCIAL

## 2024-02-19 VITALS
DIASTOLIC BLOOD PRESSURE: 68 MMHG | BODY MASS INDEX: 27.91 KG/M2 | HEIGHT: 67 IN | WEIGHT: 177.8 LBS | HEART RATE: 63 BPM | TEMPERATURE: 98 F | SYSTOLIC BLOOD PRESSURE: 108 MMHG | OXYGEN SATURATION: 96 %

## 2024-02-19 DIAGNOSIS — F33.9 DEPRESSION, RECURRENT (HCC): Primary | ICD-10-CM

## 2024-02-19 DIAGNOSIS — F41.0 PANIC ATTACK: ICD-10-CM

## 2024-02-19 PROCEDURE — 99214 OFFICE O/P EST MOD 30 MIN: CPT | Performed by: FAMILY MEDICINE

## 2024-02-19 RX ORDER — BUPROPION HYDROCHLORIDE 150 MG/1
150 TABLET ORAL EVERY MORNING
Qty: 30 TABLET | Refills: 5 | Status: SHIPPED | OUTPATIENT
Start: 2024-02-19 | End: 2024-08-17

## 2024-02-19 NOTE — PROGRESS NOTES
"Assessment/Plan: Patient will continue with Abilify and Trintellix.  Patient will add bupropion as directed.       Diagnoses and all orders for this visit:    Depression, recurrent (HCC)  -     buPROPion (WELLBUTRIN XL) 150 mg 24 hr tablet; Take 1 tablet (150 mg total) by mouth every morning    Panic attack            Subjective:        Patient ID: Yasmine Franco is a 48 y.o. female.      Patient is here to follow-up on anxiety and depression.  Patient not seeing improvement with increase Abilify as well as Trintellix.  No homicidal suicidal thoughts.  Patient with increased anxiety.          The following portions of the patient's history were reviewed and updated as appropriate: allergies, current medications, past family history, past medical history, past social history, past surgical history and problem list.      Review of Systems   Constitutional: Negative.    HENT: Negative.     Eyes: Negative.    Respiratory: Negative.     Cardiovascular: Negative.    Gastrointestinal: Negative.    Endocrine: Negative.    Genitourinary: Negative.    Musculoskeletal: Negative.    Skin: Negative.    Allergic/Immunologic: Negative.    Neurological: Negative.    Hematological: Negative.    Psychiatric/Behavioral:  Positive for dysphoric mood. Negative for suicidal ideas. The patient is nervous/anxious.            Objective:               /68 (BP Location: Right arm, Patient Position: Sitting, Cuff Size: Standard)   Pulse 63   Temp 98 °F (36.7 °C) (Temporal)   Ht 5' 7\" (1.702 m)   Wt 80.6 kg (177 lb 12.8 oz)   LMP 12/06/2016   SpO2 96%   BMI 27.85 kg/m²          Physical Exam  Vitals and nursing note reviewed.   Constitutional:       General: She is not in acute distress.     Appearance: Normal appearance. She is not ill-appearing, toxic-appearing or diaphoretic.   HENT:      Head: Normocephalic and atraumatic.      Right Ear: Tympanic membrane, ear canal and external ear normal. There is no impacted cerumen.      " Left Ear: Tympanic membrane, ear canal and external ear normal. There is no impacted cerumen.      Nose: Nose normal. No congestion or rhinorrhea.      Mouth/Throat:      Mouth: Mucous membranes are moist.      Pharynx: No oropharyngeal exudate or posterior oropharyngeal erythema.   Eyes:      General: No scleral icterus.        Right eye: No discharge.         Left eye: No discharge.   Neck:      Vascular: No carotid bruit.   Cardiovascular:      Rate and Rhythm: Normal rate and regular rhythm.      Pulses: Normal pulses.      Heart sounds: Normal heart sounds. No murmur heard.     No friction rub. No gallop.   Pulmonary:      Effort: Pulmonary effort is normal. No respiratory distress.      Breath sounds: Normal breath sounds. No stridor. No wheezing, rhonchi or rales.   Chest:      Chest wall: No tenderness.   Musculoskeletal:         General: No swelling, tenderness, deformity or signs of injury. Normal range of motion.      Cervical back: Normal range of motion and neck supple. No rigidity. No muscular tenderness.      Right lower leg: No edema.      Left lower leg: No edema.   Lymphadenopathy:      Cervical: No cervical adenopathy.   Skin:     General: Skin is warm and dry.      Capillary Refill: Capillary refill takes less than 2 seconds.      Coloration: Skin is not jaundiced.      Findings: No bruising, erythema, lesion or rash.   Neurological:      General: No focal deficit present.      Mental Status: She is alert and oriented to person, place, and time.      Cranial Nerves: No cranial nerve deficit.      Sensory: No sensory deficit.      Motor: No weakness.      Coordination: Coordination normal.      Gait: Gait normal.   Psychiatric:         Mood and Affect: Mood normal.         Behavior: Behavior normal.         Thought Content: Thought content normal.         Judgment: Judgment normal.

## 2024-02-20 DIAGNOSIS — Z63.4 BEREAVEMENT: ICD-10-CM

## 2024-02-20 DIAGNOSIS — F33.9 DEPRESSION, RECURRENT (HCC): Primary | ICD-10-CM

## 2024-02-20 SDOH — SOCIAL STABILITY - SOCIAL INSECURITY: DISSAPEARANCE AND DEATH OF FAMILY MEMBER: Z63.4

## 2024-02-20 NOTE — TELEPHONE ENCOUNTER
Pt is calling due to interaction between her trintellix and abilify with the Wellbutrin. She was told this by the pharmacy. She is asking if she should just not take the Wellbutrin or if she should take the Wellbutrin and reduce the other 2 medications.

## 2024-02-20 NOTE — TELEPHONE ENCOUNTER
Pt states that according to the pharmacist the trintellix also has to be reduced that there severe interactions with the trintellix and Abilify when Wellbutrin is added and if Wellbutrin is going to be added both of the other medications must be decreased she is happy to add the Wellbutrin but will need new script for low dosage  would need 10mg of trintellix and 5mg of Abilify  that would be half the dose currently taking in order to add the Wellbutrin please advise

## 2024-02-21 ENCOUNTER — TELEPHONE (OUTPATIENT)
Dept: FAMILY MEDICINE CLINIC | Facility: CLINIC | Age: 49
End: 2024-02-21

## 2024-02-21 RX ORDER — ARIPIPRAZOLE 5 MG/1
5 TABLET ORAL DAILY
Qty: 30 TABLET | Refills: 2 | Status: SHIPPED | OUTPATIENT
Start: 2024-02-21

## 2024-03-04 DIAGNOSIS — F51.01 PRIMARY INSOMNIA: ICD-10-CM

## 2024-03-04 DIAGNOSIS — F41.0 PANIC ATTACK: ICD-10-CM

## 2024-03-04 RX ORDER — ALPRAZOLAM 0.5 MG/1
0.5 TABLET ORAL 2 TIMES DAILY PRN
Qty: 60 TABLET | Refills: 0 | Status: SHIPPED | OUTPATIENT
Start: 2024-03-04

## 2024-03-04 RX ORDER — ZOLPIDEM TARTRATE 10 MG/1
10 TABLET ORAL
Qty: 30 TABLET | Refills: 0 | Status: SHIPPED | OUTPATIENT
Start: 2024-03-04

## 2024-04-01 DIAGNOSIS — F41.0 PANIC ATTACK: ICD-10-CM

## 2024-04-01 DIAGNOSIS — F33.9 DEPRESSION, RECURRENT (HCC): ICD-10-CM

## 2024-04-01 DIAGNOSIS — Z63.4 BEREAVEMENT: ICD-10-CM

## 2024-04-01 DIAGNOSIS — F51.01 PRIMARY INSOMNIA: ICD-10-CM

## 2024-04-01 RX ORDER — ARIPIPRAZOLE 5 MG/1
5 TABLET ORAL DAILY
Qty: 30 TABLET | Refills: 0 | Status: CANCELLED | OUTPATIENT
Start: 2024-04-01

## 2024-04-01 SDOH — SOCIAL STABILITY - SOCIAL INSECURITY: DISSAPEARANCE AND DEATH OF FAMILY MEMBER: Z63.4

## 2024-04-02 RX ORDER — ZOLPIDEM TARTRATE 10 MG/1
10 TABLET ORAL
Qty: 30 TABLET | Refills: 0 | Status: SHIPPED | OUTPATIENT
Start: 2024-04-02

## 2024-04-02 RX ORDER — ALPRAZOLAM 0.5 MG/1
0.5 TABLET ORAL 2 TIMES DAILY PRN
Qty: 60 TABLET | Refills: 0 | Status: SHIPPED | OUTPATIENT
Start: 2024-04-02

## 2024-04-02 RX ORDER — ARIPIPRAZOLE 20 MG/1
20 TABLET ORAL DAILY
Qty: 30 TABLET | Refills: 2 | Status: SHIPPED | OUTPATIENT
Start: 2024-04-02

## 2024-04-19 ENCOUNTER — OFFICE VISIT (OUTPATIENT)
Dept: FAMILY MEDICINE CLINIC | Facility: CLINIC | Age: 49
End: 2024-04-19
Payer: COMMERCIAL

## 2024-04-19 VITALS
DIASTOLIC BLOOD PRESSURE: 78 MMHG | OXYGEN SATURATION: 98 % | HEART RATE: 88 BPM | WEIGHT: 186 LBS | HEIGHT: 68 IN | BODY MASS INDEX: 28.19 KG/M2 | TEMPERATURE: 98.2 F | SYSTOLIC BLOOD PRESSURE: 120 MMHG

## 2024-04-19 DIAGNOSIS — F33.9 DEPRESSION, RECURRENT (HCC): ICD-10-CM

## 2024-04-19 PROCEDURE — 99213 OFFICE O/P EST LOW 20 MIN: CPT | Performed by: FAMILY MEDICINE

## 2024-04-19 NOTE — PROGRESS NOTES
"Assessment/Plan: Patient will switch over to dextromethorphan/bupropion.  Patient will wean off Abilify and Trintellix as directed.  Follow-up in 2 to 3 months       Diagnoses and all orders for this visit:    Depression, recurrent (HCC)  -     Dextromethorphan-buPROPion ER  MG TBCR; Take 1 tablet by mouth in the morning            Subjective:        Patient ID: Yasmine Franco is a 48 y.o. female.      HPI      The following portions of the patient's history were reviewed and updated as appropriate: allergies, current medications, past family history, past medical history, past social history, past surgical history and problem list.      Review of Systems        Objective:               /78 (BP Location: Left arm, Patient Position: Sitting, Cuff Size: Standard)   Pulse 88   Temp 98.2 °F (36.8 °C) (Temporal)   Ht 5' 8\" (1.727 m)   Wt 84.4 kg (186 lb)   LMP 12/06/2016   SpO2 98%   BMI 28.28 kg/m²          Physical Exam    "

## 2024-04-24 ENCOUNTER — TELEPHONE (OUTPATIENT)
Age: 49
End: 2024-04-24

## 2024-04-24 DIAGNOSIS — F33.9 DEPRESSION, RECURRENT (HCC): ICD-10-CM

## 2024-04-24 NOTE — TELEPHONE ENCOUNTER
Reason for call:   [] Refill   [] Prior Auth  [x] Other: Pt called and had appt this week and pcp but pt on new med but it was never sent, will sent new med over to pharmacy.    Office:   [x] PCP/Provider -   [] Specialty/Provider -     Medication: Dextromethorphan-buPROPion ER  MG TBCR

## 2024-04-24 NOTE — TELEPHONE ENCOUNTER
Reason for call:   [x] Prior Auth  [] Other:     Caller:  [x] Patient  [] Pharmacy    Pharmacy    [facility] [id number] [facility] [employer] - [place] - [address] [facility]  This order has not been released to its destination.  [address], [facility] [address]  Phone: [phone]  Fax: [phone]  TUCKER #: [id number]                Medication:   Dextromethorphan-buPROPion ER  MG TBCR [[id number]]  Dextromethorphan-buPROPion ER  MG TBCR [[id number]]    Order Details  Dose: 1 tablet Route: Oral Frequency: Daily   Dispense Quantity: 30 tablet Refills: 5          Sig: Take 1 tablet by mouth in the morning         Start Date: 04/24/24 End Date: --   Written Date: 04/24/24 Expiration Date: 04/24/25       Associated Diagnoses: Depression, recurrent (HCC) [F33.9]   Original Order: Dextromethorphan-buPROPion ER  MG TBCR [[id number]]   Providers      Ordering and Authorizing Provider:  Eliel Chamberlain DO  [facility] [address]  Phone: --         Ordering Provider:   [x] PCP/Provider -   Eliel Chamberlain DO  PCP - General  [] Speciality/Provider -     Has the patient tried other medications and failed? If failed, which medications did they fail?    [] No   [x] Yes -     Is the patient's insurance updated in EPIC?   [x] Yes   [] No     Is a copy of the patient's insurance scanned in EPIC?   [x] Yes   [] No

## 2024-04-29 DIAGNOSIS — F41.0 PANIC ATTACK: ICD-10-CM

## 2024-04-29 DIAGNOSIS — F51.01 PRIMARY INSOMNIA: ICD-10-CM

## 2024-04-29 NOTE — TELEPHONE ENCOUNTER
PA for Dextromethorphan-buPROPion ER  MG     Submitted via    [x]CMM-KEY [id number]  []Surescripts-Case ID #   []Faxed to plan   []Other website   []Phone call Case ID #     Office notes sent, clinical questions answered. Awaiting determination    Turnaround time for your insurance to make a decision on your Prior Authorization can take 7-21 business days.

## 2024-04-30 RX ORDER — ZOLPIDEM TARTRATE 10 MG/1
10 TABLET ORAL
Qty: 30 TABLET | Refills: 5 | Status: SHIPPED | OUTPATIENT
Start: 2024-04-30

## 2024-04-30 RX ORDER — ALPRAZOLAM 0.5 MG/1
0.5 TABLET ORAL 2 TIMES DAILY PRN
Qty: 60 TABLET | Refills: 5 | Status: SHIPPED | OUTPATIENT
Start: 2024-04-30

## 2024-04-30 NOTE — TELEPHONE ENCOUNTER
PA for Auvelity Approved     Date(s) approved until 4/28/2025    Case #[id number]    Patient advised by          [x] T-Quad 22hart Message  [] Phone call   []LMOM  []L/M to call office as no active Communication consent on file  []Unable to leave detailed message as VM not approved on Communication consent         Pharmacy advised by    [x]Fax  []Phone call    Approval letter scanned into Media Yes

## 2024-04-30 NOTE — TELEPHONE ENCOUNTER
PA for Auvelity 45-105MG er  Approved     Date(s) approved 02/29/24-4/28/25    Case #-    Patient advised by          [x] VIVAhart Message  [] Phone call   []LMOM  []L/M to call office as no active Communication consent on file  []Unable to leave detailed message as VM not approved on Communication consent         Pharmacy advised by    [x]Fax  []Phone call    Approval letter scanned into Media Yes

## 2024-07-23 ENCOUNTER — OFFICE VISIT (OUTPATIENT)
Dept: FAMILY MEDICINE CLINIC | Facility: CLINIC | Age: 49
End: 2024-07-23
Payer: COMMERCIAL

## 2024-07-23 VITALS
HEIGHT: 68 IN | DIASTOLIC BLOOD PRESSURE: 78 MMHG | SYSTOLIC BLOOD PRESSURE: 120 MMHG | WEIGHT: 186 LBS | HEART RATE: 74 BPM | BODY MASS INDEX: 28.19 KG/M2 | OXYGEN SATURATION: 98 % | TEMPERATURE: 98.4 F

## 2024-07-23 DIAGNOSIS — F33.9 DEPRESSION, RECURRENT (HCC): Primary | ICD-10-CM

## 2024-07-23 PROCEDURE — 99213 OFFICE O/P EST LOW 20 MIN: CPT | Performed by: FAMILY MEDICINE

## 2024-07-23 NOTE — PROGRESS NOTES
"Assessment/Plan:       Diagnoses and all orders for this visit:    Depression, recurrent (HCC)  -     Dextromethorphan-buPROPion ER  MG TBCR; Take 1 tablet by mouth 2 (two) times a day            Subjective:        Patient ID: Yasmine Franco is a 48 y.o. female.      Patient is here to follow-up on depression.  Patient using medication routinely daily.  Patient wishes to increase to twice daily.  No homicidal suicidal thoughts.  Patient still smoke-free.          The following portions of the patient's history were reviewed and updated as appropriate: allergies, current medications, past family history, past medical history, past social history, past surgical history and problem list.      Review of Systems   Constitutional: Negative.    HENT: Negative.     Eyes: Negative.    Respiratory: Negative.     Cardiovascular: Negative.    Gastrointestinal: Negative.    Endocrine: Negative.    Genitourinary: Negative.    Musculoskeletal: Negative.    Skin: Negative.    Allergic/Immunologic: Negative.    Neurological: Negative.    Hematological: Negative.    Psychiatric/Behavioral:  Positive for dysphoric mood.            Objective:               /78 (BP Location: Right arm, Patient Position: Sitting, Cuff Size: Standard)   Pulse 74   Temp 98.4 °F (36.9 °C) (Temporal)   Ht 5' 8\" (1.727 m)   Wt 84.4 kg (186 lb)   LMP 12/06/2016   SpO2 98%   BMI 28.28 kg/m²          Physical Exam  Vitals and nursing note reviewed.   Constitutional:       General: She is not in acute distress.     Appearance: Normal appearance. She is not ill-appearing, toxic-appearing or diaphoretic.   HENT:      Head: Normocephalic and atraumatic.      Right Ear: Tympanic membrane, ear canal and external ear normal. There is no impacted cerumen.      Left Ear: Tympanic membrane, ear canal and external ear normal. There is no impacted cerumen.      Nose: Nose normal. No congestion or rhinorrhea.      Mouth/Throat:      Mouth: Mucous " membranes are moist.      Pharynx: No oropharyngeal exudate or posterior oropharyngeal erythema.   Eyes:      General: No scleral icterus.        Right eye: No discharge.         Left eye: No discharge.   Neck:      Vascular: No carotid bruit.   Cardiovascular:      Rate and Rhythm: Normal rate and regular rhythm.      Pulses: Normal pulses.      Heart sounds: Normal heart sounds. No murmur heard.     No friction rub. No gallop.   Pulmonary:      Effort: Pulmonary effort is normal. No respiratory distress.      Breath sounds: Normal breath sounds. No stridor. No wheezing, rhonchi or rales.   Chest:      Chest wall: No tenderness.   Musculoskeletal:         General: No swelling, tenderness, deformity or signs of injury. Normal range of motion.      Cervical back: Normal range of motion and neck supple. No rigidity. No muscular tenderness.      Right lower leg: No edema.      Left lower leg: No edema.   Lymphadenopathy:      Cervical: No cervical adenopathy.   Skin:     General: Skin is warm and dry.      Capillary Refill: Capillary refill takes less than 2 seconds.      Coloration: Skin is not jaundiced.      Findings: No bruising, erythema, lesion or rash.   Neurological:      Mental Status: She is alert and oriented to person, place, and time. Mental status is at baseline.      Cranial Nerves: No cranial nerve deficit.      Sensory: No sensory deficit.      Motor: No weakness.      Coordination: Coordination normal.      Gait: Gait normal.   Psychiatric:         Mood and Affect: Mood normal.         Behavior: Behavior normal.         Thought Content: Thought content normal.         Judgment: Judgment normal.

## 2024-09-24 ENCOUNTER — OFFICE VISIT (OUTPATIENT)
Age: 49
End: 2024-09-24
Payer: COMMERCIAL

## 2024-09-24 VITALS
OXYGEN SATURATION: 97 % | TEMPERATURE: 98.3 F | DIASTOLIC BLOOD PRESSURE: 88 MMHG | HEART RATE: 72 BPM | SYSTOLIC BLOOD PRESSURE: 126 MMHG | BODY MASS INDEX: 28.01 KG/M2 | WEIGHT: 184.8 LBS | HEIGHT: 68 IN

## 2024-09-24 DIAGNOSIS — F33.9 DEPRESSION, RECURRENT (HCC): Primary | ICD-10-CM

## 2024-09-24 PROCEDURE — 99213 OFFICE O/P EST LOW 20 MIN: CPT | Performed by: FAMILY MEDICINE

## 2024-09-24 RX ORDER — DESVENLAFAXINE 100 MG/1
100 TABLET, EXTENDED RELEASE ORAL DAILY
Qty: 90 TABLET | Refills: 1 | Status: SHIPPED | OUTPATIENT
Start: 2024-09-24

## 2024-09-24 NOTE — PROGRESS NOTES
"Assessment/Plan: Patient will wean off previous bupropion//dextromethorphan.  Patient will start Pristiq as directed half tablet daily for 2 weeks then increase to full tablet daily.  Patient will follow-up in November.       Diagnoses and all orders for this visit:    Depression, recurrent (HCC)  -     desvenlafaxine (PRISTIQ) 100 mg 24 hr tablet; Take 1 tablet (100 mg total) by mouth daily            Subjective:        Patient ID: Yasmine Franco is a 48 y.o. female.      Patient here to follow-up on anxiety and depression.  Patient anxiety has been relatively stable.  Depression worse overall.  Patient would like to go back on Pristiq.  No homicidal suicidal thoughts.  No self-injury.  Patient with fullness left ear over the past few weeks.  Patient has noticed some discharge.  Patient with some drainage from both ears.  No other significant URI symptoms or fevers or chills associated with this.  No hearing loss.  No new tinnitus.  No vertigo.    Ear Fullness   Associated symptoms include ear discharge.         The following portions of the patient's history were reviewed and updated as appropriate: allergies, current medications, past family history, past medical history, past social history, past surgical history and problem list.      Review of Systems   Constitutional: Negative.    HENT:  Positive for ear discharge.    Eyes: Negative.    Respiratory: Negative.     Cardiovascular: Negative.    Gastrointestinal: Negative.    Endocrine: Negative.    Genitourinary: Negative.    Musculoskeletal: Negative.    Skin: Negative.    Allergic/Immunologic: Negative.    Neurological: Negative.    Hematological: Negative.    Psychiatric/Behavioral:  Positive for dysphoric mood and sleep disturbance. Negative for self-injury and suicidal ideas.            Objective:               /88 (BP Location: Right arm, Patient Position: Sitting, Cuff Size: Standard)   Pulse 72   Temp 98.3 °F (36.8 °C) (Temporal)   Ht 5' 8\" " (1.727 m)   Wt 83.8 kg (184 lb 12.8 oz)   LMP 12/06/2016   SpO2 97%   BMI 28.10 kg/m²          Physical Exam  Vitals and nursing note reviewed.   Constitutional:       General: She is not in acute distress.     Appearance: Normal appearance. She is not ill-appearing, toxic-appearing or diaphoretic.   HENT:      Head: Normocephalic and atraumatic.      Right Ear: Tympanic membrane, ear canal and external ear normal.      Left Ear: Tympanic membrane, ear canal and external ear normal.   Neurological:      Mental Status: She is alert.   Psychiatric:         Behavior: Behavior normal.         Thought Content: Thought content normal.         Judgment: Judgment normal.      Comments: Depressed

## 2024-09-25 ENCOUNTER — TELEPHONE (OUTPATIENT)
Age: 49
End: 2024-09-25

## 2024-09-25 NOTE — TELEPHONE ENCOUNTER
PA for desvenlafaxine (PRISTIQ) 100 mg 24 hr tablet NOT REQUIRED     Pharmacy advised by    []Fax  [x]Phone call  Spoke with pharmacy staff member [facility] received paid claim, copay $129 for 90 day supply. Patient notified via text alert when available for pick-up.

## 2024-10-19 DIAGNOSIS — K21.00 GASTROESOPHAGEAL REFLUX DISEASE WITH ESOPHAGITIS WITHOUT HEMORRHAGE: ICD-10-CM

## 2024-10-21 RX ORDER — OMEPRAZOLE 40 MG/1
40 CAPSULE, DELAYED RELEASE ORAL 2 TIMES DAILY
Qty: 60 CAPSULE | Refills: 5 | Status: SHIPPED | OUTPATIENT
Start: 2024-10-21

## 2024-10-22 DIAGNOSIS — F51.01 PRIMARY INSOMNIA: ICD-10-CM

## 2024-10-23 RX ORDER — ZOLPIDEM TARTRATE 10 MG/1
10 TABLET ORAL
Qty: 30 TABLET | Refills: 2 | Status: SHIPPED | OUTPATIENT
Start: 2024-10-23

## 2024-11-19 ENCOUNTER — OFFICE VISIT (OUTPATIENT)
Age: 49
End: 2024-11-19
Payer: COMMERCIAL

## 2024-11-19 VITALS
HEIGHT: 68 IN | HEART RATE: 72 BPM | WEIGHT: 183.6 LBS | DIASTOLIC BLOOD PRESSURE: 72 MMHG | SYSTOLIC BLOOD PRESSURE: 114 MMHG | TEMPERATURE: 98.2 F | OXYGEN SATURATION: 99 % | BODY MASS INDEX: 27.83 KG/M2

## 2024-11-19 DIAGNOSIS — M54.12 CERVICAL RADICULITIS: ICD-10-CM

## 2024-11-19 DIAGNOSIS — Z12.31 ENCOUNTER FOR SCREENING MAMMOGRAM FOR MALIGNANT NEOPLASM OF BREAST: ICD-10-CM

## 2024-11-19 DIAGNOSIS — F41.0 PANIC ATTACK: ICD-10-CM

## 2024-11-19 DIAGNOSIS — F51.01 PRIMARY INSOMNIA: ICD-10-CM

## 2024-11-19 DIAGNOSIS — K21.9 GASTROESOPHAGEAL REFLUX DISEASE WITHOUT ESOPHAGITIS: Primary | ICD-10-CM

## 2024-11-19 DIAGNOSIS — Z00.00 WELL ADULT EXAM: ICD-10-CM

## 2024-11-19 DIAGNOSIS — K21.00 GASTROESOPHAGEAL REFLUX DISEASE WITH ESOPHAGITIS WITHOUT HEMORRHAGE: ICD-10-CM

## 2024-11-19 DIAGNOSIS — F32.2 SEVERE MAJOR DEPRESSIVE DISORDER (HCC): ICD-10-CM

## 2024-11-19 PROCEDURE — 99214 OFFICE O/P EST MOD 30 MIN: CPT | Performed by: FAMILY MEDICINE

## 2024-11-19 PROCEDURE — 99396 PREV VISIT EST AGE 40-64: CPT | Performed by: FAMILY MEDICINE

## 2024-11-19 RX ORDER — OMEPRAZOLE 40 MG/1
40 CAPSULE, DELAYED RELEASE ORAL 2 TIMES DAILY
Qty: 60 CAPSULE | Refills: 5 | Status: CANCELLED | OUTPATIENT
Start: 2024-11-19

## 2024-11-19 RX ORDER — METHOCARBAMOL 500 MG/1
500 TABLET, FILM COATED ORAL 4 TIMES DAILY
Qty: 120 TABLET | Refills: 0 | Status: SHIPPED | OUTPATIENT
Start: 2024-11-19

## 2024-11-19 RX ORDER — ZOLPIDEM TARTRATE 10 MG/1
10 TABLET ORAL
Qty: 30 TABLET | Refills: 2 | Status: SHIPPED | OUTPATIENT
Start: 2024-11-19

## 2024-11-19 RX ORDER — DEXLANSOPRAZOLE 60 MG/1
60 CAPSULE, DELAYED RELEASE ORAL DAILY
Qty: 30 CAPSULE | Refills: 5 | Status: SHIPPED | OUTPATIENT
Start: 2024-11-19

## 2024-11-19 RX ORDER — ALPRAZOLAM 0.5 MG
0.5 TABLET ORAL 2 TIMES DAILY PRN
Qty: 60 TABLET | Refills: 5 | Status: SHIPPED | OUTPATIENT
Start: 2024-11-19

## 2024-11-19 NOTE — PROGRESS NOTES
Assessment/Plan: Wellness exam done at this time.  Vaccines reviewed and up-to-date.  Patient wishes to hold off with flu shot at present time.  Laboratory studies reviewed.  Patient have labs prior to next visit.  The patient will follow-up with gynecologist appropriately.  Patient will go for mammogram.  Colonoscopy up-to-date done in 2021.  Patient will switch to Dexilant.  Patient will stop omeprazole for GERD related symptoms.  To consider seeing GI in the future.  Continue with current regimen for insomnia as well as anxiety and depression.  Refills given at this time.       Diagnoses and all orders for this visit:    Gastroesophageal reflux disease without esophagitis  -     dexlansoprazole (DEXILANT) 60 MG capsule; Take 1 capsule (60 mg total) by mouth daily    Primary insomnia  -     zolpidem (AMBIEN) 10 mg tablet; Take 1 tablet (10 mg total) by mouth daily at bedtime as needed for sleep  -     Comprehensive metabolic panel; Future  -     CBC and differential; Future  -     Lipid panel; Future  -     TSH, 3rd generation with Free T4 reflex; Future  -     Comprehensive metabolic panel  -     CBC and differential  -     Lipid panel  -     TSH, 3rd generation with Free T4 reflex    Gastroesophageal reflux disease with esophagitis without hemorrhage  -     dexlansoprazole (DEXILANT) 60 MG capsule; Take 1 capsule (60 mg total) by mouth daily    Cervical radiculitis  -     methocarbamol (ROBAXIN) 500 mg tablet; Take 1 tablet (500 mg total) by mouth 4 (four) times a day  -     Comprehensive metabolic panel; Future  -     CBC and differential; Future  -     Lipid panel; Future  -     TSH, 3rd generation with Free T4 reflex; Future  -     Comprehensive metabolic panel  -     CBC and differential  -     Lipid panel  -     TSH, 3rd generation with Free T4 reflex    Panic attack  -     ALPRAZolam (XANAX) 0.5 mg tablet; Take 1 tablet (0.5 mg total) by mouth 2 (two) times a day as needed for anxiety  -     Comprehensive  metabolic panel; Future  -     CBC and differential; Future  -     Lipid panel; Future  -     TSH, 3rd generation with Free T4 reflex; Future  -     Comprehensive metabolic panel  -     CBC and differential  -     Lipid panel  -     TSH, 3rd generation with Free T4 reflex    Encounter for screening mammogram for malignant neoplasm of breast  -     Comprehensive metabolic panel; Future  -     CBC and differential; Future  -     Lipid panel; Future  -     TSH, 3rd generation with Free T4 reflex; Future  -     Mammo screening bilateral w 3d and cad; Future  -     Comprehensive metabolic panel  -     CBC and differential  -     Lipid panel  -     TSH, 3rd generation with Free T4 reflex    Well adult exam  -     Comprehensive metabolic panel; Future  -     CBC and differential; Future  -     Lipid panel; Future  -     TSH, 3rd generation with Free T4 reflex; Future  -     Comprehensive metabolic panel  -     CBC and differential  -     Lipid panel  -     TSH, 3rd generation with Free T4 reflex    Severe major depressive disorder (HCC)            Subjective:        Patient ID: Yasmine Franco is a 49 y.o. female.      Patient is here to follow-up on reflux as well as insomnia and anxiety/depression.  Mood has been relatively stable overall.  Patient will need refills on medication.  Reflux symptoms persist.  Patient is taking omeprazole 2 pills daily.  No difficulty with urination.  No other chest pain or shortness of breath.  Patient also here for wellness exam.  Vaccines as well as laboratory studies reviewed.  Colonoscopy up-to-date done 2021.          The following portions of the patient's history were reviewed and updated as appropriate: allergies, current medications, past family history, past medical history, past social history, past surgical history and problem list.      Review of Systems   Constitutional: Negative.    HENT: Negative.     Eyes: Negative.    Respiratory: Negative.     Cardiovascular: Negative.   "  Gastrointestinal: Negative.    Endocrine: Negative.    Genitourinary: Negative.    Musculoskeletal: Negative.    Skin: Negative.    Allergic/Immunologic: Negative.    Neurological: Negative.    Hematological: Negative.    Psychiatric/Behavioral: Negative.             Objective:        Depression Screening and Follow-up Plan: Patient's depression screening was positive with a PHQ-9 score of 17. Continue regular follow-up with their mental health provider who is managing their mental health condition(s).             /72 (BP Location: Right arm, Patient Position: Sitting, Cuff Size: Standard)   Pulse 72   Temp 98.2 °F (36.8 °C) (Temporal)   Ht 5' 8\" (1.727 m)   Wt 83.3 kg (183 lb 9.6 oz)   LMP 12/06/2016   SpO2 99%   BMI 27.92 kg/m²          Physical Exam  Vitals and nursing note reviewed.   Constitutional:       General: She is not in acute distress.     Appearance: Normal appearance. She is not ill-appearing, toxic-appearing or diaphoretic.   HENT:      Head: Normocephalic and atraumatic.      Right Ear: Tympanic membrane, ear canal and external ear normal. There is no impacted cerumen.      Left Ear: Tympanic membrane, ear canal and external ear normal. There is no impacted cerumen.      Nose: Nose normal. No congestion or rhinorrhea.      Mouth/Throat:      Mouth: Mucous membranes are moist.      Pharynx: No oropharyngeal exudate or posterior oropharyngeal erythema.   Eyes:      General: No scleral icterus.        Right eye: No discharge.         Left eye: No discharge.   Neck:      Vascular: No carotid bruit.   Cardiovascular:      Rate and Rhythm: Normal rate and regular rhythm.      Pulses: Normal pulses.      Heart sounds: Normal heart sounds. No murmur heard.     No friction rub. No gallop.   Pulmonary:      Effort: Pulmonary effort is normal. No respiratory distress.      Breath sounds: Normal breath sounds. No stridor. No wheezing, rhonchi or rales.   Chest:      Chest wall: No tenderness. "   Musculoskeletal:         General: No swelling, tenderness, deformity or signs of injury. Normal range of motion.      Cervical back: Normal range of motion and neck supple. No rigidity. No muscular tenderness.      Right lower leg: No edema.      Left lower leg: No edema.   Lymphadenopathy:      Cervical: No cervical adenopathy.   Skin:     General: Skin is warm and dry.      Capillary Refill: Capillary refill takes less than 2 seconds.      Coloration: Skin is not jaundiced.      Findings: No bruising, erythema, lesion or rash.   Neurological:      Mental Status: She is alert and oriented to person, place, and time. Mental status is at baseline.      Cranial Nerves: No cranial nerve deficit.      Sensory: No sensory deficit.      Motor: No weakness.      Coordination: Coordination normal.      Gait: Gait normal.   Psychiatric:         Mood and Affect: Mood normal.         Behavior: Behavior normal.         Thought Content: Thought content normal.         Judgment: Judgment normal.

## 2024-11-21 ENCOUNTER — TELEPHONE (OUTPATIENT)
Age: 49
End: 2024-11-21

## 2024-11-21 NOTE — TELEPHONE ENCOUNTER
Auth needed for Dexlansoprazole 60mg. Patient is to take 1 capsule daily. Dispense qty 30 with 5 additional refills. Dx: K21.9.     Tried/failed: omeprazole, famotidine, ranitidine    CMM Key: [id number]  Prescribed by Dr Chamberlain

## 2024-11-22 NOTE — TELEPHONE ENCOUNTER
PA for Dexlansoprazole 60MG dr capsules SUBMITTED to [employer]    via    [x]CMM-KEY: (Key: [id number])  []Surescripts-Case ID #    []Availity-Auth ID #  NDC #    []Faxed to plan   []Other website    []Phone call Case ID #      [x]PA sent as URGENT    All office notes, labs and other pertaining documents and studies sent. Clinical questions answered. Awaiting determination from insurance company.     Turnaround time for your insurance to make a decision on your Prior Authorization can take 7-21 business days.

## 2025-01-27 NOTE — PROGRESS NOTES
Based on clinical documentation indicated in your record, it appears that the patient may have the following conditions not coded in 2021:    F32 1 Current moderate episode of major depressive disorder without prior episode      If this is correct, please document and assess at your next visit May 6th  [place] [address]  coding opportunities             Chart reviewed, (number of) suggestions sent to provider: 1           Patients insurance company: [employer] (Medicare Advantage and [employer])             [place] [address]  coding opportunities             Chart reviewed, (number of) suggestions sent to provider: 1     Problem listed updated   Provider Accepted, (number of) suggestions accepted: 1        Patients insurance company: [employer] (Medicare Advantage and [employer]) Outreach attempt was made to schedule a Medicare Wellness Visit. This was the first attempt. Contact was not made, no answer/busy. No phone call placed, since FU appointment was already scheduled and MWV will be addressed in office.

## 2025-02-10 DIAGNOSIS — F51.01 PRIMARY INSOMNIA: ICD-10-CM

## 2025-02-11 RX ORDER — ZOLPIDEM TARTRATE 10 MG/1
10 TABLET ORAL
Qty: 30 TABLET | Refills: 1 | Status: SHIPPED | OUTPATIENT
Start: 2025-02-11

## 2025-03-26 DIAGNOSIS — F33.9 DEPRESSION, RECURRENT (HCC): ICD-10-CM

## 2025-03-27 RX ORDER — DESVENLAFAXINE 100 MG/1
100 TABLET, EXTENDED RELEASE ORAL DAILY
Qty: 90 TABLET | Refills: 1 | Status: SHIPPED | OUTPATIENT
Start: 2025-03-27

## 2025-04-07 DIAGNOSIS — K21.00 GASTROESOPHAGEAL REFLUX DISEASE WITH ESOPHAGITIS WITHOUT HEMORRHAGE: ICD-10-CM

## 2025-04-08 ENCOUNTER — OFFICE VISIT (OUTPATIENT)
Age: 50
End: 2025-04-08
Payer: COMMERCIAL

## 2025-04-08 VITALS
DIASTOLIC BLOOD PRESSURE: 76 MMHG | SYSTOLIC BLOOD PRESSURE: 112 MMHG | TEMPERATURE: 98.5 F | HEIGHT: 68 IN | WEIGHT: 183 LBS | OXYGEN SATURATION: 99 % | BODY MASS INDEX: 27.74 KG/M2 | HEART RATE: 75 BPM

## 2025-04-08 DIAGNOSIS — F32.2 SEVERE MAJOR DEPRESSIVE DISORDER (HCC): ICD-10-CM

## 2025-04-08 DIAGNOSIS — F51.01 PRIMARY INSOMNIA: ICD-10-CM

## 2025-04-08 DIAGNOSIS — R60.0 LOWER EXTREMITY EDEMA: ICD-10-CM

## 2025-04-08 DIAGNOSIS — R41.840 LACK OF CONCENTRATION: ICD-10-CM

## 2025-04-08 DIAGNOSIS — F32.1 CURRENT MODERATE EPISODE OF MAJOR DEPRESSIVE DISORDER WITHOUT PRIOR EPISODE (HCC): Primary | ICD-10-CM

## 2025-04-08 PROCEDURE — 99214 OFFICE O/P EST MOD 30 MIN: CPT | Performed by: FAMILY MEDICINE

## 2025-04-08 RX ORDER — OMEPRAZOLE 40 MG/1
40 CAPSULE, DELAYED RELEASE ORAL 2 TIMES DAILY
Qty: 60 CAPSULE | Refills: 5 | Status: SHIPPED | OUTPATIENT
Start: 2025-04-08

## 2025-04-08 RX ORDER — ZOLPIDEM TARTRATE 10 MG/1
10 TABLET ORAL
Qty: 30 TABLET | Refills: 3 | Status: SHIPPED | OUTPATIENT
Start: 2025-04-08

## 2025-04-08 RX ORDER — CLONIDINE HYDROCHLORIDE 0.1 MG/1
0.1 TABLET ORAL
Qty: 30 TABLET | Refills: 5 | Status: SHIPPED | OUTPATIENT
Start: 2025-04-08

## 2025-04-08 NOTE — ASSESSMENT & PLAN NOTE
Patient will complete adult ADHD form.    Orders:    Lipid panel; Future    TSH, 3rd generation with Free T4 reflex; Future    Comprehensive metabolic panel; Future    CBC and differential; Future

## 2025-04-08 NOTE — PROGRESS NOTES
Name: Yasmine Franco      : 1975      MRN: [id number]  Encounter Provider: Caleb Chamberlain DO  Encounter Date: 2025   Encounter department: [facility]  :  Assessment & Plan  Current moderate episode of major depressive disorder without prior episode (HCC)  Depression Screening Follow-up Plan: Patient's depression screening was positive with a PHQ-9 score of 12. Patient assessed for underlying major depression. They have no active suicidal ideations. Brief counseling provided and recommend additional follow-up/re-evaluation next office visit.         Lack of concentration  Patient will complete adult ADHD form.    Orders:    Lipid panel; Future    TSH, 3rd generation with Free T4 reflex; Future    Comprehensive metabolic panel; Future    CBC and differential; Future    Primary insomnia    Orders:    Lipid panel; Future    TSH, 3rd generation with Free T4 reflex; Future    Comprehensive metabolic panel; Future    CBC and differential; Future    zolpidem (AMBIEN) 10 mg tablet; Take 1 tablet (10 mg total) by mouth daily at bedtime as needed for sleep    cloNIDine (CATAPRES) 0.1 mg tablet; Take 1 tablet (0.1 mg total) by mouth daily at bedtime    Lower extremity edema  Patient will laboratory studies done.  Patient use compression hose.    Orders:    Lipid panel; Future    TSH, 3rd generation with Free T4 reflex; Future    Comprehensive metabolic panel; Future    CBC and differential; Future    B-Type Natriuretic Peptide(BNP); Future    Severe major depressive disorder (HCC)  Depression Screening Follow-up Plan: Patient's depression screening was positive with a PHQ-9 score of 12. Patient assessed for underlying major depression. They have no active suicidal ideations. Brief counseling provided and recommend additional follow-up/re-evaluation next office visit.               Depression Screening and Follow-up Plan: Patient's depression screening was positive with a PHQ-9 score of 12.  "        History of Present Illness   Patient is here for follow-up on depression.  Patient is on Pristiq.  Patient doing fairly well in this regard.  No homicidal suicidal thoughts.  Patient with concentration issues.  Patient also with decreased sleep.  Patient does not feel rested when getting up.  Concern for possible ADHD.  No grandiosity.  Patient does get swelling bilateral lower extremities with some discomfort when sitting in a car for greater than 2 hours.  Urination is normal.  No chest pain or shortness of breath.    Depression  Pertinent negatives include no chest pain.     Review of Systems   Constitutional: Negative.    HENT: Negative.     Eyes: Negative.    Respiratory: Negative.  Negative for chest tightness and shortness of breath.    Cardiovascular:  Positive for leg swelling. Negative for chest pain.   Gastrointestinal: Negative.    Endocrine: Negative.    Genitourinary: Negative.    Musculoskeletal: Negative.    Skin: Negative.    Allergic/Immunologic: Negative.    Neurological: Negative.    Hematological: Negative.    Psychiatric/Behavioral:  Positive for decreased concentration, depression, dysphoric mood and sleep disturbance. Negative for agitation, self-injury and suicidal ideas. The patient is not nervous/anxious.        Objective   /76 (BP Location: Left arm, Patient Position: Sitting, Cuff Size: Standard)   Pulse 75   Temp 98.5 °F (36.9 °C) (Temporal)   Ht 5' 8\" (1.727 m)   Wt 83 kg (183 lb)   LMP 12/06/2016   SpO2 99%   BMI 27.83 kg/m²      Physical Exam    "

## 2025-04-08 NOTE — ASSESSMENT & PLAN NOTE
Depression Screening Follow-up Plan: Patient's depression screening was positive with a PHQ-9 score of 12. Patient assessed for underlying major depression. They have no active suicidal ideations. Brief counseling provided and recommend additional follow-up/re-evaluation next office visit.

## 2025-04-08 NOTE — ASSESSMENT & PLAN NOTE
Patient will laboratory studies done.  Patient use compression hose.    Orders:    Lipid panel; Future    TSH, 3rd generation with Free T4 reflex; Future    Comprehensive metabolic panel; Future    CBC and differential; Future    B-Type Natriuretic Peptide(BNP); Future

## 2025-04-08 NOTE — ASSESSMENT & PLAN NOTE
Depression Screening Follow-up Plan: Patient's depression screening was positive with a PHQ-9 score of 12. Patient assessed for underlying major depression. They have no active suicidal ideations. Brief counseling provided and recommend additional follow-up/re-evaluation next office visit.  Continue with Pristiq.

## 2025-04-08 NOTE — ASSESSMENT & PLAN NOTE
Orders:    Lipid panel; Future    TSH, 3rd generation with Free T4 reflex; Future    Comprehensive metabolic panel; Future    CBC and differential; Future    zolpidem (AMBIEN) 10 mg tablet; Take 1 tablet (10 mg total) by mouth daily at bedtime as needed for sleep    cloNIDine (CATAPRES) 0.1 mg tablet; Take 1 tablet (0.1 mg total) by mouth daily at bedtime

## 2025-05-01 DIAGNOSIS — F51.01 PRIMARY INSOMNIA: ICD-10-CM

## 2025-05-01 DIAGNOSIS — K21.00 GASTROESOPHAGEAL REFLUX DISEASE WITH ESOPHAGITIS WITHOUT HEMORRHAGE: ICD-10-CM

## 2025-05-01 RX ORDER — OMEPRAZOLE 40 MG/1
40 CAPSULE, DELAYED RELEASE ORAL 2 TIMES DAILY
Qty: 180 CAPSULE | Refills: 2 | OUTPATIENT
Start: 2025-05-01

## 2025-05-01 RX ORDER — CLONIDINE HYDROCHLORIDE 0.1 MG/1
0.1 TABLET ORAL
Qty: 90 TABLET | Refills: 2 | OUTPATIENT
Start: 2025-05-01

## 2025-05-21 ENCOUNTER — OFFICE VISIT (OUTPATIENT)
Age: 50
End: 2025-05-21

## 2025-05-21 VITALS — BODY MASS INDEX: 27.83 KG/M2 | WEIGHT: 183 LBS

## 2025-05-21 DIAGNOSIS — D22.9 MULTIPLE BENIGN MELANOCYTIC NEVI: Primary | ICD-10-CM

## 2025-05-21 DIAGNOSIS — L90.5 SCAR: ICD-10-CM

## 2025-05-21 DIAGNOSIS — L82.1 SEBORRHEIC KERATOSIS: ICD-10-CM

## 2025-05-21 DIAGNOSIS — D18.01 CHERRY ANGIOMA: ICD-10-CM

## 2025-05-21 DIAGNOSIS — L81.4 LENTIGO: ICD-10-CM

## 2025-05-21 DIAGNOSIS — D48.5 NEOPLASM OF UNCERTAIN BEHAVIOR OF SKIN: ICD-10-CM

## 2025-05-21 PROCEDURE — 88305 TISSUE EXAM BY PATHOLOGIST: CPT | Performed by: STUDENT IN AN ORGANIZED HEALTH CARE EDUCATION/TRAINING PROGRAM

## 2025-05-21 NOTE — PROGRESS NOTES
"[facility] Dermatology Clinic Note     Patient Name: Yasmine Franco  Encounter Date: 5/21/25       Have you been cared for by a [facility] Dermatologist in the last 3 years and, if so, which description applies to you? NO. I am considered a \"new\" patient and must complete all patient intake questions. I am of child-bearing potential.     REVIEW OF SYSTEMS:  Have you recently had or currently have any of the following? Recent fever or chills? No  Any non-healing wound? No  Are you pregnant or planning to become pregnant? No  Are you currently or planning to be nursing or breast feeding? No   PAST MEDICAL HISTORY:  Have you personally ever had or currently have any of the following?  If \"YES,\" then please provide more detail. Skin cancer (such as Melanoma, Basal Cell Carcinoma, Squamous Cell Carcinoma?  No  Tuberculosis, HIV/AIDS, Hepatitis B or C: No  Radiation Treatment No   HISTORY OF IMMUNOSUPPRESSION:   Do you have a history of any of the following:  Systemic Immunosuppression such as Diabetes, Biologic or Immunotherapy, Chemotherapy, Organ Transplantation, Bone Marrow Transplantation or Prednsione?  No    Answering \"YES\" requires the addition of the dotphrase \"IMMUNOSUPPRESSED\" as the first diagnosis of the patient's visit.   FAMILY HISTORY:  Any \"first degree relatives\" (parent, brother, sister, or child) with the following?    Skin Cancer, Pancreatic or Other Cancer? YES, Mother (SCC and BCC)   PATIENT EXPERIENCE:    Do you want the Dermatologist to perform a COMPLETE skin exam today including a clinical examination under the \"bra and underwear\" areas?  NO  If necessary, do we have your permission to call and leave a detailed message on your Preferred Phone number that includes your specific medical information?  Yes      Allergies[1] Current Medications[2]              Whom besides the patient is providing clinical information about today's encounter?   NO ADDITIONAL HISTORIAN (patient alone provided " "history)    Physical Exam and Assessment/Plan by Diagnosis:    NEOPLASM OF UNCERTAIN BEHAVIOR OF SKIN    Physical Exam:  (Anatomic Location); (Size and Morphological Description); (Differential Diagnosis):  Specimen A: Left posterior shoulder; 0.6 x 0.4 cm pink papule; DDX:  traumatized nevus; rule out atypia       Additional History of Present Condition:  Patient states lesion does get irritated and has changed in size.    Assessment and Plan:  I have discussed with the patient that a sample of skin via a \"skin biopsy” would be potentially helpful to further make a specific diagnosis under the microscope.  Based on a thorough discussion of this condition and the management approach to it (including a comprehensive discussion of the known risks, side effects and potential benefits of treatment), the patient (family) agrees to implement the following specific plan:    Procedure:  Skin Biopsy.  After a thorough discussion of treatment options and risk/benefits/alternatives (including but not limited to local pain, scarring, dyspigmentation, blistering, possible superinfection, and inability to confirm a diagnosis via histopathology), verbal and written consent were obtained and portion of the rash was biopsied for tissue sample.  See below for consent that was obtained from patient and subsequent Procedure Note.  Will call with results.   PROCEDURE TANGENTIAL (SHAVE) BIOPSY NOTE:    Performing Physician: Kay Mirza PA-C  Anatomic Location; Clinical Description with size (cm); Pre-Op Diagnosis:   Specimen A: Left posterior shoulder; 0.6 x 0.4 cm pink papule; DDX: traumatized nevus; rule out atypia   Post-op diagnosis: Same     Local anesthesia: 1% xylocaine with epi      Topical anesthesia: None    Hemostasis: Aluminum chloride       After obtaining informed consent  at which time there was a discussion about the purpose of biopsy  and low risks of infection and bleeding.  The area was prepped and draped in the " "usual fashion. Anesthesia was obtained with 1% lidocaine with epinephrine. A shave biopsy to an appropriate sampling depth was obtained by Shave (Dermablade or 15 blade) The resulting wound was covered with surgical ointment and bandaged appropriately.     The patient tolerated the procedure well without complications and was without signs of functional compromise.      Specimen has been sent for review by Dermatopathology.    Standard post-procedure care has been explained and has been included in written form within the patient's copy of Informed Consent.    INFORMED CONSENT DISCUSSION AND POST-OPERATIVE INSTRUCTIONS FOR PATIENT    I.  RATIONALE FOR PROCEDURE  I understand that a skin biopsy allows the Dermatologist to test a lesion or rash under the microscope to obtain a diagnosis.  It usually involves numbing the area with numbing medication and removing a small piece of skin; sometimes the area will be closed with sutures. In this specific procedure, sutures are not usually needed.  If any sutures are placed, then they are usually need to be removed in 2 weeks or less.    I understand that my Dermatologist recommends that a skin \"shave\" biopsy be performed today.  A local anesthetic, similar to the kind that a dentist uses when filling a cavity, will be injected with a very small needle into the skin area to be sampled.  The injected skin and tissue underneath \"will go to sleep” and become numb so no pain should be felt afterwards.  An instrument shaped like a tiny \"razor blade\" (shave biopsy instrument) will be used to cut a small piece of tissue and skin from the area so that a sample of tissue can be taken and examined more closely under the microscope.  A slight amount of bleeding will occur, but it will be stopped with direct pressure and a pressure bandage and any other appropriate methods.  I understands that a scar will form where the wound was created.  Surgical ointment will be applied to help " "protect the wound.  Sutures are not usually needed.    II.  RISKS AND POTENTIAL COMPLICATIONS   I understand the risks and potential complications of a skin biopsy include but are not limited to the following:  Bleeding  Infection  Pain  Scar/keloid  Skin discoloration  Incomplete Removal  Recurrence  Nerve Damage/Numbness/Loss of Function  Allergic Reaction to Anesthesia  Biopsies are diagnostic procedures and based on findings additional treatment or evaluation may be required  Loss or destruction of specimen resulting in no additional findings    My Dermatologist has explained to me the nature of the condition, the nature of the procedure, and the benefits to be reasonably expected compared with alternative approaches.  My Dermatologist has discussed the likelihood of major risks or complications of this procedure including the specific risks listed above, such as bleeding, infection, and scarring/keloid.  I understand that a scar is expected after this procedure.  I understand that my physician cannot predict if the scar will form a \"keloid,\" which extends beyond the borders of the wound that is created.  A keloid is a thick, painful, and bumpy scar.  A keloid can be difficult to treat, as it does not always respond well to therapy, which includes injecting cortisone directly into the keloid every few weeks.  While this usually reduces the pain and size of the scar, it does not eliminate it.      I understand that photographs may be taken before and after the procedure.  These will be maintained as part of the medical providers confidential records and may not be made available to me.  I further authorize the medical provider to use the photographs for teaching purposes or to illustrate scientific papers, books, or lectures if in his/her judgment, medical research, education, or science may benefit from its use.    I have had an opportunity to fully inquire about the risks and benefits of this procedure and its " "alternatives.   I have been given ample time and opportunity to ask questions and to seek a second opinion if I wished to do so.  I acknowledge that there have specifically been no guarantees as to the cosmetic results from the procedure.  I am aware that with any procedure there is always the possibility of an unexpected complication.    III. POST-PROCEDURAL CARE (WHAT YOU WILL NEED TO DO \"AFTER THE BIOPSY\" TO OPTIMIZE HEALING)    Keep the area clean and dry.  Try NOT to remove the bandage or get it wet for the first 24 hours.    Gently clean the area and apply surgical ointment (such as Vaseline petrolatum ointment, which is available \"over the counter\" and not a prescription) to the biopsy site for up to 2 weeks straight.  This acts to protect the wound from the outside world.      Sutures are not usually placed in this procedure.  If any sutures were placed, return for suture removal as instructed (generally 1 week for the face, 2 weeks for the body).      Take Acetaminophen (Tylenol) for discomfort, if no contraindications.  Ibuprofen or aspirin could make bleeding worse.    Call our office immediately for signs of infection: fever, chills, increased redness, warmth, tenderness, discomfort/pain, or pus or foul smell coming from the wound.    WHAT TO DO IF THERE IS ANY BLEEDING?  If a small amount of bleeding is noticed, place a clean cloth over the area and apply firm pressure for ten minutes.  Check the wound after 10 minutes of direct pressure.  If bleeding persists, try one more time for an additional 10 minutes of direct pressure on the area.  If the bleeding becomes heavier or does not stop after the second attempt, or if you have any other questions about this procedure, then please call your [facility] Dermatologist by calling [phone] (SKIN).     I hereby acknowledge that I have reviewed and verified the site with my Dermatologist and have requested and authorized my Dermatologist to proceed with " "the procedure.    HYPOPIGMENTED SCARS  Physical Exam:  Anatomic Location Affected:  forearms  Morphological Description:  hypopigmented linear scars     Additional History of Present Condition:  Patient states there has been scarring on her arm since the 80's. Patient has tried different oils and creams but nothing helps.     Assessment and Plan:  Based on a thorough discussion of this condition and the management approach to it (including a comprehensive discussion of the known risks, side effects and potential benefits of treatment), the patient (family) agrees to implement the following specific plan:  Appointment scheduled with Dr. Perry on 8/26/25 for further evaluation. Advised there may not be anything we can do to re-pigment the skin.       MELANOCYTIC NEVI (\"Moles\")    Physical Exam:  Anatomic Location Affected:   Mostly on sun-exposed areas of the trunk and extremities   Morphological Description:  Scattered, 1-4mm round to ovoid, symmetrical-appearing, even bordered, skin colored to dark brown macules/papules      Additional History of Present Condition:  Present on exam.   Assessment and Plan:  Based on a thorough discussion of this condition and the management approach to it (including a comprehensive discussion of the known risks, side effects and potential benefits of treatment), the patient (family) agrees to implement the following specific plan:  Reassured benign.   Monitor for changes. ABCDE's of melanoma handout provided.   Practice sun protection. Apply broad spectrum (UVA and UVB) sunscreen, SPF 30 or higher every 2 hours. Wear sun protective clothing, hats, and sunglasses.       LENTIGO    Physical Exam:  Anatomic Location Affected:  sun exposed areas of trunk and extremities   Morphological Description:  multiple scattered tan to brown evenly pigmented macules      Additional History of Present Condition:  Present on exam.     Assessment and Plan:  Based on a thorough discussion of this " "condition and the management approach to it (including a comprehensive discussion of the known risks, side effects and potential benefits of treatment), the patient (family) agrees to implement the following specific plan:  Reassured benign.   Practice sun protection. Apply broad spectrum (UVA and UVB) sunscreen, SPF 30 or higher every 2 hours. Wear sun protective clothing, hats, and sunglasses.       SEBORRHEIC KERATOSIS; NON-INFLAMED    Physical Exam:  Anatomic Location Affected:  trunk   Morphological Description:  Flat and raised, waxy, smooth to warty textured, yellow to brownish-grey to dark brown to blackish, discrete, \"stuck-on\" appearing papules.      Additional History of Present Condition:  Present on exam. Patient denies any itching.     Assessment and Plan:  Based on a thorough discussion of this condition and the management approach to it (including a comprehensive discussion of the known risks, side effects and potential benefits of treatment), the patient (family) agrees to implement the following specific plan:  Reassured benign.       ANGIOMA (\"CHERRY ANGIOMA\")     Physical Exam:  Anatomic Location: scattered across trunk and extremities   Morphologic Description: 1-2 mm firm red to maroon to purples to blue discrete papule, on dermoscopy lacunae  by white septae seen       Additional History of Present Condition:  Present on exam.     Plan:  Reassured benign.     Follow-up: 1 year for skin exam.   Scribe Attestation      I,:  Mary Ellen Duran MA am acting as a scribe while in the presence of the attending physician.:       I,:  Kay Mirza PA-C personally performed the services described in this documentation    as scribed in my presence.:                  [1]   Allergies  Allergen Reactions    Bee Pollen     Bee Venom     Cat Dander     Diclofenac      Action Taken: FLU LIKE SYMPTOMS ; BODY ACHES; Category: Allergy; Annotation - [id number]: FLU LIKE SYMPTOMS ; BODY ACHES    Dog " Epithelium     Dust Mite Extract     Grass Extracts  [Gramineae Pollens]     Other      Annotation - [id number]: Root Beer , Coconut    Pollen Extract    [2]   Current Outpatient Medications:     ALPRAZolam (XANAX) 0.5 mg tablet, Take 1 tablet (0.5 mg total) by mouth 2 (two) times a day as needed for anxiety, Disp: 60 tablet, Rfl: 5    cloNIDine (CATAPRES) 0.1 mg tablet, Take 1 tablet (0.1 mg total) by mouth daily at bedtime, Disp: 30 tablet, Rfl: 5    Cyanocobalamin (VITAMIN B-12 PO), 5,000 mg, Disp: , Rfl:     desvenlafaxine (PRISTIQ) 100 mg 24 hr tablet, TAKE 1 TABLET BY MOUTH EVERY DAY, Disp: 90 tablet, Rfl: 1    dexlansoprazole (DEXILANT) 60 MG capsule, Take 1 capsule (60 mg total) by mouth daily (Patient not taking: Reported on 4/8/2025), Disp: 30 capsule, Rfl: 5    MAGNESIUM MALATE PO, , Disp: , Rfl:     methocarbamol (ROBAXIN) 500 mg tablet, Take 1 tablet (500 mg total) by mouth 4 (four) times a day, Disp: 120 tablet, Rfl: 0    omeprazole (PriLOSEC) 40 MG capsule, Take 1 capsule (40 mg total) by mouth 2 (two) times a day, Disp: 60 capsule, Rfl: 5    patient supplied medication, Place on the skin as needed CBD Creme, Disp: , Rfl:     Probiotic Product (PROBIOTIC DAILY PO), , Disp: , Rfl:     Pyridoxine HCl (VITAMIN B-6 PO), 50 mg, Disp: , Rfl:     zolpidem (AMBIEN) 10 mg tablet, Take 1 tablet (10 mg total) by mouth daily at bedtime as needed for sleep, Disp: 30 tablet, Rfl: 1    zolpidem (AMBIEN) 10 mg tablet, Take 1 tablet (10 mg total) by mouth daily at bedtime as needed for sleep, Disp: 30 tablet, Rfl: 3

## 2025-05-21 NOTE — PATIENT INSTRUCTIONS
"INFORMED CONSENT DISCUSSION AND POST-OPERATIVE INSTRUCTIONS FOR PATIENT    I.  RATIONALE FOR PROCEDURE  I understand that a skin biopsy allows the Dermatologist to test a lesion or rash under the microscope to obtain a diagnosis.  It usually involves numbing the area with numbing medication and removing a small piece of skin; sometimes the area will be closed with sutures. In this specific procedure, sutures are not usually needed.  If any sutures are placed, then they are usually need to be removed in 2 weeks or less.    I understand that my Dermatologist recommends that a skin \"shave\" biopsy be performed today.  A local anesthetic, similar to the kind that a dentist uses when filling a cavity, will be injected with a very small needle into the skin area to be sampled.  The injected skin and tissue underneath \"will go to sleep” and become numb so no pain should be felt afterwards.  An instrument shaped like a tiny \"razor blade\" (shave biopsy instrument) will be used to cut a small piece of tissue and skin from the area so that a sample of tissue can be taken and examined more closely under the microscope.  A slight amount of bleeding will occur, but it will be stopped with direct pressure and a pressure bandage and any other appropriate methods.  I understands that a scar will form where the wound was created.  Surgical ointment will be applied to help protect the wound.  Sutures are not usually needed.    II.  RISKS AND POTENTIAL COMPLICATIONS   I understand the risks and potential complications of a skin biopsy include but are not limited to the following:  Bleeding  Infection  Pain  Scar/keloid  Skin discoloration  Incomplete Removal  Recurrence  Nerve Damage/Numbness/Loss of Function  Allergic Reaction to Anesthesia  Biopsies are diagnostic procedures and based on findings additional treatment or evaluation may be required  Loss or destruction of specimen resulting in no additional findings    My Dermatologist " "has explained to me the nature of the condition, the nature of the procedure, and the benefits to be reasonably expected compared with alternative approaches.  My Dermatologist has discussed the likelihood of major risks or complications of this procedure including the specific risks listed above, such as bleeding, infection, and scarring/keloid.  I understand that a scar is expected after this procedure.  I understand that my physician cannot predict if the scar will form a \"keloid,\" which extends beyond the borders of the wound that is created.  A keloid is a thick, painful, and bumpy scar.  A keloid can be difficult to treat, as it does not always respond well to therapy, which includes injecting cortisone directly into the keloid every few weeks.  While this usually reduces the pain and size of the scar, it does not eliminate it.      I understand that photographs may be taken before and after the procedure.  These will be maintained as part of the medical providers confidential records and may not be made available to me.  I further authorize the medical provider to use the photographs for teaching purposes or to illustrate scientific papers, books, or lectures if in his/her judgment, medical research, education, or science may benefit from its use.    I have had an opportunity to fully inquire about the risks and benefits of this procedure and its alternatives.   I have been given ample time and opportunity to ask questions and to seek a second opinion if I wished to do so.  I acknowledge that there have specifically been no guarantees as to the cosmetic results from the procedure.  I am aware that with any procedure there is always the possibility of an unexpected complication.    III. POST-PROCEDURAL CARE (WHAT YOU WILL NEED TO DO \"AFTER THE BIOPSY\" TO OPTIMIZE HEALING)    Keep the area clean and dry.  Try NOT to remove the bandage or get it wet for the first 24 hours.    Gently clean the area and apply " "surgical ointment (such as Vaseline petrolatum ointment, which is available \"over the counter\" and not a prescription) to the biopsy site for up to 2 weeks straight.  This acts to protect the wound from the outside world.      Sutures are not usually placed in this procedure.  If any sutures were placed, return for suture removal as instructed (generally 1 week for the face, 2 weeks for the body).      Take Acetaminophen (Tylenol) for discomfort, if no contraindications.  Ibuprofen or aspirin could make bleeding worse.    Call our office immediately for signs of infection: fever, chills, increased redness, warmth, tenderness, discomfort/pain, or pus or foul smell coming from the wound.    WHAT TO DO IF THERE IS ANY BLEEDING?  If a small amount of bleeding is noticed, place a clean cloth over the area and apply firm pressure for ten minutes.  Check the wound after 10 minutes of direct pressure.  If bleeding persists, try one more time for an additional 10 minutes of direct pressure on the area.  If the bleeding becomes heavier or does not stop after the second attempt, or if you have any other questions about this procedure, then please call your [facility]'s Dermatologist by calling [phone] (SKIN).     I hereby acknowledge that I have reviewed and verified the site with my Dermatologist and have requested and authorized my Dermatologist to proceed with the procedure.  "

## 2025-05-27 ENCOUNTER — RESULTS FOLLOW-UP (OUTPATIENT)
Age: 50
End: 2025-05-27

## 2025-05-27 PROCEDURE — 88305 TISSUE EXAM BY PATHOLOGIST: CPT | Performed by: STUDENT IN AN ORGANIZED HEALTH CARE EDUCATION/TRAINING PROGRAM

## 2025-05-29 DIAGNOSIS — F51.01 PRIMARY INSOMNIA: ICD-10-CM

## 2025-05-29 RX ORDER — CLONIDINE HYDROCHLORIDE 0.1 MG/1
0.1 TABLET ORAL
Qty: 90 TABLET | Refills: 2 | Status: SHIPPED | OUTPATIENT
Start: 2025-05-29

## 2025-06-20 ENCOUNTER — HOSPITAL ENCOUNTER (OUTPATIENT)
Dept: RADIOLOGY | Facility: IMAGING CENTER | Age: 50
End: 2025-06-20
Payer: COMMERCIAL

## 2025-06-20 VITALS — WEIGHT: 165 LBS | HEIGHT: 68 IN | BODY MASS INDEX: 25.01 KG/M2

## 2025-06-20 DIAGNOSIS — Z12.31 ENCOUNTER FOR SCREENING MAMMOGRAM FOR MALIGNANT NEOPLASM OF BREAST: ICD-10-CM

## 2025-06-20 PROCEDURE — 77067 SCR MAMMO BI INCL CAD: CPT

## 2025-06-20 PROCEDURE — 77063 BREAST TOMOSYNTHESIS BI: CPT

## 2025-06-26 DIAGNOSIS — F41.0 PANIC ATTACK: ICD-10-CM

## 2025-06-26 DIAGNOSIS — M54.12 CERVICAL RADICULITIS: ICD-10-CM

## 2025-06-26 DIAGNOSIS — F51.01 PRIMARY INSOMNIA: ICD-10-CM

## 2025-06-26 RX ORDER — ALPRAZOLAM 0.5 MG
0.5 TABLET ORAL 2 TIMES DAILY PRN
Qty: 60 TABLET | Refills: 0 | Status: SHIPPED | OUTPATIENT
Start: 2025-06-26

## 2025-06-26 RX ORDER — ZOLPIDEM TARTRATE 10 MG/1
10 TABLET ORAL
Qty: 30 TABLET | Refills: 0 | Status: SHIPPED | OUTPATIENT
Start: 2025-06-26

## 2025-06-26 RX ORDER — METHOCARBAMOL 500 MG/1
500 TABLET, FILM COATED ORAL 4 TIMES DAILY
Qty: 120 TABLET | Refills: 0 | Status: SHIPPED | OUTPATIENT
Start: 2025-06-26

## 2025-07-25 ENCOUNTER — ANNUAL EXAM (OUTPATIENT)
Age: 50
End: 2025-07-25
Payer: COMMERCIAL

## 2025-07-25 VITALS
HEIGHT: 67 IN | BODY MASS INDEX: 25.99 KG/M2 | SYSTOLIC BLOOD PRESSURE: 114 MMHG | WEIGHT: 165.6 LBS | DIASTOLIC BLOOD PRESSURE: 68 MMHG

## 2025-07-25 DIAGNOSIS — Z01.419 WOMEN'S ANNUAL ROUTINE GYNECOLOGICAL EXAMINATION: Primary | ICD-10-CM

## 2025-07-25 PROCEDURE — G0476 HPV COMBO ASSAY CA SCREEN: HCPCS | Performed by: OBSTETRICS & GYNECOLOGY

## 2025-07-25 PROCEDURE — G0145 SCR C/V CYTO,THINLAYER,RESCR: HCPCS | Performed by: OBSTETRICS & GYNECOLOGY

## 2025-07-25 PROCEDURE — S0610 ANNUAL GYNECOLOGICAL EXAMINA: HCPCS | Performed by: OBSTETRICS & GYNECOLOGY

## 2025-07-31 LAB
LAB AP GYN PRIMARY INTERPRETATION: NORMAL
Lab: NORMAL

## 2025-08-11 ENCOUNTER — OFFICE VISIT (OUTPATIENT)
Age: 50
End: 2025-08-11
Payer: COMMERCIAL

## 2025-08-11 PROBLEM — F90.0 ATTENTION DEFICIT HYPERACTIVITY DISORDER (ADHD), PREDOMINANTLY INATTENTIVE TYPE: Status: ACTIVE | Noted: 2025-08-11
